# Patient Record
Sex: MALE | Race: BLACK OR AFRICAN AMERICAN | NOT HISPANIC OR LATINO | Employment: FULL TIME | URBAN - METROPOLITAN AREA
[De-identification: names, ages, dates, MRNs, and addresses within clinical notes are randomized per-mention and may not be internally consistent; named-entity substitution may affect disease eponyms.]

---

## 2017-01-30 ENCOUNTER — ALLSCRIPTS OFFICE VISIT (OUTPATIENT)
Dept: OTHER | Facility: OTHER | Age: 52
End: 2017-01-30

## 2017-03-06 ENCOUNTER — ALLSCRIPTS OFFICE VISIT (OUTPATIENT)
Dept: OTHER | Facility: OTHER | Age: 52
End: 2017-03-06

## 2017-03-06 DIAGNOSIS — M25.50 PAIN IN JOINT: ICD-10-CM

## 2017-03-06 DIAGNOSIS — M79.10 MYALGIA: ICD-10-CM

## 2017-03-06 DIAGNOSIS — E78.5 HYPERLIPIDEMIA: ICD-10-CM

## 2017-03-06 DIAGNOSIS — I10 ESSENTIAL (PRIMARY) HYPERTENSION: ICD-10-CM

## 2017-04-05 ENCOUNTER — LAB CONVERSION - ENCOUNTER (OUTPATIENT)
Dept: OTHER | Facility: OTHER | Age: 52
End: 2017-04-05

## 2017-04-05 ENCOUNTER — GENERIC CONVERSION - ENCOUNTER (OUTPATIENT)
Dept: OTHER | Facility: OTHER | Age: 52
End: 2017-04-05

## 2017-04-05 LAB
25(OH)D3 SERPL-MCNC: 16 NG/ML (ref 30–100)
A/G RATIO (HISTORICAL): 1.4 (CALC) (ref 1–2.5)
ALBUMIN SERPL BCP-MCNC: 4.2 G/DL (ref 3.6–5.1)
ALP SERPL-CCNC: 68 U/L (ref 40–115)
ALT SERPL W P-5'-P-CCNC: 23 U/L (ref 9–46)
AST SERPL W P-5'-P-CCNC: 24 U/L (ref 10–35)
BILIRUB SERPL-MCNC: 0.7 MG/DL (ref 0.2–1.2)
BUN SERPL-MCNC: 14 MG/DL (ref 7–25)
BUN/CREA RATIO (HISTORICAL): 10 (CALC) (ref 6–22)
CALCIUM SERPL-MCNC: 9.9 MG/DL (ref 8.6–10.3)
CHLORIDE SERPL-SCNC: 102 MMOL/L (ref 98–110)
CHOLEST SERPL-MCNC: 215 MG/DL (ref 125–200)
CHOLEST/HDLC SERPL: 3.5 (CALC)
CO2 SERPL-SCNC: 27 MMOL/L (ref 20–31)
CREAT SERPL-MCNC: 1.37 MG/DL (ref 0.7–1.33)
EGFR AFRICAN AMERICAN (HISTORICAL): 68 ML/MIN/1.73M2
EGFR-AMERICAN CALC (HISTORICAL): 59 ML/MIN/1.73M2
GAMMA GLOBULIN (HISTORICAL): 3 G/DL (CALC) (ref 1.9–3.7)
GLUCOSE (HISTORICAL): 99 MG/DL (ref 65–99)
HDLC SERPL-MCNC: 61 MG/DL
LDL CHOLESTEROL (HISTORICAL): 133 MG/DL (CALC)
NON-HDL-CHOL (CHOL-HDL) (HISTORICAL): 154 MG/DL (CALC)
POTASSIUM SERPL-SCNC: 4.8 MMOL/L (ref 3.5–5.3)
SODIUM SERPL-SCNC: 141 MMOL/L (ref 135–146)
T4 FREE SERPL-MCNC: 1.2 NG/DL (ref 0.8–1.8)
TOTAL PROTEIN (HISTORICAL): 7.2 G/DL (ref 6.1–8.1)
TRIGL SERPL-MCNC: 106 MG/DL
TSH SERPL DL<=0.05 MIU/L-ACNC: 1.55 MIU/L (ref 0.4–4.5)

## 2017-04-07 ENCOUNTER — ALLSCRIPTS OFFICE VISIT (OUTPATIENT)
Dept: OTHER | Facility: OTHER | Age: 52
End: 2017-04-07

## 2017-04-13 ENCOUNTER — ALLSCRIPTS OFFICE VISIT (OUTPATIENT)
Dept: OTHER | Facility: OTHER | Age: 52
End: 2017-04-13

## 2017-04-24 ENCOUNTER — HOSPITAL ENCOUNTER (EMERGENCY)
Facility: HOSPITAL | Age: 52
Discharge: HOME | End: 2017-04-24
Attending: EMERGENCY MEDICINE
Payer: COMMERCIAL

## 2017-04-24 VITALS
RESPIRATION RATE: 18 BRPM | SYSTOLIC BLOOD PRESSURE: 164 MMHG | HEIGHT: 68 IN | OXYGEN SATURATION: 95 % | TEMPERATURE: 98.4 F | BODY MASS INDEX: 40.36 KG/M2 | HEART RATE: 78 BPM | WEIGHT: 266.32 LBS | DIASTOLIC BLOOD PRESSURE: 102 MMHG

## 2017-04-24 DIAGNOSIS — K12.2 UVULITIS: Primary | ICD-10-CM

## 2017-04-24 PROCEDURE — 96372 THER/PROPH/DIAG INJ SC/IM: CPT

## 2017-04-24 PROCEDURE — 99282 EMERGENCY DEPT VISIT SF MDM: CPT

## 2017-04-24 RX ORDER — KETOROLAC TROMETHAMINE 30 MG/ML
INJECTION, SOLUTION INTRAMUSCULAR; INTRAVENOUS
Status: DISCONTINUED
Start: 2017-04-24 | End: 2017-04-24 | Stop reason: HOSPADM

## 2017-04-24 RX ORDER — PREDNISONE 10 MG/1
TABLET ORAL
Qty: 14 TABLET | Refills: 0 | Status: SHIPPED | OUTPATIENT
Start: 2017-04-24 | End: 2017-05-31 | Stop reason: ALTCHOICE

## 2017-04-24 RX ORDER — LISINOPRIL 5 MG/1
5 TABLET ORAL DAILY
COMMUNITY
End: 2017-05-31 | Stop reason: ALTCHOICE

## 2017-04-24 RX ORDER — PREDNISONE 20 MG/1
60 TABLET ORAL ONCE
Status: COMPLETED | OUTPATIENT
Start: 2017-04-24 | End: 2017-04-24

## 2017-04-24 RX ORDER — KETOROLAC TROMETHAMINE 30 MG/ML
60 INJECTION, SOLUTION INTRAMUSCULAR; INTRAVENOUS ONCE
Status: COMPLETED | OUTPATIENT
Start: 2017-04-24 | End: 2017-04-24

## 2017-04-24 RX ORDER — AMLODIPINE BESYLATE 10 MG/1
10 TABLET ORAL DAILY
COMMUNITY
End: 2018-04-13 | Stop reason: SDUPTHER

## 2017-04-24 RX ADMIN — KETOROLAC TROMETHAMINE 60 MG: 30 INJECTION, SOLUTION INTRAMUSCULAR at 06:47

## 2017-04-24 RX ADMIN — PREDNISONE 60 MG: 20 TABLET ORAL at 06:47

## 2017-04-27 ENCOUNTER — ALLSCRIPTS OFFICE VISIT (OUTPATIENT)
Dept: OTHER | Facility: OTHER | Age: 52
End: 2017-04-27

## 2017-05-31 ENCOUNTER — APPOINTMENT (EMERGENCY)
Dept: RADIOLOGY | Facility: HOSPITAL | Age: 52
End: 2017-05-31
Payer: COMMERCIAL

## 2017-05-31 ENCOUNTER — HOSPITAL ENCOUNTER (EMERGENCY)
Facility: HOSPITAL | Age: 52
Discharge: HOME | End: 2017-05-31
Attending: EMERGENCY MEDICINE
Payer: COMMERCIAL

## 2017-05-31 VITALS
WEIGHT: 274.47 LBS | DIASTOLIC BLOOD PRESSURE: 89 MMHG | TEMPERATURE: 98.5 F | RESPIRATION RATE: 18 BRPM | OXYGEN SATURATION: 93 % | BODY MASS INDEX: 41.73 KG/M2 | HEART RATE: 99 BPM | SYSTOLIC BLOOD PRESSURE: 143 MMHG

## 2017-05-31 DIAGNOSIS — R05.9 COUGH: Primary | ICD-10-CM

## 2017-05-31 PROCEDURE — 71020 HB CHEST X-RAY 2VW FRONTAL&LATL: CPT

## 2017-05-31 PROCEDURE — 94640 AIRWAY INHALATION TREATMENT: CPT

## 2017-05-31 PROCEDURE — 99283 EMERGENCY DEPT VISIT LOW MDM: CPT

## 2017-05-31 RX ORDER — SODIUM CHLORIDE FOR INHALATION 0.9 %
VIAL, NEBULIZER (ML) INHALATION
Status: COMPLETED
Start: 2017-05-31 | End: 2017-05-31

## 2017-05-31 RX ORDER — ALBUTEROL SULFATE 90 UG/1
2 AEROSOL, METERED RESPIRATORY (INHALATION) ONCE
Status: COMPLETED | OUTPATIENT
Start: 2017-05-31 | End: 2017-05-31

## 2017-05-31 RX ORDER — LOSARTAN POTASSIUM 25 MG/1
5 TABLET ORAL DAILY
COMMUNITY
End: 2018-04-29 | Stop reason: DRUGHIGH

## 2017-05-31 RX ORDER — BENZONATATE 200 MG/1
200 CAPSULE ORAL 3 TIMES DAILY PRN
Qty: 21 CAPSULE | Refills: 0 | Status: SHIPPED | OUTPATIENT
Start: 2017-05-31 | End: 2017-06-07

## 2017-05-31 RX ORDER — BENZONATATE 100 MG/1
100 CAPSULE ORAL ONCE
Status: COMPLETED | OUTPATIENT
Start: 2017-05-31 | End: 2017-05-31

## 2017-05-31 RX ORDER — ALBUTEROL SULFATE 2.5 MG/3ML
5 SOLUTION RESPIRATORY (INHALATION) ONCE
Status: COMPLETED | OUTPATIENT
Start: 2017-05-31 | End: 2017-05-31

## 2017-05-31 RX ADMIN — ALBUTEROL SULFATE 5 MG: 2.5 SOLUTION RESPIRATORY (INHALATION) at 20:35

## 2017-05-31 RX ADMIN — ISODIUM CHLORIDE 3 ML: 0.03 SOLUTION RESPIRATORY (INHALATION) at 20:35

## 2017-05-31 RX ADMIN — BENZONATATE 100 MG: 100 CAPSULE ORAL at 20:34

## 2017-05-31 RX ADMIN — ALBUTEROL SULFATE 2 PUFF: 90 AEROSOL, METERED RESPIRATORY (INHALATION) at 21:38

## 2017-08-07 DIAGNOSIS — E55.9 VITAMIN D DEFICIENCY: ICD-10-CM

## 2017-08-07 DIAGNOSIS — I10 ESSENTIAL (PRIMARY) HYPERTENSION: ICD-10-CM

## 2017-08-13 ENCOUNTER — HOSPITAL ENCOUNTER (EMERGENCY)
Facility: HOSPITAL | Age: 52
Discharge: HOME | End: 2017-08-13
Attending: EMERGENCY MEDICINE
Payer: COMMERCIAL

## 2017-08-13 ENCOUNTER — APPOINTMENT (EMERGENCY)
Dept: RADIOLOGY | Facility: HOSPITAL | Age: 52
End: 2017-08-13
Payer: COMMERCIAL

## 2017-08-13 VITALS
SYSTOLIC BLOOD PRESSURE: 176 MMHG | HEIGHT: 68 IN | RESPIRATION RATE: 18 BRPM | WEIGHT: 283.51 LBS | OXYGEN SATURATION: 97 % | HEART RATE: 81 BPM | DIASTOLIC BLOOD PRESSURE: 107 MMHG | TEMPERATURE: 98.6 F | BODY MASS INDEX: 42.97 KG/M2

## 2017-08-13 DIAGNOSIS — M79.641 RIGHT HAND PAIN: Primary | ICD-10-CM

## 2017-08-13 PROCEDURE — 99283 EMERGENCY DEPT VISIT LOW MDM: CPT

## 2017-08-13 PROCEDURE — 73130 X-RAY EXAM OF HAND: CPT

## 2017-08-13 RX ORDER — NAPROXEN 500 MG/1
500 TABLET ORAL 2 TIMES DAILY WITH MEALS
Qty: 20 TABLET | Refills: 0 | Status: SHIPPED | OUTPATIENT
Start: 2017-08-13 | End: 2017-08-27

## 2017-08-13 RX ORDER — NAPROXEN 250 MG/1
500 TABLET ORAL ONCE
Status: COMPLETED | OUTPATIENT
Start: 2017-08-13 | End: 2017-08-13

## 2017-08-13 RX ADMIN — NAPROXEN 500 MG: 250 TABLET ORAL at 21:49

## 2017-08-27 ENCOUNTER — APPOINTMENT (EMERGENCY)
Dept: RADIOLOGY | Facility: HOSPITAL | Age: 52
End: 2017-08-27
Payer: COMMERCIAL

## 2017-08-27 ENCOUNTER — HOSPITAL ENCOUNTER (EMERGENCY)
Facility: HOSPITAL | Age: 52
Discharge: HOME | End: 2017-08-27
Attending: EMERGENCY MEDICINE | Admitting: EMERGENCY MEDICINE
Payer: COMMERCIAL

## 2017-08-27 VITALS
SYSTOLIC BLOOD PRESSURE: 160 MMHG | DIASTOLIC BLOOD PRESSURE: 103 MMHG | OXYGEN SATURATION: 96 % | RESPIRATION RATE: 18 BRPM | HEART RATE: 77 BPM | TEMPERATURE: 98.2 F

## 2017-08-27 DIAGNOSIS — M75.20 BICEPS TENDINITIS: Primary | ICD-10-CM

## 2017-08-27 PROCEDURE — 99283 EMERGENCY DEPT VISIT LOW MDM: CPT

## 2017-08-27 PROCEDURE — 73030 X-RAY EXAM OF SHOULDER: CPT

## 2017-08-27 RX ORDER — IBUPROFEN 600 MG/1
600 TABLET ORAL ONCE
Status: COMPLETED | OUTPATIENT
Start: 2017-08-27 | End: 2017-08-27

## 2017-08-27 RX ORDER — IBUPROFEN 600 MG/1
600 TABLET ORAL EVERY 6 HOURS PRN
Qty: 20 TABLET | Refills: 0 | Status: SHIPPED | OUTPATIENT
Start: 2017-08-27 | End: 2018-06-13 | Stop reason: ALTCHOICE

## 2017-08-27 RX ADMIN — IBUPROFEN 600 MG: 600 TABLET ORAL at 21:34

## 2017-12-15 ENCOUNTER — APPOINTMENT (EMERGENCY)
Dept: RADIOLOGY | Facility: HOSPITAL | Age: 52
End: 2017-12-15
Payer: COMMERCIAL

## 2017-12-15 ENCOUNTER — HOSPITAL ENCOUNTER (EMERGENCY)
Facility: HOSPITAL | Age: 52
Discharge: HOME | End: 2017-12-15
Attending: EMERGENCY MEDICINE | Admitting: EMERGENCY MEDICINE
Payer: COMMERCIAL

## 2017-12-15 VITALS
DIASTOLIC BLOOD PRESSURE: 113 MMHG | TEMPERATURE: 97.6 F | RESPIRATION RATE: 20 BRPM | OXYGEN SATURATION: 96 % | HEART RATE: 78 BPM | WEIGHT: 268.3 LBS | BODY MASS INDEX: 40.79 KG/M2 | SYSTOLIC BLOOD PRESSURE: 207 MMHG

## 2017-12-15 DIAGNOSIS — M75.22 BICIPITAL TENDONITIS OF LEFT SHOULDER: Primary | ICD-10-CM

## 2017-12-15 PROCEDURE — 99283 EMERGENCY DEPT VISIT LOW MDM: CPT

## 2017-12-15 PROCEDURE — 73030 X-RAY EXAM OF SHOULDER: CPT

## 2017-12-15 RX ORDER — METHOCARBAMOL 500 MG/1
1000 TABLET, FILM COATED ORAL 2 TIMES DAILY
Qty: 30 TABLET | Refills: 0 | Status: SHIPPED | OUTPATIENT
Start: 2017-12-15 | End: 2018-06-13 | Stop reason: ALTCHOICE

## 2017-12-15 RX ORDER — PREDNISONE 20 MG/1
60 TABLET ORAL ONCE
Status: COMPLETED | OUTPATIENT
Start: 2017-12-15 | End: 2017-12-15

## 2017-12-15 RX ORDER — METHOCARBAMOL 500 MG/1
1000 TABLET, FILM COATED ORAL ONCE
Status: COMPLETED | OUTPATIENT
Start: 2017-12-15 | End: 2017-12-15

## 2017-12-15 RX ORDER — PREDNISONE 20 MG/1
60 TABLET ORAL DAILY
Qty: 15 TABLET | Refills: 0 | Status: SHIPPED | OUTPATIENT
Start: 2017-12-15 | End: 2017-12-20

## 2017-12-15 RX ADMIN — PREDNISONE 60 MG: 20 TABLET ORAL at 21:42

## 2017-12-15 RX ADMIN — METHOCARBAMOL 1000 MG: 500 TABLET ORAL at 21:42

## 2017-12-16 NOTE — DISCHARGE INSTRUCTIONS
Tendinitis   WHAT YOU NEED TO KNOW:   Tendinitis is painful inflammation or breakdown of your tendons  It may also be called tendinopathy  Tendinitis often occurs in the knee, shoulder, ankle, hip, or elbow  DISCHARGE INSTRUCTIONS:   Medicines:   · Pain medicines  such as acetaminophen or NSAIDs may decrease swelling and pain or fever  These medicines are available without a doctor's order  Ask which medicine to take  Ask how much to take and when to take it  Follow directions  Acetaminophen and NSAIDs can cause liver or kidney damage if not taken correctly  If you take blood thinner medicine, always ask your healthcare provider if NSAIDs are safe for you  Always read the medicine label and follow the directions on it before using these medicine  · Take your medicine as directed  Contact your healthcare provider if you think your medicine is not helping or if you have side effects  Tell him if you are allergic to any medicine  Keep a list of the medicines, vitamins, and herbs you take  Include the amounts, and when and why you take them  Bring the list or the pill bottles to follow-up visits  Carry your medicine list with you in case of an emergency  Management:   · Rest  your tendon as directed to help it heal  Ask your healthcare provider if you need to stop putting weight on your affected area  · Ice  helps decrease swelling and pain  Ice may also help prevent tissue damage  Use an ice pack, or put crushed ice in a plastic bag  Cover it with a towel and place it on the affected area for 10 to 15 minutes every hour or as directed  · Support devices  such as a cane, splint, shoe insert, or brace may help reduce your pain  · Physical therapy  may be ordered by your healthcare provider  This may be used to teach you exercises to help improve movement and strength, and to decrease pain  You may also learn how to improve your posture, and how to lift or exercise correctly    Prevention:   · Stretch and warm up  before you exercise  · Exercise regularly  to strengthen the muscles around your joint  Ease into an exercise routine for the first 3 weeks to prevent another injury  Ask your healthcare provider about the best exercise plan for you  Rest fully between activities  · Use the right equipment  for sports and exercise  Wear braces or tape around weak joints as directed  Follow up with your healthcare provider as directed:  Write down your questions so you remember to ask them during your visits  Contact your healthcare provider if:   · You have increased pain even after you take medicine  · You have questions or concerns about your condition or care  Return to the emergency department if:   · You have increased redness over the joint, or swelling in the joint  · You suddenly cannot move your joint  © 2017 [address] [employer]  Information is for End User's use only and may not be sold, redistributed or otherwise used for commercial purposes  All illustrations and images included in CareNotes® are the copyrighted property of A D A M , Inc  or Nixon Castro  The above information is an  only  It is not intended as medical advice for individual conditions or treatments  Talk to your doctor, nurse or pharmacist before following any medical regimen to see if it is safe and effective for you

## 2017-12-16 NOTE — ED PROVIDER NOTES
History  Chief Complaint   Patient presents with    Shoulder Pain     Pt presents to ER with c/o's (L) shoulder pain that last night, pt states he can hardly move his arm  Pt denies trauma  Healthy appearing adult presents in no distress  71-year-old male patient presents to the emergency department for evaluation approximately 24 hours of left shoulder pain  The patient does have a history of bicipital tendinitis  The patient has similar symptoms again  The patient has point tenderness over the bicipital tendon  The patient on x-ray done to assure that there is no acute fracture will be treated for tendinitis  History provided by:  Patient   used: No    Shoulder Pain   Location:  Shoulder  Shoulder location:  L shoulder  Injury: no    Pain details:     Quality:  Sharp    Radiates to:  Does not radiate    Severity:  Mild    Onset quality:  Gradual    Timing:  Constant    Progression:  Unchanged  Handedness:  Right-handed  Dislocation: no    Foreign body present:  No foreign bodies  Tetanus status:  Up to date  Prior injury to area:  Yes  Relieved by:  Nothing  Worsened by:  Nothing  Ineffective treatments:  None tried  Associated symptoms: no fever, no muscle weakness, no neck pain, no numbness and no stiffness    Risk factors: no concern for non-accidental trauma        Prior to Admission Medications   Prescriptions Last Dose Informant Patient Reported? Taking? amLODIPine (NORVASC) 10 mg tablet   Yes No   Sig: Take 10 mg by mouth daily   ibuprofen (MOTRIN) 600 mg tablet   No No   Sig: Take 1 tablet by mouth every 6 (six) hours as needed for mild pain for up to 20 doses   losartan (COZAAR) 25 mg tablet   Yes No   Sig: Take 5 mg by mouth daily      Facility-Administered Medications: None       Past Medical History:   Diagnosis Date    Hypertension        History reviewed  No pertinent surgical history  History reviewed  No pertinent family history    I have reviewed and agree with the history as documented  Social History   Substance Use Topics    Smoking status: Never Smoker    Smokeless tobacco: Not on file    Alcohol use Yes        Review of Systems   Constitutional: Negative for fever  Musculoskeletal: Negative for neck pain and stiffness  All other systems reviewed and are negative  Physical Exam  ED Triage Vitals [12/15/17 1959]   Temperature Pulse Respirations Blood Pressure SpO2   97 6 °F (36 4 °C) 78 20 (!) 207/113 96 %      Temp Source Heart Rate Source Patient Position - Orthostatic VS BP Location FiO2 (%)   Oral Monitor Lying Right arm --      Pain Score       --           Orthostatic Vital Signs  Vitals:    12/15/17 1959   BP: (!) 207/113   Pulse: 78   Patient Position - Orthostatic VS: Lying       Physical Exam   Constitutional: He is oriented to person, place, and time  He appears well-developed and well-nourished  No distress  HENT:   Head: Normocephalic and atraumatic  Right Ear: External ear normal    Left Ear: External ear normal    Eyes: Conjunctivae and EOM are normal  Right eye exhibits no discharge  Left eye exhibits no discharge  No scleral icterus  Neck: Normal range of motion  Neck supple  No JVD present  No tracheal deviation present  No thyromegaly present  Cardiovascular: Normal rate and regular rhythm  Pulmonary/Chest: Effort normal and breath sounds normal  No stridor  No respiratory distress  He has no wheezes  He has no rales  Abdominal: Soft  Bowel sounds are normal  He exhibits no distension  There is no tenderness  Musculoskeletal: Normal range of motion  He exhibits no edema, tenderness or deformity  Arms:  Neurological: He is alert and oriented to person, place, and time  No cranial nerve deficit  Coordination normal    Skin: Skin is warm and dry  He is not diaphoretic  Psychiatric: He has a normal mood and affect  His behavior is normal    Nursing note and vitals reviewed        ED Medications  Medications predniSONE tablet 60 mg (60 mg Oral Given 12/15/17 2142)   methocarbamol (ROBAXIN) tablet 1,000 mg (1,000 mg Oral Given 12/15/17 2142)       Diagnostic Studies  Results Reviewed     None                 XR shoulder 2+ views LEFT   Final Result by Hasmukh Crawford MD (12/15 2035)      No evidence of acute left shoulder fracture, osseous lesion or soft tissue mass  Workstation performed: [id number]                    Procedures  Procedures       Phone Contacts  ED Phone Contact    ED Course  ED Course                                MDM  Number of Diagnoses or Management Options  Bicipital tendonitis of left shoulder: new and requires workup     Amount and/or Complexity of Data Reviewed  Tests in the radiology section of CPT®: ordered and reviewed  Decide to obtain previous medical records or to obtain history from someone other than the patient: yes  Review and summarize past medical records: yes    Patient Progress  Patient progress: stable    CritCare Time    Disposition  Final diagnoses:   Bicipital tendonitis of left shoulder     Time reflects when diagnosis was documented in both MDM as applicable and the Disposition within this note     Time User Action Codes Description Comment    12/15/2017  9:35 PM Mauricio Rankin Add [M75 22] Bicipital tendonitis of left shoulder       ED Disposition     ED Disposition Condition Comment    Discharge  Vandana Villarreal discharge to home/self care      Condition at discharge: Good        Follow-up Information     Follow up With Specialties Details Why Contact Info    Namita Saha MD Internal Medicine   [address] [address]  [phone]          Discharge Medication List as of 12/15/2017  9:36 PM      START taking these medications    Details   methocarbamol (ROBAXIN) 500 mg tablet Take 2 tablets by mouth 2 (two) times a day, Starting Fri 12/15/2017, Print      predniSONE 20 mg tablet Take 3 tablets by mouth daily for 5 days, Starting Fri 12/15/2017, Until Wed 12/20/2017, Print         CONTINUE these medications which have NOT CHANGED    Details   amLODIPine (NORVASC) 10 mg tablet Take 10 mg by mouth daily, Until Discontinued, Historical Med      ibuprofen (MOTRIN) 600 mg tablet Take 1 tablet by mouth every 6 (six) hours as needed for mild pain for up to 20 doses, Starting Sun 8/27/2017, Print      losartan (COZAAR) 25 mg tablet Take 5 mg by mouth daily, Until Discontinued, Historical Med           No discharge procedures on file      ED Provider  Electronically Signed by           Rios Soto DO  12/17/17 1521

## 2017-12-20 ENCOUNTER — GENERIC CONVERSION - ENCOUNTER (OUTPATIENT)
Dept: OTHER | Facility: OTHER | Age: 52
End: 2017-12-20

## 2017-12-20 ENCOUNTER — ALLSCRIPTS OFFICE VISIT (OUTPATIENT)
Dept: OTHER | Facility: OTHER | Age: 52
End: 2017-12-20

## 2017-12-20 DIAGNOSIS — I10 ESSENTIAL (PRIMARY) HYPERTENSION: ICD-10-CM

## 2017-12-20 DIAGNOSIS — Z12.5 ENCOUNTER FOR SCREENING FOR MALIGNANT NEOPLASM OF PROSTATE: ICD-10-CM

## 2017-12-20 DIAGNOSIS — R73.9 HYPERGLYCEMIA: ICD-10-CM

## 2017-12-20 DIAGNOSIS — E55.9 VITAMIN D DEFICIENCY: ICD-10-CM

## 2017-12-30 ENCOUNTER — LAB CONVERSION - ENCOUNTER (OUTPATIENT)
Dept: OTHER | Facility: OTHER | Age: 52
End: 2017-12-30

## 2017-12-30 LAB
25(OH)D3 SERPL-MCNC: 27 NG/ML (ref 30–100)
A/G RATIO (HISTORICAL): 1.4 (CALC) (ref 1–2.5)
ALBUMIN SERPL BCP-MCNC: 4 G/DL (ref 3.6–5.1)
ALP SERPL-CCNC: 68 U/L (ref 40–115)
ALT SERPL W P-5'-P-CCNC: 14 U/L (ref 9–46)
AST SERPL W P-5'-P-CCNC: 20 U/L (ref 10–35)
BASOPHILS # BLD AUTO: 0.8 %
BASOPHILS # BLD AUTO: 47 CELLS/UL (ref 0–200)
BILIRUB SERPL-MCNC: 0.8 MG/DL (ref 0.2–1.2)
BUN SERPL-MCNC: 10 MG/DL (ref 7–25)
BUN/CREA RATIO (HISTORICAL): ABNORMAL (CALC) (ref 6–22)
CALCIUM SERPL-MCNC: 9.1 MG/DL (ref 8.6–10.3)
CHLORIDE SERPL-SCNC: 100 MMOL/L (ref 98–110)
CHOLEST SERPL-MCNC: 242 MG/DL
CHOLEST/HDLC SERPL: 3.4 (CALC)
CO2 SERPL-SCNC: 31 MMOL/L (ref 20–31)
CREAT SERPL-MCNC: 1 MG/DL (ref 0.7–1.33)
DEPRECATED RDW RBC AUTO: 13.1 % (ref 11–15)
EGFR AFRICAN AMERICAN (HISTORICAL): 100 ML/MIN/1.73M2
EGFR-AMERICAN CALC (HISTORICAL): 86 ML/MIN/1.73M2
EOSINOPHIL # BLD AUTO: 201 CELLS/UL (ref 15–500)
EOSINOPHIL # BLD AUTO: 3.4 %
GAMMA GLOBULIN (HISTORICAL): 2.9 G/DL (CALC) (ref 1.9–3.7)
GLUCOSE (HISTORICAL): 122 MG/DL (ref 65–99)
HCT VFR BLD AUTO: 43.9 % (ref 38.5–50)
HDLC SERPL-MCNC: 72 MG/DL
HGB BLD-MCNC: 15 G/DL (ref 13.2–17.1)
LDL CHOLESTEROL (HISTORICAL): 130 MG/DL (CALC)
LYMPHOCYTES # BLD AUTO: 12.8 %
LYMPHOCYTES # BLD AUTO: 755 CELLS/UL (ref 850–3900)
MCH RBC QN AUTO: 30.7 PG (ref 27–33)
MCHC RBC AUTO-ENTMCNC: 34.2 G/DL (ref 32–36)
MCV RBC AUTO: 90 FL (ref 80–100)
MONOCYTES # BLD AUTO: 620 CELLS/UL (ref 200–950)
MONOCYTES (HISTORICAL): 10.5 %
NEUTROPHILS # BLD AUTO: 4278 CELLS/UL (ref 1500–7800)
NEUTROPHILS # BLD AUTO: 72.5 %
NON-HDL-CHOL (CHOL-HDL) (HISTORICAL): 170 MG/DL (CALC)
PLATELET # BLD AUTO: 324 THOUSAND/UL (ref 140–400)
PMV BLD AUTO: 9.8 FL (ref 7.5–12.5)
POTASSIUM SERPL-SCNC: 4.3 MMOL/L (ref 3.5–5.3)
PROSTATE SPECIFIC ANTIGEN TOTAL (HISTORICAL): 1 NG/ML
RBC # BLD AUTO: 4.88 MILLION/UL (ref 4.2–5.8)
SODIUM SERPL-SCNC: 139 MMOL/L (ref 135–146)
TOTAL PROTEIN (HISTORICAL): 6.9 G/DL (ref 6.1–8.1)
TRIGL SERPL-MCNC: 263 MG/DL
WBC # BLD AUTO: 5.9 THOUSAND/UL (ref 3.8–10.8)

## 2018-01-02 ENCOUNTER — GENERIC CONVERSION - ENCOUNTER (OUTPATIENT)
Dept: OTHER | Facility: OTHER | Age: 53
End: 2018-01-02

## 2018-01-11 NOTE — RESULT NOTES
Verified Results  NM MYOCARDIAL PERFUSION SPECT (RX STRESS AND/OR REST) [id number] 07:39AM Renaye Marker     Test Name Result Flag Reference   NM MYOCARDIAL PERFUSION SPECT (RX STRESS AND/OR REST) (Report)     [address]   [phone]     Rest/Stress Gated SPECT Myocardial Perfusion Imaging After Regadenoson     Patient: Corinne Berrios   MR number: [id number]   Account number: [de-identified]   : 1965   Age: 46 years   Gender: Male   Status: Outpatient   Location: [facility]   Height: 68 in   Weight: 251 lb   BP: 126/ 84 mmHg     Allergies: NO KNOWN ALLERGIES     Diagnosis: I51 7 - Cardiomegaly, R06 09 - Other forms of dyspnea     Primary Physician: Artemio Vieira MD   Referring Physician: Shasta Fees Tora Dakin, PA-C   Technician: Sabiha Lozano BS, BA, AART(N)   Group: [facility]   Other: Alisson Ramesh MS, [place]   Interpreting Physician: Erin De La Vega MD     INDICATIONS: Detection of coronary artery disease  HISTORY: The patient is a 46year old  male  Chest pain status:   no chest pain  Other symptoms: dyspnea  Coronary artery disease risk factors:   hypertension  Cardiovascular history: none significant  Co-morbidity: obesity  Medications: a calcium channel blocker  PHYSICAL EXAM: Baseline physical exam screening: normal      REST ECG: Normal baseline ECG except for non-specific ST changes  PROCEDURE: The study was performed at [address] [place] [address]  A   regadenoson infusion pharmacologic stress test was performed  Gated SPECT   myocardial perfusion imaging was performed after stress and at rest  Systolic   blood pressure was 126 mmHg, at the start of the study  Diastolic blood   pressure was 84 mmHg, at the start of the study  The heart rate was 67 bpm, at   the start of the study   Patient was not experiencing chest pain at the time of   the injection of the radiopharmaceutical    Regadenoson protocol:   HR bpm SBP mmHg DBP mmHg Symptoms ST change Rhythm/conduct   Baseline 67 126 84 none nonspecific ST abnormality NSR, no ectopy   Immediate 87 -- -- mild dyspnea same as above NSR, no ectopy   1 min 86 106 78 subsiding same as above NSR, no ectopy   2 min 80 104 76 none same as above NSR, no ectopy   3 min 77 -- -- none same as above NSR, rare PAC's   4 min 75 124 84 none same as above NSR, no ectopy   No medications or fluids given  STRESS SUMMARY: Duration of pharmacologic stress was 1 min  Maximal heart rate   during stress was 87 bpm  The heart rate response to stress was normal  There   was normal resting blood pressure with an appropriate response to stress  The   rate-pressure product for the peak heart rate and blood pressure was 9222  There was no chest pain during stress  The stress test was terminated due to   protocol completion  The stress ECG was negative for ischemia  There were no   stress arrhythmias or conduction abnormalities  ISOTOPE ADMINISTRATION:   Resting isotope administration Stress isotope administration   Agent Sestamibi Sestamibi   Dose 10 85 mCi 32 8 mCi   Date 04/29/2016 04/29/2016     The radiopharmaceutical was injected at the peak effect of pharmacologic   stress  MYOCARDIAL PERFUSION IMAGING:   The image quality was good  Rotating projection images reveal mild   subdiaphragmatic activity  Left ventricular size was normal      PERFUSION DEFECTS:   - There were no perfusion defects  A small fixed perfusion defect in apical inferior segment and the apex of LV   was seen to be shannon well on the gated SPECT images indicating that it   was likely an artifact  GATED SPECT:   The calculated left ventricular ejection fraction was 49 %  Left ventricular   ejection fraction was mildly decreased by visual estimate  There was no   diagnostic evidence for left ventricular regional abnormality       SUMMARY:   - Stress results: There was no chest pain during stress  - ECG conclusions: The stress ECG was negative for ischemia  There were no   stress arrhythmias or conduction abnormalities  - Perfusion imaging: There were no perfusion defects  A small fixed perfusion defect in apical inferior segment and the apex of LV   was seen to be shannon well on the gated SPECT images indicating that it   was likely an artifact  - Gated SPECT: The calculated left ventricular   ejection fraction was 49 %  Left ventricular ejection fraction was mildly   decreased by visual estimate  There was no diagnostic evidence for left   ventricular regional abnormality  IMPRESSIONS: Myocardial perfusion imaging was normal at rest and with stress  Left ventricular systolic function was reduced, without distinct regional wall   motion abnormalities       Prepared and signed by     Tanja Fry MD   Signed 04/29/2016 17:04:15

## 2018-01-11 NOTE — MISCELLANEOUS
Provider Comments  Provider Comments:   pt was a no show for apt on 6/17/16 at 8:00 with dr Gray Li        Signatures   Electronically signed by : Macrina Vieyra MD; Jun 17 2016 12:26PM EST

## 2018-01-11 NOTE — MISCELLANEOUS
Message  Return to work or school:   Wally Grullon is under my professional care  He was seen in my office on 4/13/17   He is able to return to work on  4/17/17    He can perform work without limitations  Aneesh Weston is released to return to work, full duties, as of 4/17/17  Wyatt Cushing, PA-C  Signatures   Electronically signed by : Mackenzie Doshi, [facility];  Apr 13 2017  2:09PM EST                       (Author)

## 2018-01-13 VITALS
HEART RATE: 88 BPM | TEMPERATURE: 98.4 F | SYSTOLIC BLOOD PRESSURE: 160 MMHG | OXYGEN SATURATION: 96 % | BODY MASS INDEX: 41.45 KG/M2 | WEIGHT: 273.5 LBS | HEIGHT: 68 IN | DIASTOLIC BLOOD PRESSURE: 100 MMHG

## 2018-01-13 VITALS
SYSTOLIC BLOOD PRESSURE: 140 MMHG | OXYGEN SATURATION: 98 % | HEIGHT: 68 IN | WEIGHT: 272.5 LBS | HEART RATE: 90 BPM | BODY MASS INDEX: 41.3 KG/M2 | DIASTOLIC BLOOD PRESSURE: 80 MMHG

## 2018-01-13 VITALS
BODY MASS INDEX: 41.45 KG/M2 | HEIGHT: 68 IN | SYSTOLIC BLOOD PRESSURE: 140 MMHG | DIASTOLIC BLOOD PRESSURE: 90 MMHG | HEART RATE: 80 BPM | WEIGHT: 273.5 LBS | OXYGEN SATURATION: 98 %

## 2018-01-13 NOTE — RESULT NOTES
Verified Results  (1) LIPID PANEL, FASTING [id number] 08:12AM [facility]     Test Name Result Flag Reference   CHOLESTEROL, TOTAL 185 mg/dL  125-200   HDL CHOLESTEROL 45 mg/dL  > OR = 40   TRIGLICERIDES 517 mg/dL  <150   LDL-CHOLESTEROL 118 mg/dL (calc)  <130   Desirable range <100 mg/dL for patients with CHD or  diabetes and <70 mg/dL for diabetic patients with  known heart disease  CHOL/HDLC RATIO 4 1 (calc)  < OR = 5 0   NON HDL CHOLESTEROL 140 mg/dL (calc)     Target for non-HDL cholesterol is 30 mg/dL higher than   LDL cholesterol target  (1) COMPREHENSIVE METABOLIC PANEL [id number] 59:03QD [facility]     Test Name Result Flag Reference   GLUCOSE 106 mg/dL H 65-99   Fasting reference interval   UREA NITROGEN (BUN) 11 mg/dL  7-25   CREATININE 1 12 mg/dL  0 70-1 33   For patients >52years of age, the reference limit  for Creatinine is approximately 13% higher for people  identified as -American  eGFR NON-AFR   AMERICAN 76 mL/min/1 73m2  > OR = 60   eGFR AFRICAN AMERICAN 88 mL/min/1 73m2  > OR = 60   BUN/CREATININE RATIO   7-72   NOT APPLICABLE (calc)   SODIUM 139 mmol/L  135-146   POTASSIUM 4 0 mmol/L  3 5-5 3   CHLORIDE 99 mmol/L     CARBON DIOXIDE 30 mmol/L  19-30   CALCIUM 9 6 mg/dL  8 6-10 3   PROTEIN, TOTAL 7 1 g/dL  6 1-8 1   ALBUMIN 3 9 g/dL  3 6-5 1   GLOBULIN 3 2 g/dL (calc)  1 9-3 7   ALBUMIN/GLOBULIN RATIO 1 2 (calc)  1 0-2 5   BILIRUBIN, TOTAL 0 9 mg/dL  0 2-1 2   ALKALINE PHOSPHATASE 60 U/L     AST 20 U/L  10-35   ALT 24 U/L  9-46     (1) CBC/PLT/DIFF [id number] 08:12AM [facility]     Test Name Result Flag Reference   WHITE BLOOD CELL COUNT 4 3 Thousand/uL  3 8-10 8   RED BLOOD CELL COUNT 5 17 Million/uL  4 20-5 80   HEMOGLOBIN 15 0 g/dL  13 2-17 1   HEMATOCRIT 46 0 %  38 5-50 0   MCV 89 1 fL  80 0-100 0   MCH 29 0 pg  27 0-33 0   MCHC 32 6 g/dL  32 0-36 0   RDW 13 2 %  11 0-15 0   PLATELET COUNT 939 Thousand/uL  140-400   MPV 8 2 fL  7 5-11 5   ABSOLUTE NEUTROPHILS 2623 cells/uL  0593-4232   ABSOLUTE LYMPHOCYTES 1058 cells/uL  850-3900   ABSOLUTE MONOCYTES 383 cells/uL  200-950   ABSOLUTE EOSINOPHILS 215 cells/uL     ABSOLUTE BASOPHILS 22 cells/uL  0-200   NEUTROPHILS 61 0 %     LYMPHOCYTES 24 6 %     MONOCYTES 8 9 %     EOSINOPHILS 5 0 %     BASOPHILS 0 5 %       (Q) TSH, 3RD GENERATION [id number] 08:12AM [place]   REPORT COMMENT:  FASTING:YES     Test Name Result Flag Reference   TSH 0 87 mIU/L  0 40-4 50

## 2018-01-14 VITALS
BODY MASS INDEX: 41.37 KG/M2 | SYSTOLIC BLOOD PRESSURE: 130 MMHG | HEIGHT: 68 IN | OXYGEN SATURATION: 98 % | DIASTOLIC BLOOD PRESSURE: 82 MMHG | WEIGHT: 273 LBS | HEART RATE: 84 BPM

## 2018-01-15 VITALS
SYSTOLIC BLOOD PRESSURE: 126 MMHG | HEIGHT: 68 IN | WEIGHT: 270 LBS | TEMPERATURE: 99.4 F | BODY MASS INDEX: 40.92 KG/M2 | DIASTOLIC BLOOD PRESSURE: 90 MMHG | OXYGEN SATURATION: 98 % | HEART RATE: 88 BPM

## 2018-01-16 NOTE — RESULT NOTES
Verified Results  (1) COMPREHENSIVE METABOLIC PANEL [id number] 72:75JC [facility]     Test Name Result Flag Reference   GLUCOSE 99 mg/dL  65-99   Fasting reference interval   UREA NITROGEN (BUN) 14 mg/dL  7-25   CREATININE 1 37 mg/dL H 0 70-1 33   For patients >52years of age, the reference limit  for Creatinine is approximately 13% higher for people  identified as -American  eGFR NON-AFR  AMERICAN 59 mL/min/1 73m2 L > OR = 60   eGFR AFRICAN AMERICAN 68 mL/min/1 73m2  > OR = 60   BUN/CREATININE RATIO 10 (calc)  6-22   SODIUM 141 mmol/L  135-146   POTASSIUM 4 8 mmol/L  3 5-5 3   CHLORIDE 102 mmol/L     CARBON DIOXIDE 27 mmol/L  20-31   CALCIUM 9 9 mg/dL  8 6-10 3   PROTEIN, TOTAL 7 2 g/dL  6 1-8 1   ALBUMIN 4 2 g/dL  3 6-5 1   GLOBULIN 3 0 g/dL (calc)  1 9-3 7   ALBUMIN/GLOBULIN RATIO 1 4 (calc)  1 0-2 5   BILIRUBIN, TOTAL 0 7 mg/dL  0 2-1 2   ALKALINE PHOSPHATASE 68 U/L     AST 24 U/L  10-35   ALT 23 U/L  9-46     (1) LIPID PANEL, FASTING 04Apr2017 09:52AM [facility]     Test Name Result Flag Reference   CHOLESTEROL, TOTAL 215 mg/dL H 125-200   HDL CHOLESTEROL 61 mg/dL  > OR = 40   TRIGLICERIDES 526 mg/dL  <150   LDL-CHOLESTEROL 133 mg/dL (calc) H <130   Desirable range <100 mg/dL for patients with CHD or  diabetes and <70 mg/dL for diabetic patients with  known heart disease  CHOL/HDLC RATIO 3 5 (calc)  < OR = 5 0   NON HDL CHOLESTEROL 154 mg/dL (calc)     Target for non-HDL cholesterol is 30 mg/dL higher than   LDL cholesterol target       (1) T4, FREE 04Apr2017 09:52AM [facility]     Test Name Result Flag Reference   T4, FREE 1 2 ng/dL  0 8-1 8     (Q) TSH, 3RD GENERATION 04Apr2017 09:52AM [facility]     Test Name Result Flag Reference   TSH 1 55 mIU/L  0 40-4 50     *(Q) VITAMIN D, 25-HYDROXY, LC/MS/MS 04Apr2017 09:52AM [facility]   REPORT COMMENT:  FASTING:YES     Test Name Result Flag Reference   VITAMIN D, 25-OH, TOTAL 16 ng/mL L    Vitamin D Status         25-OH Vitamin D:     Deficiency:                    <20 ng/mL  Insufficiency:             20 - 29 ng/mL  Optimal:                 > or = 30 ng/mL     For 25-OH Vitamin D testing on patients on   D2-supplementation and patients for whom quantitation   of D2 and D3 fractions is required, the QuestAssureD(TM)  25-OH VIT D, (D2,D3), LC/MS/MS is recommended: order   code 41371 (patients >2yrs)  For more information on this test, go to:  http://[employer]/faq/TEG018  (This link is being provided for   informational/educational purposes only )

## 2018-01-17 NOTE — PROCEDURES
Chief Complaint  Patient seen in office today for Ear Flush B/L  Current Meds   1  AmLODIPine Besylate 10 MG Oral Tablet; Take 1 tablet daily; Therapy: [id number] to (Evaluate:21Jhg4710)  Requested for: 08BGP4081; Last   Rx:[id number] Ordered   2  Escitalopram Oxalate 10 MG Oral Tablet; take one tablet by mouth one time daily; Therapy: 78KSR1456 to (Evaluate:26Txz4568)  Requested for: 14XCS9991; Last   Rx:06Mar2017 Ordered   3  Lisinopril 5 MG Oral Tablet; TAKE 1 TABLET DAILY; Therapy: 40Nar6668 to (Mayme Peer)  Requested for: 07Apr2017; Last   Rx:07Apr2017 Ordered   4  Vitamin D (Ergocalciferol) 38779 UNIT Oral Capsule; take 1 capsule weekly; Therapy: 29PKE5510 to (Evaluate:68Nls9349)  Requested for: 07Apr2017; Last   Rx:[id number] Ordered    Allergies    1  No Known Drug Allergies    Vitals  Signs    Heart Rate: 84  Systolic: 482  Diastolic: 82  Height: 5 ft 8 in  Weight: 273 lb   BMI Calculated: 41 51  BSA Calculated: 2 34  O2 Saturation: 98    Procedure    Procedure: cerumen removal    Indication: tympanic membrane(s) could not be visualized in both ears  Prep: Cerumenex was placed in the canal prior to the procedure  Procedure Note: The procedure was performed by the Provider  A otoscope was placed in the ear canal(s) to visualize the ear canal debris  The ear was cleaned by using warm water irrigation and a wire loop  The procedure was successful  Post-Procedure:   Patient Status: the patient tolerated the procedure well  Complications: there were no complications  Follow-up as needed  Assessment    1  Major depressive disorder, single episode, moderate (296 22) (F32 1)   2  Benign hypertension (401 1) (I10)   3   Bilateral impacted cerumen (380 4) (H61 23)    Plan  Bilateral impacted cerumen    · Removal Impacted Cerumen Using Irrigation / Lavage one or both ears - POC;  Status:Complete;   Done: 47FFO2670  Erectile dysfunction    · Cialis 20 MG Oral Tablet; TAKE 1 TABLET 1 HOUR BEFORE ACTIVITY AS  NEEDED    Discussion/Summary    Discussed case with Pts Psychologist  We both agree Pt has responded much better to the Escitalopram and counseling  Pt is ready to return to work full duties Monday April 17th, 2017  Follow up as scheduled, sooner as needed  Future Appointments    Date/Time Provider Specialty Site   08/11/2017 08:00 AM Derick Wright, [facility] Internal Medicine [address] INTERNAL MED     Signatures   Electronically signed by : Lisa Contreras [facility];  Apr 13 2017  2:05PM EST                       (Author)    Electronically signed by : Svitlana Cummins MD; Apr 13 2017  2:28PM EST

## 2018-01-23 NOTE — RESULT NOTES
Verified Results  (1) CBC/PLT/DIFF [id number] 09:39AM Gee Frye     Test Name Result Flag Reference   WHITE BLOOD CELL COUNT 5 9 Thousand/uL  3 8-10 8   RED BLOOD CELL COUNT 4 88 Million/uL  4 20-5 80   HEMOGLOBIN 15 0 g/dL  13 2-17 1   HEMATOCRIT 43 9 %  38 5-50 0   MCV 90 0 fL  80 0-100 0   MCH 30 7 pg  27 0-33 0   MCHC 34 2 g/dL  32 0-36 0   RDW 13 1 %  11 0-15 0   PLATELET COUNT 358 Thousand/uL  140-400   ABSOLUTE NEUTROPHILS 4278 cells/uL  2629-2273   ABSOLUTE LYMPHOCYTES 755 cells/uL L 850-3900   ABSOLUTE MONOCYTES 620 cells/uL  200-950   ABSOLUTE EOSINOPHILS 201 cells/uL     ABSOLUTE BASOPHILS 47 cells/uL  0-200   NEUTROPHILS 72 5 %     LYMPHOCYTES 12 8 %     MONOCYTES 10 5 %     EOSINOPHILS 3 4 %     BASOPHILS 0 8 %     MPV 9 8 fL  7 5-12 5     (1) COMPREHENSIVE METABOLIC PANEL [id number] 02:61LQ Gee Frye     Test Name Result Flag Reference   GLUCOSE 122 mg/dL H 65-99   Fasting reference interval     For someone without known diabetes, a glucose value  between 100 and 125 mg/dL is consistent with  prediabetes and should be confirmed with a  follow-up test    UREA NITROGEN (BUN) 10 mg/dL  7-25   CREATININE 1 00 mg/dL  0 70-1 33   For patients >52years of age, the reference limit  for Creatinine is approximately 13% higher for people  identified as -American  eGFR NON-AFR   AMERICAN 86 mL/min/1 73m2  > OR = 60   eGFR AFRICAN AMERICAN 100 mL/min/1 73m2  > OR = 60   BUN/CREATININE RATIO   7-20   NOT APPLICABLE (calc)   SODIUM 139 mmol/L  135-146   POTASSIUM 4 3 mmol/L  3 5-5 3   CHLORIDE 100 mmol/L     CARBON DIOXIDE 31 mmol/L  20-31   CALCIUM 9 1 mg/dL  8 6-10 3   PROTEIN, TOTAL 6 9 g/dL  6 1-8 1   ALBUMIN 4 0 g/dL  3 6-5 1   GLOBULIN 2 9 g/dL (calc)  1 9-3 7   ALBUMIN/GLOBULIN RATIO 1 4 (calc)  1 0-2 5   BILIRUBIN, TOTAL 0 8 mg/dL  0 2-1 2   ALKALINE PHOSPHATASE 68 U/L     AST 20 U/L  10-35   ALT 14 U/L  9-46     (1) LIPID PANEL, FASTING [id number] 09:39AM Gee Maciel Name Result Flag Reference   CHOLESTEROL, TOTAL 242 mg/dL H <200   HDL CHOLESTEROL 72 mg/dL  >62   TRIGLICERIDES 387 mg/dL H <150   LDL-CHOLESTEROL 130 mg/dL (calc) H    Reference range: <100     Desirable range <100 mg/dL for patients with CHD or  diabetes and <70 mg/dL for diabetic patients with  known heart disease  LDL-C is now calculated using the Farrukh-Holden   calculation, which is a validated novel method providing   better accuracy than the Friedewald equation in the   estimation of LDL-C  Kim Mitchell  Sydnie Wang  8772;485(22): 5421-8446   (http://[employer]faq/VVI304)   CHOL/HDLC RATIO 3 4 (calc)  <5 0   NON HDL CHOLESTEROL 170 mg/dL (calc) H <130   For patients with diabetes plus 1 major ASCVD risk   factor, treating to a non-HDL-C goal of <100 mg/dL   (LDL-C of <70 mg/dL) is considered a therapeutic   option  (1) PSA (SCREEN) (Dx V76 44 Screen for Prostate Cancer) [id number] 09:39AM Buck Mcmahon     Test Name Result Flag Reference   PSA, TOTAL 1 0 ng/mL  < OR = 4 0   The total PSA value from this assay system is   standardized against the WHO standard  The test   result will be approximately 20% lower when compared   to the equimolar-standardized total PSA (Toñito   Constantin)  Comparison of serial PSA results should be   interpreted with this fact in mind  This test was performed using the Siemens   chemiluminescent method  Values obtained from   different assay methods cannot be used  interchangeably  PSA levels, regardless of  value, should not be interpreted as absolute  evidence of the presence or absence of disease       *(Q) VITAMIN D, 25-HYDROXY, LC/MS/MS [id number] 09:39AM Buck Mcmahon   REPORT COMMENT:  FASTING:YES     Test Name Result Flag Reference   VITAMIN D, 25-OH, TOTAL 27 ng/mL L    Vitamin D Status         25-OH Vitamin D:     Deficiency:                    <20 ng/mL  Insufficiency:             20 - 29 ng/mL  Optimal:                 > or = 30 ng/mL     For 25-OH Vitamin D testing on patients on   D2-supplementation and patients for whom quantitation   of D2 and D3 fractions is required, the QuestAssureD(TM)  25-OH VIT D, (D2,D3), LC/MS/MS is recommended: order   code 41796 (patients >2yrs)  For more information on this test, go to:  http://[employer]/faq/YOS846  (This link is being provided for   informational/educational purposes only )

## 2018-01-24 VITALS
HEART RATE: 90 BPM | DIASTOLIC BLOOD PRESSURE: 88 MMHG | WEIGHT: 261.25 LBS | BODY MASS INDEX: 39.59 KG/M2 | SYSTOLIC BLOOD PRESSURE: 138 MMHG | OXYGEN SATURATION: 98 % | HEIGHT: 68 IN | TEMPERATURE: 98.3 F

## 2018-04-13 DIAGNOSIS — I10 ESSENTIAL HYPERTENSION: Primary | ICD-10-CM

## 2018-04-13 RX ORDER — AMLODIPINE BESYLATE 10 MG/1
TABLET ORAL
Qty: 90 TABLET | Refills: 1 | Status: SHIPPED | OUTPATIENT
Start: 2018-04-13 | End: 2018-08-17 | Stop reason: SDUPTHER

## 2018-04-13 NOTE — TELEPHONE ENCOUNTER
Called patient home and cell phone number left message to return call to schedule follow up appointment in June

## 2018-04-16 NOTE — TELEPHONE ENCOUNTER
Spoke to pt he states that he needs a late night apt he will call back in May to set up a late night apt for Diann

## 2018-04-28 DIAGNOSIS — I10 ESSENTIAL HYPERTENSION: Primary | ICD-10-CM

## 2018-04-29 RX ORDER — LOSARTAN POTASSIUM 50 MG/1
TABLET ORAL
Qty: 90 TABLET | Refills: 2 | Status: SHIPPED | OUTPATIENT
Start: 2018-04-29 | End: 2018-07-16 | Stop reason: SDUPTHER

## 2018-04-30 ENCOUNTER — TELEPHONE (OUTPATIENT)
Dept: INTERNAL MEDICINE CLINIC | Facility: CLINIC | Age: 53
End: 2018-04-30

## 2018-06-13 ENCOUNTER — OFFICE VISIT (OUTPATIENT)
Dept: INTERNAL MEDICINE CLINIC | Facility: CLINIC | Age: 53
End: 2018-06-13

## 2018-06-13 VITALS
DIASTOLIC BLOOD PRESSURE: 98 MMHG | WEIGHT: 247 LBS | OXYGEN SATURATION: 97 % | BODY MASS INDEX: 37.44 KG/M2 | HEIGHT: 68 IN | SYSTOLIC BLOOD PRESSURE: 152 MMHG | HEART RATE: 88 BPM | RESPIRATION RATE: 18 BRPM

## 2018-06-13 DIAGNOSIS — Z12.5 SCREENING FOR PROSTATE CANCER: ICD-10-CM

## 2018-06-13 DIAGNOSIS — R73.9 ELEVATED BLOOD SUGAR: ICD-10-CM

## 2018-06-13 DIAGNOSIS — E78.5 ELEVATED LIPIDS: ICD-10-CM

## 2018-06-13 DIAGNOSIS — F32.1 MAJOR DEPRESSIVE DISORDER, SINGLE EPISODE, MODERATE (HCC): ICD-10-CM

## 2018-06-13 DIAGNOSIS — I10 BENIGN HYPERTENSION: Primary | ICD-10-CM

## 2018-06-13 DIAGNOSIS — E55.9 VITAMIN D DEFICIENCY: ICD-10-CM

## 2018-06-13 PROCEDURE — 99214 OFFICE O/P EST MOD 30 MIN: CPT | Performed by: PHYSICIAN ASSISTANT

## 2018-06-13 PROCEDURE — 1036F TOBACCO NON-USER: CPT | Performed by: PHYSICIAN ASSISTANT

## 2018-06-13 NOTE — PROGRESS NOTES
Assessment/Plan:      Patient Instructions   Encouraged patient to take his blood pressure medicine on a regular basis  Will check labs and schedule follow-up visit in 4 weeks to review  Encourage patient back to counseling  Keep active, keep the weight going in the right direction as you are doing  Return in about 4 weeks (around 7/11/2018) for Next scheduled follow up  Diagnoses and all orders for this visit:    Benign hypertension    Vitamin D deficiency  -     Vitamin D 25 hydroxy; Future    Elevated lipids  -     Comprehensive metabolic panel; Future  -     Lipid panel; Future    Elevated blood sugar  -     Hemoglobin A1C; Future    Screening for prostate cancer  -     PSA, Total Screen; Future    Major depressive disorder, single episode, moderate (HCC)  -     CBC and differential; Future          Subjective:      Patient ID: Shyla Reilly is a 48 y o  male  Follow-up    Since last visit, patient has been going through a divorce, and trying to tie up loose ends relating to his parents estate  Although the divorce was somewhat surprising, when he looks back on it he really is not surprised at all  He feels he is doing well, does not think he needs any medication as he is not as depressed as he was when his parents passed away  He is exercising on a regular basis  He has been eating better, he has lost almost 20 lb  Hypertension:  Not at goal   He admits he has not been taking his antihypertensives on a regular basis  Sidra cp, palp, sob, edema, HA, dizziness, diaphoresis, syncope, visual disturbance  Patient encouraged to take his medications as prescribed, we reviewed end-organ disease caused by hypertension  He reports he is exercising on a regular basis, running at least 3 times a week for 3 miles at a time  Vitamin-D deficiency:  Has not been taking his vitamin-D      Patient will need to repeat labs as last time his lipids were slightly elevated as was a fasting blood sugar     Denies any focal concerns today  ALLERGIES:  No Known Allergies    CURRENT MEDICATIONS:    Current Outpatient Prescriptions:     amLODIPine (NORVASC) 10 mg tablet, TAKE 1 TABLET BY MOUTH EVERY DAY, Disp: 90 tablet, Rfl: 1    losartan (COZAAR) 50 mg tablet, TAKE 1 TABLET EVERY DAY, Disp: 90 tablet, Rfl: 2    ACTIVE PROBLEM LIST:  Patient Active Problem List   Diagnosis    Benign hypertension    Elevated blood sugar    Elevated lipids    LVH (left ventricular hypertrophy)    Major depressive disorder, single episode, moderate (HCC)    Obesity    Vitamin D deficiency       PAST MEDICAL HISTORY:  Past Medical History:   Diagnosis Date    Dyspnea on exertion     last assessed: 3/11/2016    Hypertension     Major depressive disorder, single episode, moderate (HCC) 12/19/2016       PAST SURGICAL HISTORY:  Past Surgical History:   Procedure Laterality Date    HERNIA REPAIR      TONSILLECTOMY         FAMILY HISTORY:  Family History   Problem Relation Age of Onset    Leukemia Mother      acute myeloid    Hypertension Mother     Multiple myeloma Mother     Diabetes Father      mellitus    Hypertension Father     Pneumonia Father      NSIP (nonspecific interstital pneumonia)       SOCIAL HISTORY:  Social History     Social History    Marital status: /Civil Union     Spouse name: N/A    Number of children: 0    Years of education: N/A     Occupational History     for [employer]      full-time employment     Social History Main Topics    Smoking status: Never Smoker    Smokeless tobacco: Never Used    Alcohol use Yes      Comment: no alchol use (as per allscripts)    Drug use: No    Sexual activity: Not on file     Other Topics Concern    Not on file     Social History Narrative    Brushes teeth twice a day    Dental care, regularly    Exercise: walking               Review of Systems   Constitutional: Negative for activity change, chills, fatigue and fever     HENT: Negative for congestion  Eyes: Negative for discharge  Respiratory: Negative for cough, chest tightness and shortness of breath  Cardiovascular: Negative for chest pain, palpitations and leg swelling  Gastrointestinal: Negative for abdominal pain  Genitourinary: Negative for difficulty urinating  Musculoskeletal: Negative for arthralgias and myalgias  Skin: Negative for rash  Allergic/Immunologic: Negative for immunocompromised state  Neurological: Negative for dizziness, syncope, weakness, light-headedness and headaches  Hematological: Negative for adenopathy  Does not bruise/bleed easily  Psychiatric/Behavioral: Negative for dysphoric mood  The patient is not nervous/anxious  Objective:  Vitals:    06/13/18 1433   BP: 152/98   BP Location: Left arm   Patient Position: Sitting   Cuff Size: Large   Pulse: 88   Resp: 18   SpO2: 97%   Weight: 112 kg (247 lb)   Height: 5' 8" (1 727 m)        Physical Exam   Constitutional: He is oriented to person, place, and time  He appears well-developed and well-nourished  No distress  Obese   Neck: Neck supple  No JVD present  Carotid bruit is not present  Cardiovascular: Normal rate, regular rhythm and normal heart sounds  Pulmonary/Chest: Effort normal and breath sounds normal    Musculoskeletal: He exhibits no edema  Lymphadenopathy:     He has no cervical adenopathy  Neurological: He is alert and oriented to person, place, and time  Skin: Skin is warm and dry  No rash noted  Psychiatric: He has a normal mood and affect  His behavior is normal    Nursing note and vitals reviewed  RESULTS:    No results found for this or any previous visit (from the past 1008 hour(s))  This note was created with voice recognition software  Phonic, grammatical and spelling errors may be present within the note as a result

## 2018-06-13 NOTE — LETTER
June 13, 2018     Patient: Shyla Agent   YOB: 1965   Date of Visit: 6/13/2018       To Whom it May Concern:    Jeovany Maldonado is under my professional care  He was seen in my office on 6/13/2018  He may return to work on 6/14/18  If you have any questions or concerns, please don't hesitate to call           Sincerely,          Penny Peters PA-C        CC: No Recipients

## 2018-06-13 NOTE — PATIENT INSTRUCTIONS
Encouraged patient to take his blood pressure medicine on a regular basis  Will check labs and schedule follow-up visit in 4 weeks to review  Encourage patient back to counseling  Keep active, keep the weight going in the right direction as you are doing

## 2018-07-14 ENCOUNTER — APPOINTMENT (OUTPATIENT)
Dept: LAB | Facility: CLINIC | Age: 53
End: 2018-07-14
Payer: COMMERCIAL

## 2018-07-14 ENCOUNTER — TRANSCRIBE ORDERS (OUTPATIENT)
Dept: ADMINISTRATIVE | Facility: HOSPITAL | Age: 53
End: 2018-07-14

## 2018-07-14 DIAGNOSIS — E55.9 VITAMIN D DEFICIENCY: ICD-10-CM

## 2018-07-14 DIAGNOSIS — F32.1 MAJOR DEPRESSIVE DISORDER, SINGLE EPISODE, MODERATE (HCC): ICD-10-CM

## 2018-07-14 DIAGNOSIS — R73.9 ELEVATED BLOOD SUGAR: ICD-10-CM

## 2018-07-14 DIAGNOSIS — E78.5 ELEVATED LIPIDS: ICD-10-CM

## 2018-07-14 DIAGNOSIS — Z12.5 SCREENING FOR PROSTATE CANCER: ICD-10-CM

## 2018-07-14 LAB
25(OH)D3 SERPL-MCNC: 22.2 NG/ML (ref 30–100)
ALBUMIN SERPL BCP-MCNC: 3.5 G/DL (ref 3.5–5)
ALP SERPL-CCNC: 67 U/L (ref 46–116)
ALT SERPL W P-5'-P-CCNC: 32 U/L (ref 12–78)
ANION GAP SERPL CALCULATED.3IONS-SCNC: 6 MMOL/L (ref 4–13)
AST SERPL W P-5'-P-CCNC: 29 U/L (ref 5–45)
BASOPHILS # BLD AUTO: 0.06 THOUSANDS/ΜL (ref 0–0.1)
BASOPHILS NFR BLD AUTO: 1 % (ref 0–1)
BILIRUB SERPL-MCNC: 0.32 MG/DL (ref 0.2–1)
BUN SERPL-MCNC: 14 MG/DL (ref 5–25)
CALCIUM SERPL-MCNC: 9 MG/DL (ref 8.3–10.1)
CHLORIDE SERPL-SCNC: 105 MMOL/L (ref 100–108)
CHOLEST SERPL-MCNC: 190 MG/DL (ref 50–200)
CO2 SERPL-SCNC: 29 MMOL/L (ref 21–32)
CREAT SERPL-MCNC: 1.1 MG/DL (ref 0.6–1.3)
EOSINOPHIL # BLD AUTO: 0.35 THOUSAND/ΜL (ref 0–0.61)
EOSINOPHIL NFR BLD AUTO: 7 % (ref 0–6)
ERYTHROCYTE [DISTWIDTH] IN BLOOD BY AUTOMATED COUNT: 13.4 % (ref 11.6–15.1)
EST. AVERAGE GLUCOSE BLD GHB EST-MCNC: 117 MG/DL
GFR SERPL CREATININE-BSD FRML MDRD: 88 ML/MIN/1.73SQ M
GLUCOSE P FAST SERPL-MCNC: 79 MG/DL (ref 65–99)
HBA1C MFR BLD: 5.7 % (ref 4.2–6.3)
HCT VFR BLD AUTO: 44 % (ref 36.5–49.3)
HDLC SERPL-MCNC: 68 MG/DL (ref 40–60)
HGB BLD-MCNC: 14.3 G/DL (ref 12–17)
IMM GRANULOCYTES # BLD AUTO: 0.05 THOUSAND/UL (ref 0–0.2)
IMM GRANULOCYTES NFR BLD AUTO: 1 % (ref 0–2)
LDLC SERPL CALC-MCNC: 102 MG/DL (ref 0–100)
LYMPHOCYTES # BLD AUTO: 1.19 THOUSANDS/ΜL (ref 0.6–4.47)
LYMPHOCYTES NFR BLD AUTO: 24 % (ref 14–44)
MCH RBC QN AUTO: 30.4 PG (ref 26.8–34.3)
MCHC RBC AUTO-ENTMCNC: 32.5 G/DL (ref 31.4–37.4)
MCV RBC AUTO: 93 FL (ref 82–98)
MONOCYTES # BLD AUTO: 0.53 THOUSAND/ΜL (ref 0.17–1.22)
MONOCYTES NFR BLD AUTO: 11 % (ref 4–12)
NEUTROPHILS # BLD AUTO: 2.71 THOUSANDS/ΜL (ref 1.85–7.62)
NEUTS SEG NFR BLD AUTO: 56 % (ref 43–75)
NONHDLC SERPL-MCNC: 122 MG/DL
NRBC BLD AUTO-RTO: 0 /100 WBCS
PLATELET # BLD AUTO: 319 THOUSANDS/UL (ref 149–390)
PMV BLD AUTO: 10 FL (ref 8.9–12.7)
POTASSIUM SERPL-SCNC: 3.8 MMOL/L (ref 3.5–5.3)
PROT SERPL-MCNC: 7.3 G/DL (ref 6.4–8.2)
PSA SERPL-MCNC: 1.1 NG/ML (ref 0–4)
RBC # BLD AUTO: 4.71 MILLION/UL (ref 3.88–5.62)
SODIUM SERPL-SCNC: 140 MMOL/L (ref 136–145)
TRIGL SERPL-MCNC: 101 MG/DL
WBC # BLD AUTO: 4.89 THOUSAND/UL (ref 4.31–10.16)

## 2018-07-14 PROCEDURE — 80053 COMPREHEN METABOLIC PANEL: CPT

## 2018-07-14 PROCEDURE — 82306 VITAMIN D 25 HYDROXY: CPT

## 2018-07-14 PROCEDURE — G0103 PSA SCREENING: HCPCS

## 2018-07-14 PROCEDURE — 83036 HEMOGLOBIN GLYCOSYLATED A1C: CPT

## 2018-07-14 PROCEDURE — 36415 COLL VENOUS BLD VENIPUNCTURE: CPT

## 2018-07-14 PROCEDURE — 85025 COMPLETE CBC W/AUTO DIFF WBC: CPT

## 2018-07-14 PROCEDURE — 80061 LIPID PANEL: CPT

## 2018-07-16 ENCOUNTER — OFFICE VISIT (OUTPATIENT)
Dept: INTERNAL MEDICINE CLINIC | Facility: CLINIC | Age: 53
End: 2018-07-16
Payer: COMMERCIAL

## 2018-07-16 VITALS
RESPIRATION RATE: 20 BRPM | TEMPERATURE: 98.4 F | OXYGEN SATURATION: 97 % | HEART RATE: 78 BPM | BODY MASS INDEX: 36.83 KG/M2 | SYSTOLIC BLOOD PRESSURE: 138 MMHG | HEIGHT: 68 IN | WEIGHT: 243 LBS | DIASTOLIC BLOOD PRESSURE: 78 MMHG

## 2018-07-16 DIAGNOSIS — I10 BENIGN HYPERTENSION: Primary | ICD-10-CM

## 2018-07-16 DIAGNOSIS — I10 ESSENTIAL HYPERTENSION: ICD-10-CM

## 2018-07-16 DIAGNOSIS — G47.9 SLEEPING DIFFICULTY: ICD-10-CM

## 2018-07-16 DIAGNOSIS — E55.9 VITAMIN D DEFICIENCY: ICD-10-CM

## 2018-07-16 DIAGNOSIS — G89.29 CHRONIC PAIN OF RIGHT KNEE: ICD-10-CM

## 2018-07-16 DIAGNOSIS — M25.561 CHRONIC PAIN OF RIGHT KNEE: ICD-10-CM

## 2018-07-16 PROCEDURE — 99214 OFFICE O/P EST MOD 30 MIN: CPT | Performed by: PHYSICIAN ASSISTANT

## 2018-07-16 PROCEDURE — 3075F SYST BP GE 130 - 139MM HG: CPT | Performed by: PHYSICIAN ASSISTANT

## 2018-07-16 PROCEDURE — 3008F BODY MASS INDEX DOCD: CPT | Performed by: PHYSICIAN ASSISTANT

## 2018-07-16 PROCEDURE — 3078F DIAST BP <80 MM HG: CPT | Performed by: PHYSICIAN ASSISTANT

## 2018-07-16 RX ORDER — LOSARTAN POTASSIUM 50 MG/1
50 TABLET ORAL DAILY
Qty: 90 TABLET | Refills: 1 | Status: SHIPPED | OUTPATIENT
Start: 2018-07-16 | End: 2019-04-11 | Stop reason: SDUPTHER

## 2018-07-16 RX ORDER — ERGOCALCIFEROL 1.25 MG/1
50000 CAPSULE ORAL WEEKLY
Qty: 4 CAPSULE | Refills: 5 | Status: SHIPPED | OUTPATIENT
Start: 2018-07-16 | End: 2019-02-15 | Stop reason: SDUPTHER

## 2018-07-16 NOTE — PROGRESS NOTES
Assessment/Plan:   Patient Instructions   Continue current medications  Will schedule orthopedic evaluation for ongoing right knee pain  Advise OTC melatonin to try for sleep  Will re-initiate vitamin D therapy  Schedule follow-up 6 months with repeat labs, sooner as needed  Return in about 6 months (around 1/16/2019) for Next scheduled follow up  Diagnoses and all orders for this visit:    Benign hypertension  -     Comprehensive metabolic panel; Future    Vitamin D deficiency  -     ergocalciferol (VITAMIN D2) 50,000 units; Take 1 capsule (50,000 Units total) by mouth once a week  -     Vitamin D 25 hydroxy; Future    Chronic pain of right knee  -     Ambulatory referral to Orthopedic Surgery; Future    Sleeping difficulty    Essential hypertension  -     losartan (COZAAR) 50 mg tablet; Take 1 tablet (50 mg total) by mouth daily  -     Lipid panel; Future          Subjective:      Patient ID: Tona Covington is a 48 y o  male  Follow-up    Hypertension:  Controlled  Sidra cp, palp, sob, edema, HA, dizziness, diaphoresis, syncope, visual disturbance  Previously impaired fasting glucose  Normal A1c with these labs and normal fasting sugar  Patient has lost significant weight, and is exercising regularly  Vitamin-D deficiency:  Vitamin-D level remains low at 22  He had stopped to supplement  No complaint of muscle cramping  Pain right knee x2 months  Patient states he can exercise like run on the knee without any difficulty  But if he is riding on the bus from [place] to this area in the evening and has his right knee flexed for the period of time, the right knee becomes very achy  He has not noted any swelling nor instability  Sleeping difficulty  He notes with everything going on in his life he has had some difficulty sleeping    States he can fall asleep but only stays asleep a few hours and then wakes up with his mind racing on things he plans to do the next day etc         ALLERGIES:  No Known Allergies    CURRENT MEDICATIONS:    Current Outpatient Prescriptions:     amLODIPine (NORVASC) 10 mg tablet, TAKE 1 TABLET BY MOUTH EVERY DAY, Disp: 90 tablet, Rfl: 1    losartan (COZAAR) 50 mg tablet, Take 1 tablet (50 mg total) by mouth daily, Disp: 90 tablet, Rfl: 1    ergocalciferol (VITAMIN D2) 50,000 units, Take 1 capsule (50,000 Units total) by mouth once a week, Disp: 4 capsule, Rfl: 5    ACTIVE PROBLEM LIST:  Patient Active Problem List   Diagnosis    Benign hypertension    Elevated blood sugar    Elevated lipids    LVH (left ventricular hypertrophy)    Major depressive disorder, single episode, moderate (HCC)    Obesity    Vitamin D deficiency    Chronic pain of right knee    Sleeping difficulty       PAST MEDICAL HISTORY:  Past Medical History:   Diagnosis Date    Dyspnea on exertion     last assessed: 3/11/2016    Hypertension     Major depressive disorder, single episode, moderate (HCC) 12/19/2016       PAST SURGICAL HISTORY:  Past Surgical History:   Procedure Laterality Date    HERNIA REPAIR      TONSILLECTOMY         FAMILY HISTORY:  Family History   Problem Relation Age of Onset    Leukemia Mother         acute myeloid    Hypertension Mother     Multiple myeloma Mother     Diabetes Father         mellitus    Hypertension Father     Pneumonia Father         NSIP (nonspecific interstital pneumonia)       SOCIAL HISTORY:  Social History     Social History    Marital status: Legally      Spouse name: N/A    Number of children: 0    Years of education: N/A     Occupational History     for [employer]      full-time employment     Social History Main Topics    Smoking status: Never Smoker    Smokeless tobacco: Never Used    Alcohol use Yes      Comment: no alchol use (as per allscripts)    Drug use: No    Sexual activity: Not on file     Other Topics Concern    Not on file     Social History Narrative    Brushes teeth twice a day    Dental care, regularly    Exercise: walking               Review of Systems   Constitutional: Negative for activity change, chills, fatigue and fever  HENT: Negative for congestion  Eyes: Negative for discharge  Respiratory: Negative for cough, chest tightness and shortness of breath  Cardiovascular: Negative for chest pain, palpitations and leg swelling  Gastrointestinal: Negative for abdominal pain  Genitourinary: Negative for difficulty urinating  Musculoskeletal: Positive for arthralgias  Negative for myalgias  Skin: Negative for rash  Allergic/Immunologic: Negative for immunocompromised state  Neurological: Negative for dizziness, syncope, weakness, light-headedness and headaches  Hematological: Negative for adenopathy  Does not bruise/bleed easily  Psychiatric/Behavioral: Negative for dysphoric mood  The patient is not nervous/anxious  Objective:  Vitals:    07/16/18 1520   BP: 138/78   BP Location: Left arm   Patient Position: Sitting   Cuff Size: Adult   Pulse: 78   Resp: 20   Temp: 98 4 °F (36 9 °C)   TempSrc: Temporal   SpO2: 97%   Weight: 110 kg (243 lb)   Height: 5' 8" (1 727 m)        Physical Exam   Constitutional: He is oriented to person, place, and time  He appears well-developed and well-nourished  No distress  Neck: Neck supple  Carotid bruit is not present  Cardiovascular: Normal rate, regular rhythm and normal heart sounds  Pulmonary/Chest: Effort normal and breath sounds normal    Abdominal: Soft  Bowel sounds are normal    Musculoskeletal: He exhibits no edema  Minimal apparent swelling of the right knee  No instability  No tenderness on range of motion  Dana's is negative  Lymphadenopathy:     He has no cervical adenopathy  Neurological: He is alert and oriented to person, place, and time  Skin: Skin is warm and dry  No rash noted  Psychiatric: He has a normal mood and affect   His behavior is normal    Nursing note and vitals reviewed          RESULTS:    Recent Results (from the past 1008 hour(s))   PSA, Total Screen    Collection Time: 07/14/18  8:29 AM   Result Value Ref Range    PSA 1 1 0 0 - 4 0 ng/mL   Vitamin D 25 hydroxy    Collection Time: 07/14/18  8:29 AM   Result Value Ref Range    Vit D, 25-Hydroxy 22 2 (L) 30 0 - 100 0 ng/mL   CBC and differential    Collection Time: 07/14/18  8:29 AM   Result Value Ref Range    WBC 4 89 4 31 - 10 16 Thousand/uL    RBC 4 71 3 88 - 5 62 Million/uL    Hemoglobin 14 3 12 0 - 17 0 g/dL    Hematocrit 44 0 36 5 - 49 3 %    MCV 93 82 - 98 fL    MCH 30 4 26 8 - 34 3 pg    MCHC 32 5 31 4 - 37 4 g/dL    RDW 13 4 11 6 - 15 1 %    MPV 10 0 8 9 - 12 7 fL    Platelets 115 262 - 658 Thousands/uL    nRBC 0 /100 WBCs    Neutrophils Relative 56 43 - 75 %    Immat GRANS % 1 0 - 2 %    Lymphocytes Relative 24 14 - 44 %    Monocytes Relative 11 4 - 12 %    Eosinophils Relative 7 (H) 0 - 6 %    Basophils Relative 1 0 - 1 %    Neutrophils Absolute 2 71 1 85 - 7 62 Thousands/µL    Immature Grans Absolute 0 05 0 00 - 0 20 Thousand/uL    Lymphocytes Absolute 1 19 0 60 - 4 47 Thousands/µL    Monocytes Absolute 0 53 0 17 - 1 22 Thousand/µL    Eosinophils Absolute 0 35 0 00 - 0 61 Thousand/µL    Basophils Absolute 0 06 0 00 - 0 10 Thousands/µL   Comprehensive metabolic panel    Collection Time: 07/14/18  8:29 AM   Result Value Ref Range    Sodium 140 136 - 145 mmol/L    Potassium 3 8 3 5 - 5 3 mmol/L    Chloride 105 100 - 108 mmol/L    CO2 29 21 - 32 mmol/L    Anion Gap 6 4 - 13 mmol/L    BUN 14 5 - 25 mg/dL    Creatinine 1 10 0 60 - 1 30 mg/dL    Glucose, Fasting 79 65 - 99 mg/dL    Calcium 9 0 8 3 - 10 1 mg/dL    AST 29 5 - 45 U/L    ALT 32 12 - 78 U/L    Alkaline Phosphatase 67 46 - 116 U/L    Total Protein 7 3 6 4 - 8 2 g/dL    Albumin 3 5 3 5 - 5 0 g/dL    Total Bilirubin 0 32 0 20 - 1 00 mg/dL    eGFR 88 ml/min/1 73sq m   Lipid panel    Collection Time: 07/14/18  8:29 AM   Result Value Ref Range    Cholesterol 190 50 - 200 mg/dL    Triglycerides 101 <=150 mg/dL    HDL, Direct 68 (H) 40 - 60 mg/dL    LDL Calculated 102 (H) 0 - 100 mg/dL    Non-HDL-Chol (CHOL-HDL) 122 mg/dl   Hemoglobin A1C    Collection Time: 07/14/18  8:29 AM   Result Value Ref Range    Hemoglobin A1C 5 7 4 2 - 6 3 %     mg/dl       This note was created with voice recognition software  Phonic, grammatical and spelling errors may be present within the note as a result

## 2018-07-16 NOTE — PATIENT INSTRUCTIONS
Continue current medications  Will schedule orthopedic evaluation for ongoing right knee pain  Advise OTC melatonin to try for sleep  Will re-initiate vitamin D therapy  Schedule follow-up 6 months with repeat labs, sooner as needed

## 2018-08-17 DIAGNOSIS — I10 ESSENTIAL HYPERTENSION: ICD-10-CM

## 2018-08-17 RX ORDER — AMLODIPINE BESYLATE 10 MG/1
10 TABLET ORAL DAILY
Qty: 90 TABLET | Refills: 1 | Status: SHIPPED | OUTPATIENT
Start: 2018-08-17 | End: 2019-02-14 | Stop reason: SDUPTHER

## 2019-02-01 DIAGNOSIS — I10 ESSENTIAL HYPERTENSION: ICD-10-CM

## 2019-02-01 RX ORDER — LOSARTAN POTASSIUM 50 MG/1
TABLET ORAL
Qty: 90 TABLET | Refills: 1 | Status: SHIPPED | OUTPATIENT
Start: 2019-02-01 | End: 2019-04-02 | Stop reason: SDUPTHER

## 2019-02-14 ENCOUNTER — TELEPHONE (OUTPATIENT)
Dept: INTERNAL MEDICINE CLINIC | Facility: CLINIC | Age: 54
End: 2019-02-14

## 2019-02-14 DIAGNOSIS — I10 ESSENTIAL HYPERTENSION: ICD-10-CM

## 2019-02-14 RX ORDER — AMLODIPINE BESYLATE 10 MG/1
TABLET ORAL
Qty: 90 TABLET | Refills: 0 | Status: SHIPPED | OUTPATIENT
Start: 2019-02-14 | End: 2019-04-11 | Stop reason: SDUPTHER

## 2019-02-15 DIAGNOSIS — E55.9 VITAMIN D DEFICIENCY: ICD-10-CM

## 2019-02-15 RX ORDER — ERGOCALCIFEROL 1.25 MG/1
CAPSULE ORAL
Qty: 12 CAPSULE | Refills: 0 | Status: SHIPPED | OUTPATIENT
Start: 2019-02-15 | End: 2019-04-11 | Stop reason: SDUPTHER

## 2019-04-01 ENCOUNTER — HOSPITAL ENCOUNTER (EMERGENCY)
Facility: HOSPITAL | Age: 54
Discharge: HOME | End: 2019-04-01
Attending: EMERGENCY MEDICINE | Admitting: EMERGENCY MEDICINE
Payer: COMMERCIAL

## 2019-04-01 ENCOUNTER — TELEPHONE (OUTPATIENT)
Dept: INTERNAL MEDICINE CLINIC | Facility: CLINIC | Age: 54
End: 2019-04-01

## 2019-04-01 VITALS
DIASTOLIC BLOOD PRESSURE: 106 MMHG | RESPIRATION RATE: 17 BRPM | HEART RATE: 78 BPM | OXYGEN SATURATION: 95 % | TEMPERATURE: 97.9 F | WEIGHT: 257.43 LBS | SYSTOLIC BLOOD PRESSURE: 215 MMHG | BODY MASS INDEX: 39.14 KG/M2

## 2019-04-01 DIAGNOSIS — M10.9 GOUTY ARTHRITIS OF RIGHT GREAT TOE: Primary | ICD-10-CM

## 2019-04-01 PROCEDURE — 99283 EMERGENCY DEPT VISIT LOW MDM: CPT

## 2019-04-01 PROCEDURE — 99283 EMERGENCY DEPT VISIT LOW MDM: CPT | Performed by: PHYSICIAN ASSISTANT

## 2019-04-01 RX ORDER — INDOMETHACIN 50 MG/1
50 CAPSULE ORAL 2 TIMES DAILY WITH MEALS
Qty: 20 CAPSULE | Refills: 0 | Status: SHIPPED | OUTPATIENT
Start: 2019-04-01 | End: 2019-04-02 | Stop reason: ALTCHOICE

## 2019-04-01 RX ORDER — INDOMETHACIN 25 MG/1
50 CAPSULE ORAL ONCE
Status: COMPLETED | OUTPATIENT
Start: 2019-04-01 | End: 2019-04-01

## 2019-04-01 RX ADMIN — INDOMETHACIN 50 MG: 25 CAPSULE ORAL at 01:31

## 2019-04-02 ENCOUNTER — APPOINTMENT (OUTPATIENT)
Dept: LAB | Facility: HOSPITAL | Age: 54
End: 2019-04-02
Payer: COMMERCIAL

## 2019-04-02 ENCOUNTER — OFFICE VISIT (OUTPATIENT)
Dept: INTERNAL MEDICINE CLINIC | Facility: CLINIC | Age: 54
End: 2019-04-02
Payer: COMMERCIAL

## 2019-04-02 VITALS
HEIGHT: 68 IN | DIASTOLIC BLOOD PRESSURE: 86 MMHG | SYSTOLIC BLOOD PRESSURE: 150 MMHG | OXYGEN SATURATION: 99 % | HEART RATE: 101 BPM | BODY MASS INDEX: 37.59 KG/M2 | WEIGHT: 248 LBS | RESPIRATION RATE: 17 BRPM

## 2019-04-02 DIAGNOSIS — M79.672 LEFT FOOT PAIN: Primary | ICD-10-CM

## 2019-04-02 DIAGNOSIS — I10 BENIGN HYPERTENSION: ICD-10-CM

## 2019-04-02 DIAGNOSIS — M79.672 LEFT FOOT PAIN: ICD-10-CM

## 2019-04-02 DIAGNOSIS — I10 ESSENTIAL HYPERTENSION: ICD-10-CM

## 2019-04-02 DIAGNOSIS — E55.9 VITAMIN D DEFICIENCY: ICD-10-CM

## 2019-04-02 LAB
25(OH)D3 SERPL-MCNC: 38.7 NG/ML (ref 30–100)
ALBUMIN SERPL BCP-MCNC: 3.9 G/DL (ref 3.5–5)
ALP SERPL-CCNC: 78 U/L (ref 46–116)
ALT SERPL W P-5'-P-CCNC: 24 U/L (ref 12–78)
ANION GAP SERPL CALCULATED.3IONS-SCNC: 6 MMOL/L (ref 4–13)
AST SERPL W P-5'-P-CCNC: 16 U/L (ref 5–45)
BASOPHILS # BLD AUTO: 0.04 THOUSANDS/ΜL (ref 0–0.1)
BASOPHILS NFR BLD AUTO: 1 % (ref 0–1)
BILIRUB SERPL-MCNC: 0.5 MG/DL (ref 0.2–1)
BUN SERPL-MCNC: 16 MG/DL (ref 5–25)
CALCIUM SERPL-MCNC: 9.6 MG/DL (ref 8.3–10.1)
CHLORIDE SERPL-SCNC: 105 MMOL/L (ref 100–108)
CHOLEST SERPL-MCNC: 232 MG/DL (ref 50–200)
CO2 SERPL-SCNC: 34 MMOL/L (ref 21–32)
CREAT SERPL-MCNC: 1.28 MG/DL (ref 0.6–1.3)
EOSINOPHIL # BLD AUTO: 0.24 THOUSAND/ΜL (ref 0–0.61)
EOSINOPHIL NFR BLD AUTO: 5 % (ref 0–6)
ERYTHROCYTE [DISTWIDTH] IN BLOOD BY AUTOMATED COUNT: 13.2 % (ref 11.6–15.1)
GFR SERPL CREATININE-BSD FRML MDRD: 73 ML/MIN/1.73SQ M
GLUCOSE P FAST SERPL-MCNC: 114 MG/DL (ref 65–99)
HCT VFR BLD AUTO: 45.5 % (ref 36.5–49.3)
HDLC SERPL-MCNC: 89 MG/DL (ref 40–60)
HGB BLD-MCNC: 15.2 G/DL (ref 12–17)
IMM GRANULOCYTES # BLD AUTO: 0.03 THOUSAND/UL (ref 0–0.2)
IMM GRANULOCYTES NFR BLD AUTO: 1 % (ref 0–2)
LDLC SERPL CALC-MCNC: 119 MG/DL (ref 0–100)
LYMPHOCYTES # BLD AUTO: 0.83 THOUSANDS/ΜL (ref 0.6–4.47)
LYMPHOCYTES NFR BLD AUTO: 18 % (ref 14–44)
MCH RBC QN AUTO: 30.4 PG (ref 26.8–34.3)
MCHC RBC AUTO-ENTMCNC: 33.4 G/DL (ref 31.4–37.4)
MCV RBC AUTO: 91 FL (ref 82–98)
MONOCYTES # BLD AUTO: 0.58 THOUSAND/ΜL (ref 0.17–1.22)
MONOCYTES NFR BLD AUTO: 13 % (ref 4–12)
NEUTROPHILS # BLD AUTO: 2.87 THOUSANDS/ΜL (ref 1.85–7.62)
NEUTS SEG NFR BLD AUTO: 62 % (ref 43–75)
NONHDLC SERPL-MCNC: 143 MG/DL
NRBC BLD AUTO-RTO: 0 /100 WBCS
PLATELET # BLD AUTO: 327 THOUSANDS/UL (ref 149–390)
PMV BLD AUTO: 9.9 FL (ref 8.9–12.7)
POTASSIUM SERPL-SCNC: 4.4 MMOL/L (ref 3.5–5.3)
PROT SERPL-MCNC: 8 G/DL (ref 6.4–8.2)
RBC # BLD AUTO: 5 MILLION/UL (ref 3.88–5.62)
SODIUM SERPL-SCNC: 145 MMOL/L (ref 136–145)
TRIGL SERPL-MCNC: 119 MG/DL
URATE SERPL-MCNC: 7.1 MG/DL (ref 4.2–8)
WBC # BLD AUTO: 4.59 THOUSAND/UL (ref 4.31–10.16)

## 2019-04-02 PROCEDURE — 84550 ASSAY OF BLOOD/URIC ACID: CPT

## 2019-04-02 PROCEDURE — 99213 OFFICE O/P EST LOW 20 MIN: CPT | Performed by: PHYSICIAN ASSISTANT

## 2019-04-02 PROCEDURE — 85025 COMPLETE CBC W/AUTO DIFF WBC: CPT

## 2019-04-02 PROCEDURE — 80061 LIPID PANEL: CPT

## 2019-04-02 PROCEDURE — 36415 COLL VENOUS BLD VENIPUNCTURE: CPT

## 2019-04-02 PROCEDURE — 80053 COMPREHEN METABOLIC PANEL: CPT

## 2019-04-02 PROCEDURE — 82306 VITAMIN D 25 HYDROXY: CPT

## 2019-04-02 RX ORDER — METHYLPREDNISOLONE 4 MG/1
TABLET ORAL
Qty: 21 TABLET | Refills: 0 | Status: SHIPPED | OUTPATIENT
Start: 2019-04-02 | End: 2019-04-11 | Stop reason: ALTCHOICE

## 2019-04-11 ENCOUNTER — OFFICE VISIT (OUTPATIENT)
Dept: INTERNAL MEDICINE CLINIC | Facility: CLINIC | Age: 54
End: 2019-04-11
Payer: COMMERCIAL

## 2019-04-11 VITALS
BODY MASS INDEX: 38.19 KG/M2 | SYSTOLIC BLOOD PRESSURE: 142 MMHG | RESPIRATION RATE: 17 BRPM | HEART RATE: 71 BPM | HEIGHT: 68 IN | WEIGHT: 252 LBS | OXYGEN SATURATION: 98 % | DIASTOLIC BLOOD PRESSURE: 86 MMHG

## 2019-04-11 DIAGNOSIS — Z11.59 NEED FOR HEPATITIS C SCREENING TEST: ICD-10-CM

## 2019-04-11 DIAGNOSIS — R73.01 IMPAIRED FASTING GLUCOSE: ICD-10-CM

## 2019-04-11 DIAGNOSIS — I10 BENIGN HYPERTENSION: ICD-10-CM

## 2019-04-11 DIAGNOSIS — E55.9 VITAMIN D DEFICIENCY: ICD-10-CM

## 2019-04-11 DIAGNOSIS — I10 ESSENTIAL HYPERTENSION: ICD-10-CM

## 2019-04-11 DIAGNOSIS — Z00.00 ANNUAL PHYSICAL EXAM: Primary | ICD-10-CM

## 2019-04-11 DIAGNOSIS — E78.5 ELEVATED LIPIDS: ICD-10-CM

## 2019-04-11 PROBLEM — R73.9 ELEVATED BLOOD SUGAR: Status: RESOLVED | Noted: 2018-01-03 | Resolved: 2019-04-11

## 2019-04-11 PROCEDURE — 99396 PREV VISIT EST AGE 40-64: CPT | Performed by: PHYSICIAN ASSISTANT

## 2019-04-11 RX ORDER — LOSARTAN POTASSIUM 100 MG/1
100 TABLET ORAL DAILY
Qty: 90 TABLET | Refills: 1 | Status: SHIPPED | OUTPATIENT
Start: 2019-04-11 | End: 2019-10-11 | Stop reason: SDUPTHER

## 2019-04-11 RX ORDER — AMLODIPINE BESYLATE 10 MG/1
10 TABLET ORAL DAILY
Qty: 90 TABLET | Refills: 1 | Status: SHIPPED | OUTPATIENT
Start: 2019-04-11 | End: 2021-12-06 | Stop reason: SDUPTHER

## 2019-04-11 RX ORDER — ERGOCALCIFEROL 1.25 MG/1
50000 CAPSULE ORAL WEEKLY
Qty: 12 CAPSULE | Refills: 1 | Status: SHIPPED | OUTPATIENT
Start: 2019-04-11 | End: 2019-10-04 | Stop reason: SDUPTHER

## 2019-05-06 ENCOUNTER — HOSPITAL ENCOUNTER (EMERGENCY)
Facility: HOSPITAL | Age: 54
Discharge: HOME | End: 2019-05-06
Attending: EMERGENCY MEDICINE
Payer: COMMERCIAL

## 2019-05-06 ENCOUNTER — APPOINTMENT (EMERGENCY)
Dept: RADIOLOGY | Facility: HOSPITAL | Age: 54
End: 2019-05-06
Payer: COMMERCIAL

## 2019-05-06 VITALS
RESPIRATION RATE: 18 BRPM | WEIGHT: 250 LBS | SYSTOLIC BLOOD PRESSURE: 183 MMHG | DIASTOLIC BLOOD PRESSURE: 114 MMHG | OXYGEN SATURATION: 97 % | BODY MASS INDEX: 37.89 KG/M2 | TEMPERATURE: 98.4 F | HEIGHT: 68 IN | HEART RATE: 74 BPM

## 2019-05-06 DIAGNOSIS — S83.412A SPRAIN OF MEDIAL COLLATERAL LIGAMENT OF LEFT KNEE, INITIAL ENCOUNTER: Primary | ICD-10-CM

## 2019-05-06 PROCEDURE — 99283 EMERGENCY DEPT VISIT LOW MDM: CPT | Performed by: PHYSICIAN ASSISTANT

## 2019-05-06 PROCEDURE — 99283 EMERGENCY DEPT VISIT LOW MDM: CPT

## 2019-05-06 PROCEDURE — 73564 X-RAY EXAM KNEE 4 OR MORE: CPT

## 2019-05-06 RX ORDER — TRAMADOL HYDROCHLORIDE 50 MG/1
50 TABLET ORAL EVERY 6 HOURS PRN
Qty: 20 TABLET | Refills: 0 | Status: SHIPPED | OUTPATIENT
Start: 2019-05-06 | End: 2019-05-11

## 2019-05-07 ENCOUNTER — OFFICE VISIT (OUTPATIENT)
Dept: OBGYN CLINIC | Facility: MEDICAL CENTER | Age: 54
End: 2019-05-07
Payer: COMMERCIAL

## 2019-05-07 VITALS
BODY MASS INDEX: 37.71 KG/M2 | SYSTOLIC BLOOD PRESSURE: 143 MMHG | DIASTOLIC BLOOD PRESSURE: 96 MMHG | WEIGHT: 248.8 LBS | HEART RATE: 85 BPM | HEIGHT: 68 IN

## 2019-05-07 DIAGNOSIS — M70.52 PES ANSERINUS BURSITIS OF LEFT KNEE: Primary | ICD-10-CM

## 2019-05-07 PROCEDURE — 99203 OFFICE O/P NEW LOW 30 MIN: CPT | Performed by: EMERGENCY MEDICINE

## 2019-05-07 PROCEDURE — 20610 DRAIN/INJ JOINT/BURSA W/O US: CPT | Performed by: EMERGENCY MEDICINE

## 2019-05-07 RX ORDER — LIDOCAINE HYDROCHLORIDE 10 MG/ML
1 INJECTION, SOLUTION INFILTRATION; PERINEURAL
Status: COMPLETED | OUTPATIENT
Start: 2019-05-07 | End: 2019-05-07

## 2019-05-07 RX ORDER — TRIAMCINOLONE ACETONIDE 40 MG/ML
40 INJECTION, SUSPENSION INTRA-ARTICULAR; INTRAMUSCULAR
Status: COMPLETED | OUTPATIENT
Start: 2019-05-07 | End: 2019-05-07

## 2019-05-07 RX ADMIN — LIDOCAINE HYDROCHLORIDE 1 ML: 10 INJECTION, SOLUTION INFILTRATION; PERINEURAL at 09:08

## 2019-05-07 RX ADMIN — TRIAMCINOLONE ACETONIDE 40 MG: 40 INJECTION, SUSPENSION INTRA-ARTICULAR; INTRAMUSCULAR at 09:08

## 2019-08-24 ENCOUNTER — APPOINTMENT (OUTPATIENT)
Dept: LAB | Facility: CLINIC | Age: 54
End: 2019-08-24
Payer: COMMERCIAL

## 2019-08-24 DIAGNOSIS — E78.5 ELEVATED LIPIDS: ICD-10-CM

## 2019-08-24 DIAGNOSIS — R73.01 IMPAIRED FASTING GLUCOSE: ICD-10-CM

## 2019-08-24 DIAGNOSIS — I10 BENIGN HYPERTENSION: ICD-10-CM

## 2019-08-24 DIAGNOSIS — Z11.59 NEED FOR HEPATITIS C SCREENING TEST: ICD-10-CM

## 2019-08-24 LAB
ALBUMIN SERPL BCP-MCNC: 3.9 G/DL (ref 3.5–5)
ALP SERPL-CCNC: 98 U/L (ref 46–116)
ALT SERPL W P-5'-P-CCNC: 23 U/L (ref 12–78)
ANION GAP SERPL CALCULATED.3IONS-SCNC: 5 MMOL/L (ref 4–13)
AST SERPL W P-5'-P-CCNC: 24 U/L (ref 5–45)
BILIRUB SERPL-MCNC: 0.32 MG/DL (ref 0.2–1)
BUN SERPL-MCNC: 19 MG/DL (ref 5–25)
CALCIUM SERPL-MCNC: 9.5 MG/DL (ref 8.3–10.1)
CHLORIDE SERPL-SCNC: 105 MMOL/L (ref 100–108)
CHOLEST SERPL-MCNC: 197 MG/DL (ref 50–200)
CO2 SERPL-SCNC: 27 MMOL/L (ref 21–32)
CREAT SERPL-MCNC: 1.57 MG/DL (ref 0.6–1.3)
EST. AVERAGE GLUCOSE BLD GHB EST-MCNC: 120 MG/DL
GFR SERPL CREATININE-BSD FRML MDRD: 57 ML/MIN/1.73SQ M
GLUCOSE P FAST SERPL-MCNC: 85 MG/DL (ref 65–99)
HBA1C MFR BLD: 5.8 % (ref 4.2–6.3)
HDLC SERPL-MCNC: 73 MG/DL (ref 40–60)
LDLC SERPL CALC-MCNC: 94 MG/DL (ref 0–100)
NONHDLC SERPL-MCNC: 124 MG/DL
POTASSIUM SERPL-SCNC: 4.3 MMOL/L (ref 3.5–5.3)
PROT SERPL-MCNC: 8.1 G/DL (ref 6.4–8.2)
SODIUM SERPL-SCNC: 137 MMOL/L (ref 136–145)
TRIGL SERPL-MCNC: 152 MG/DL

## 2019-08-24 PROCEDURE — 80053 COMPREHEN METABOLIC PANEL: CPT

## 2019-08-24 PROCEDURE — 36415 COLL VENOUS BLD VENIPUNCTURE: CPT

## 2019-08-24 PROCEDURE — 86803 HEPATITIS C AB TEST: CPT

## 2019-08-24 PROCEDURE — 80061 LIPID PANEL: CPT

## 2019-08-24 PROCEDURE — 83036 HEMOGLOBIN GLYCOSYLATED A1C: CPT

## 2019-08-25 LAB — HCV AB SER QL: NORMAL

## 2019-09-24 ENCOUNTER — OFFICE VISIT (OUTPATIENT)
Dept: INTERNAL MEDICINE CLINIC | Facility: CLINIC | Age: 54
End: 2019-09-24
Payer: COMMERCIAL

## 2019-09-24 VITALS
DIASTOLIC BLOOD PRESSURE: 100 MMHG | OXYGEN SATURATION: 98 % | WEIGHT: 233 LBS | HEIGHT: 68 IN | SYSTOLIC BLOOD PRESSURE: 184 MMHG | RESPIRATION RATE: 18 BRPM | BODY MASS INDEX: 35.31 KG/M2 | HEART RATE: 94 BPM

## 2019-09-24 DIAGNOSIS — I10 BENIGN HYPERTENSION: ICD-10-CM

## 2019-09-24 DIAGNOSIS — M79.10 MYALGIA: ICD-10-CM
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
## 2019-09-24 DIAGNOSIS — F32.1 MAJOR DEPRESSIVE DISORDER, SINGLE EPISODE, MODERATE (HCC): ICD-10-CM

## 2019-09-24 DIAGNOSIS — S13.9XXA NECK SPRAIN, INITIAL ENCOUNTER: ICD-10-CM

## 2019-09-24 PROCEDURE — 99214 OFFICE O/P EST MOD 30 MIN: CPT | Performed by: PHYSICIAN ASSISTANT

## 2019-09-24 RX ORDER — ESCITALOPRAM OXALATE 10 MG/1
10 TABLET ORAL DAILY
Qty: 30 TABLET | Refills: 5 | Status: SHIPPED | OUTPATIENT
Start: 2019-09-24 | End: 2019-10-30 | Stop reason: ALTCHOICE

## 2019-09-24 NOTE — LETTER
September 24, 2019     Patient: Shyla Agent   YOB: 1965   Date of Visit: 9/24/2019       To Whom it May Concern:    Jeovany Maldonado is under my professional care  He was seen in my office on 9/24/2019  He may return to work on 09/26/2019  Please excuse Kacy  from work 09/23/2019 through 09/25/2019 due to an acute illness  If you have any questions or concerns, please don't hesitate to call           Sincerely,          Penny Peters PA-C        CC: No Recipients
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
## 2019-10-04 DIAGNOSIS — E55.9 VITAMIN D DEFICIENCY: ICD-10-CM

## 2019-10-04 RX ORDER — ERGOCALCIFEROL 1.25 MG/1
CAPSULE ORAL
Qty: 12 CAPSULE | Refills: 1 | Status: SHIPPED | OUTPATIENT
Start: 2019-10-04 | End: 2020-08-10 | Stop reason: SDUPTHER

## 2019-10-11 DIAGNOSIS — I10 ESSENTIAL HYPERTENSION: ICD-10-CM

## 2019-10-11 DIAGNOSIS — I10 BENIGN HYPERTENSION: ICD-10-CM

## 2019-10-11 RX ORDER — LOSARTAN POTASSIUM 100 MG/1
TABLET ORAL
Qty: 90 TABLET | Refills: 1 | Status: SHIPPED | OUTPATIENT
Start: 2019-10-11 | End: 2021-04-20 | Stop reason: SDUPTHER

## 2019-10-23 ENCOUNTER — HOSPITAL ENCOUNTER (EMERGENCY)
Facility: HOSPITAL | Age: 54
Discharge: HOME | End: 2019-10-23
Attending: EMERGENCY MEDICINE | Admitting: EMERGENCY MEDICINE
Payer: COMMERCIAL

## 2019-10-23 VITALS
RESPIRATION RATE: 18 BRPM | DIASTOLIC BLOOD PRESSURE: 87 MMHG | HEART RATE: 82 BPM | SYSTOLIC BLOOD PRESSURE: 149 MMHG | OXYGEN SATURATION: 98 % | TEMPERATURE: 98.8 F

## 2019-10-23 DIAGNOSIS — M10.9 GOUTY ARTHRITIS OF LEFT GREAT TOE: Primary | ICD-10-CM

## 2019-10-23 PROCEDURE — 99284 EMERGENCY DEPT VISIT MOD MDM: CPT | Performed by: PHYSICIAN ASSISTANT

## 2019-10-23 PROCEDURE — 99283 EMERGENCY DEPT VISIT LOW MDM: CPT

## 2019-10-23 RX ORDER — PREDNISONE 10 MG/1
TABLET ORAL
Qty: 21 TABLET | Refills: 0 | Status: SHIPPED | OUTPATIENT
Start: 2019-10-23 | End: 2019-11-01

## 2019-10-23 RX ADMIN — PREDNISONE 50 MG: 20 TABLET ORAL at 17:46

## 2019-10-23 NOTE — ED PROVIDER NOTES
History  Chief Complaint   Patient presents with    Toe Swelling     "feels like gout but I've never had gout before  I've had it once but I couldn't put my shoes on"  left great toe     50yo male with a PMH of HTN presenting for evaluation of left big toe pain and swelling x 1 day  The pain started without trauma to the area  Patient states he believes that he has gout and believes his symptoms are the result of a recent herbal supplement that he has been taking  No previous documented diagnosis of gout  He has used naproxen and Tramadol without relief  Patient otherwise feels well and denies fevers, chills, numbness/tingling  History provided by:  Patient and medical records   used: No    Toe Pain   Location:  Left big toe  Quality:  Throbbing  Severity:  Moderate  Onset quality:  Sudden  Timing:  Constant  Progression:  Unchanged  Chronicity:  New  Relieved by:  Nothing  Worsened by: Movement  Ineffective treatments:  Naproxen, Tramadol  Associated symptoms: no abdominal pain, no chest pain, no fatigue, no fever, no rash, no shortness of breath and no vomiting        Prior to Admission Medications   Prescriptions Last Dose Informant Patient Reported? Taking?    amLODIPine (NORVASC) 10 mg tablet  Self No Yes   Sig: Take 1 tablet (10 mg total) by mouth daily   ergocalciferol (VITAMIN D2) 50,000 units   No No   Sig: TAKE ONE CAPSULE BY MOUTH ONE TIME PER WEEK   escitalopram (LEXAPRO) 10 mg tablet   No No   Sig: Take 1 tablet (10 mg total) by mouth daily   losartan (COZAAR) 100 MG tablet   No Yes   Sig: TAKE 1 TABLET BY MOUTH EVERY DAY      Facility-Administered Medications: None       Past Medical History:   Diagnosis Date    Dyspnea on exertion     last assessed: 3/11/2016    Hypertension     Major depressive disorder, single episode, moderate ([facility] [address] [facility] ) 12/19/2016       Past Surgical History:   Procedure Laterality Date    FIBULA FRACTURE SURGERY      HERNIA REPAIR      TONSILLECTOMY Family History   Problem Relation Age of Onset    Leukemia Mother         acute myeloid    Hypertension Mother     Multiple myeloma Mother     Diabetes Father         mellitus    Hypertension Father     Pneumonia Father         NSIP (nonspecific interstital pneumonia)     I have reviewed and agree with the history as documented  Social History     Tobacco Use    Smoking status: Never Smoker    Smokeless tobacco: Never Used   Substance Use Topics    Alcohol use: Not Currently     Comment: no alchol use (as per allscripts)    Drug use: No        Review of Systems   Constitutional: Negative for chills, fatigue and fever  Respiratory: Negative for shortness of breath  Cardiovascular: Negative for chest pain  Gastrointestinal: Negative for abdominal pain and vomiting  Musculoskeletal: Positive for arthralgias and joint swelling  Skin: Negative for rash  Neurological: Negative for numbness  Psychiatric/Behavioral: Negative for confusion  All other systems reviewed and are negative  Physical Exam  Physical Exam   Constitutional: He appears well-developed and well-nourished  No distress  Non-toxic appearing   HENT:   Head: Normocephalic and atraumatic  Right Ear: External ear normal    Left Ear: External ear normal    Eyes: Right eye exhibits no discharge  Left eye exhibits no discharge  No scleral icterus  Cardiovascular: Normal rate, regular rhythm and normal heart sounds  No murmur heard  Pulmonary/Chest: Effort normal and breath sounds normal  No stridor  No respiratory distress  He has no wheezes  Musculoskeletal: Normal range of motion  He exhibits no deformity  Feet:    Neurological: He is alert  He is not disoriented  No sensory deficit  GCS eye subscore is 4  GCS verbal subscore is 5  GCS motor subscore is 6  Skin: Skin is warm and dry  He is not diaphoretic  Psychiatric: He has a normal mood and affect   His behavior is normal    Nursing note and vitals reviewed  Vital Signs  ED Triage Vitals   Temperature Pulse Respirations Blood Pressure SpO2   10/23/19 1703 10/23/19 1702 10/23/19 1702 10/23/19 1702 10/23/19 1702   98 8 °F (37 1 °C) 82 18 149/87 98 %      Temp Source Heart Rate Source Patient Position - Orthostatic VS BP Location FiO2 (%)   10/23/19 1703 10/23/19 1702 10/23/19 1702 10/23/19 1702 --   Oral Monitor Sitting Left arm       Pain Score       --                  Vitals:    10/23/19 1702   BP: 149/87   Pulse: 82   Patient Position - Orthostatic VS: Sitting         Visual Acuity      ED Medications  Medications   predniSONE tablet 50 mg (has no administration in time range)       Diagnostic Studies  Results Reviewed     None                 No orders to display              Procedures  Procedures       ED Course             MDM  Number of Diagnoses or Management Options  Gouty arthritis of left great toe: new and does not require workup  Diagnosis management comments: 48yo male presenting for evaluation of left big toe pain and swelling without trauma  Patient was treated here for similar symptoms 6 months ago  He was diagnosed with gouty arthritis and given indomethacin with improvement in symptoms  Suspect gout given location  No signs of infection on exam  Patient's labs reviewed and last GFR was 57 in August  Will avoid NSAIDs and treat with a course of steroids  First dose of prednisone given in ED  Advised PCP follow-up  ED return precautions discussed  Patient discharged in stable condition          Amount and/or Complexity of Data Reviewed  Clinical lab tests: reviewed  Review and summarize past medical records: yes    Risk of Complications, Morbidity, and/or Mortality  Presenting problems: low  Diagnostic procedures: low  Management options: low    Patient Progress  Patient progress: stable      Disposition  Final diagnoses:   Gouty arthritis of left great toe     Time reflects when diagnosis was documented in both MDM as applicable and the Disposition within this note     Time User Action Codes Description Comment    10/23/2019  5:32  [address] [place] [M10 9] Gouty arthritis of left great toe       ED Disposition     ED Disposition Condition Date/Time Comment    Discharge Stable Wed Oct 23, 2019  5:32 PM Divina Santo discharge to home/self care  Follow-up Information     Follow up With Specialties Details Why Contact Info Additional Information    Eleazar Nuno MD Internal Medicine Schedule an appointment as soon as possible for a visit   [facility] [address] [facility] [address]       [address] [place] [address] Emergency Department Emergency Medicine  If symptoms worsen, fevers [address] [facility] [id number]  [phone] [place] ED, [address] [facility], [address] [place] [address], [id number]          Patient's Medications   Discharge Prescriptions    PREDNISONE 10 MG TABLET    Take 4 tablets (40 mg total) by mouth daily for 3 days, THEN 2 tablets (20 mg total) daily for 3 days, THEN 1 tablet (10 mg total) daily for 3 days  Start Date: 10/23/2019End Date: 11/1/2019       Order Dose: --       Quantity: 21 tablet    Refills: 0     No discharge procedures on file      ED Provider  Electronically Signed by           Sandra Guardado PA-C  10/23/19 2036

## 2019-10-26 ENCOUNTER — APPOINTMENT (OUTPATIENT)
Dept: LAB | Facility: CLINIC | Age: 54
End: 2019-10-26
Payer: COMMERCIAL

## 2019-10-26 DIAGNOSIS — I10 BENIGN HYPERTENSION: ICD-10-CM

## 2019-10-26 LAB
ANION GAP SERPL CALCULATED.3IONS-SCNC: 11 MMOL/L (ref 4–13)
BUN SERPL-MCNC: 18 MG/DL (ref 5–25)
CALCIUM SERPL-MCNC: 9.6 MG/DL (ref 8.3–10.1)
CHLORIDE SERPL-SCNC: 105 MMOL/L (ref 100–108)
CO2 SERPL-SCNC: 25 MMOL/L (ref 21–32)
CREAT SERPL-MCNC: 1.4 MG/DL (ref 0.6–1.3)
GFR SERPL CREATININE-BSD FRML MDRD: 65 ML/MIN/1.73SQ M
GLUCOSE P FAST SERPL-MCNC: 74 MG/DL (ref 65–99)
POTASSIUM SERPL-SCNC: 3.8 MMOL/L (ref 3.5–5.3)
SODIUM SERPL-SCNC: 141 MMOL/L (ref 136–145)

## 2019-10-26 PROCEDURE — 36415 COLL VENOUS BLD VENIPUNCTURE: CPT

## 2019-10-26 PROCEDURE — 80048 BASIC METABOLIC PNL TOTAL CA: CPT

## 2019-10-30 ENCOUNTER — OFFICE VISIT (OUTPATIENT)
Dept: INTERNAL MEDICINE CLINIC | Facility: CLINIC | Age: 54
End: 2019-10-30
Payer: COMMERCIAL

## 2019-10-30 VITALS
HEIGHT: 68 IN | DIASTOLIC BLOOD PRESSURE: 84 MMHG | BODY MASS INDEX: 33.34 KG/M2 | HEART RATE: 92 BPM | SYSTOLIC BLOOD PRESSURE: 120 MMHG | OXYGEN SATURATION: 97 % | WEIGHT: 220 LBS | RESPIRATION RATE: 18 BRPM

## 2019-10-30 DIAGNOSIS — I10 BENIGN HYPERTENSION: Primary | ICD-10-CM

## 2019-10-30 DIAGNOSIS — E55.9 VITAMIN D DEFICIENCY: ICD-10-CM

## 2019-10-30 DIAGNOSIS — E78.5 ELEVATED LIPIDS: ICD-10-CM

## 2019-10-30 DIAGNOSIS — M10.9 GOUTY ARTHRITIS OF RIGHT GREAT TOE: ICD-10-CM

## 2019-10-30 DIAGNOSIS — Z23 NEED FOR VACCINATION: ICD-10-CM

## 2019-10-30 PROCEDURE — 3074F SYST BP LT 130 MM HG: CPT | Performed by: PHYSICIAN ASSISTANT

## 2019-10-30 PROCEDURE — 99214 OFFICE O/P EST MOD 30 MIN: CPT | Performed by: PHYSICIAN ASSISTANT

## 2019-10-30 PROCEDURE — 3008F BODY MASS INDEX DOCD: CPT | Performed by: PHYSICIAN ASSISTANT

## 2019-10-30 PROCEDURE — 90682 RIV4 VACC RECOMBINANT DNA IM: CPT | Performed by: PHYSICIAN ASSISTANT

## 2019-10-30 PROCEDURE — 3079F DIAST BP 80-89 MM HG: CPT | Performed by: PHYSICIAN ASSISTANT

## 2019-10-30 PROCEDURE — 90471 IMMUNIZATION ADMIN: CPT | Performed by: PHYSICIAN ASSISTANT

## 2019-10-30 NOTE — PROGRESS NOTES
Assessment/Plan:      Quality Measures:       Return in about 4 months (around 2/29/2020) for Next scheduled follow up  Diagnoses and all orders for this visit:    Benign hypertension  -     Comprehensive metabolic panel; Future    Vitamin D deficiency  -     Vitamin D 25 hydroxy; Future    Elevated lipids  -     Lipid panel; Future    Gouty arthritis of right great toe  -     Uric acid; Future    Need for vaccination  -     influenza vaccine, quadrivalent, recombinant, PF, 0 5 mL          Subjective:      Patient ID: John Segovia is a 47 y o  male  Follow-up    Hypertension:  Better controlled this visit  On amlodipine and losartan  Sidra cp, palp, sob, edema, HA, dizziness, diaphoresis, syncope, visual disturbance  Depression:  Since last visit after we had talked he decided not to take the anti depressant medication prescribed  What he did was start exercise on a regular basis, changed his outlook, reduced his weight and overall started feeling much better  At this time he is going to continue his lifestyle modifications because they have helped  Recent acute gout attack of right great toe  He is finishing a course of prednisone and states the toe feels well  We discussed gout  I have discussed with him that he has recurrent attacks we may want to consider a urocosuric agent  Vitamin-D deficiency:  He is taking his vitamin-D on a regular basis  No complaint of muscle aches or twitches  Will check level with next visit  Hyperlipidemia:  Not on any medication at this time, previous labs showed good results  Will recheck with next visit  Review labs show improvement in renal functions  No other focal issues at this time        ALLERGIES:  No Known Allergies    CURRENT MEDICATIONS:    Current Outpatient Medications:     amLODIPine (NORVASC) 10 mg tablet, Take 1 tablet (10 mg total) by mouth daily, Disp: 90 tablet, Rfl: 1    ergocalciferol (VITAMIN D2) 50,000 units, TAKE ONE CAPSULE BY MOUTH ONE TIME PER WEEK, Disp: 12 capsule, Rfl: 1    losartan (COZAAR) 100 MG tablet, TAKE 1 TABLET BY MOUTH EVERY DAY, Disp: 90 tablet, Rfl: 1    predniSONE 10 mg tablet, Take 4 tablets (40 mg total) by mouth daily for 3 days, THEN 2 tablets (20 mg total) daily for 3 days, THEN 1 tablet (10 mg total) daily for 3 days  , Disp: 21 tablet, Rfl: 0    ACTIVE PROBLEM LIST:  Patient Active Problem List   Diagnosis    Benign hypertension    Elevated lipids    LVH (left ventricular hypertrophy)    Major depressive disorder, single episode, moderate (HCC)    Obesity    Vitamin D deficiency    Chronic pain of right knee    Sleeping difficulty    Gouty arthritis of right great toe    Left foot pain       PAST MEDICAL HISTORY:  Past Medical History:   Diagnosis Date    Dyspnea on exertion     last assessed: 3/11/2016    Hypertension     [facility]       PAST SURGICAL HISTORY:  Past Surgical History:   Procedure Laterality Date    FIBULA FRACTURE SURGERY      HERNIA REPAIR      TONSILLECTOMY         FAMILY HISTORY:  Family History   Problem Relation Age of Onset    Leukemia Mother         acute myeloid    Hypertension Mother     Multiple myeloma Mother     Diabetes Father         mellitus    Hypertension Father     Pneumonia Father         NSIP (nonspecific interstital pneumonia)       SOCIAL HISTORY:  Social History     Socioeconomic History    Marital status: Legally      Spouse name: Not on file    Number of children: 0    Years of education: Not on file    Highest education level: Not on file   Occupational History    Occupation:  for [employer]     Comment: full-time employment   Social Needs    Financial resource strain: Not on file    Food insecurity:     Worry: Not on file     Inability: Not on file    Transportation needs:     Medical: Not on file     Non-medical: Not on file   Tobacco Use    Smoking status: Never Smoker    Smokeless tobacco: Never Used   Substance and Sexual Activity    Alcohol use: Not Currently     Comment: no alchol use (as per allscripts)    Drug use: No    Sexual activity: Not on file   Lifestyle    Physical activity:     Days per week: Not on file     Minutes per session: Not on file    Stress: Not on file   Relationships    Social connections:     Talks on phone: Not on file     Gets together: Not on file     Attends [employer] service: Not on file     Active member of club or organization: Not on file     Attends meetings of clubs or organizations: Not on file     Relationship status: Not on file    Intimate partner violence:     Fear of current or ex partner: Not on file     Emotionally abused: Not on file     Physically abused: Not on file     Forced sexual activity: Not on file   Other Topics Concern    Not on file   Social History Narrative    Brushes teeth twice a day    Dental care, regularly    Exercise: walking           Review of Systems   Constitutional: Negative for activity change, chills, fatigue and fever  HENT: Negative for congestion  Eyes: Negative for discharge  Respiratory: Negative for cough, chest tightness and shortness of breath  Cardiovascular: Negative for chest pain, palpitations and leg swelling  Gastrointestinal: Negative for abdominal pain, blood in stool, constipation, diarrhea, nausea and vomiting  Endocrine: Negative for polydipsia, polyphagia and polyuria  Genitourinary: Negative for difficulty urinating  Musculoskeletal: Negative for arthralgias and myalgias  Skin: Negative for rash  Allergic/Immunologic: Negative for immunocompromised state  Neurological: Negative for dizziness, syncope, weakness, light-headedness and headaches  Hematological: Negative for adenopathy  Does not bruise/bleed easily  Psychiatric/Behavioral: Negative for dysphoric mood, sleep disturbance and suicidal ideas   The patient is not nervous/anxious  Objective:  Vitals:    10/30/19 1753 10/30/19 1805   BP: 130/92 120/84   BP Location: Left arm Left arm   Patient Position: Sitting Sitting   Cuff Size: Large    Pulse: 92    Resp: 18    SpO2: 97%    Weight: 99 8 kg (220 lb)    Height: 5' 8" (1 727 m)      Body mass index is 33 45 kg/m²  Physical Exam   Constitutional: He is oriented to person, place, and time  He appears well-developed and well-nourished  No distress  HENT:   Head: Normocephalic  Eyes: Pupils are equal, round, and reactive to light  Neck: Neck supple  Cardiovascular: Normal rate, regular rhythm and normal heart sounds  Pulmonary/Chest: Effort normal and breath sounds normal    Musculoskeletal: He exhibits no edema  Lymphadenopathy:     He has no cervical adenopathy  Neurological: He is alert and oriented to person, place, and time  Skin: Skin is warm and dry  No rash noted  Psychiatric: He has a normal mood and affect  His behavior is normal    Nursing note and vitals reviewed  RESULTS:    Recent Results (from the past 1008 hour(s))   Basic metabolic panel    Collection Time: 10/26/19  7:34 AM   Result Value Ref Range    Sodium 141 136 - 145 mmol/L    Potassium 3 8 3 5 - 5 3 mmol/L    Chloride 105 100 - 108 mmol/L    CO2 25 21 - 32 mmol/L    ANION GAP 11 4 - 13 mmol/L    BUN 18 5 - 25 mg/dL    Creatinine 1 40 (H) 0 60 - 1 30 mg/dL    Glucose, Fasting 74 65 - 99 mg/dL    Calcium 9 6 8 3 - 10 1 mg/dL    eGFR 65 ml/min/1 73sq m       This note was created with voice recognition software  Phonic, grammatical and spelling errors may be present within the note as a result

## 2019-10-30 NOTE — PATIENT INSTRUCTIONS
Finish the prednisone for your acute gout attack  If this reoccurs we will have to consider alternate medication only if necessary  Meantime continue your current medications  Continue your lifestyle modifications which is definitely helping with your mood and your diet in your weight reduction  Schedule follow-up in 4 months with repeat labs at that time, sooner as needed

## 2019-11-14 ENCOUNTER — OFFICE VISIT (OUTPATIENT)
Dept: INTERNAL MEDICINE CLINIC | Facility: CLINIC | Age: 54
End: 2019-11-14
Payer: COMMERCIAL

## 2019-11-14 ENCOUNTER — APPOINTMENT (OUTPATIENT)
Dept: LAB | Facility: CLINIC | Age: 54
End: 2019-11-14
Payer: COMMERCIAL

## 2019-11-14 ENCOUNTER — TRANSCRIBE ORDERS (OUTPATIENT)
Dept: LAB | Facility: CLINIC | Age: 54
End: 2019-11-14

## 2019-11-14 VITALS
SYSTOLIC BLOOD PRESSURE: 138 MMHG | DIASTOLIC BLOOD PRESSURE: 82 MMHG | BODY MASS INDEX: 33.95 KG/M2 | OXYGEN SATURATION: 96 % | WEIGHT: 224 LBS | HEIGHT: 68 IN | HEART RATE: 88 BPM | RESPIRATION RATE: 18 BRPM

## 2019-11-14 DIAGNOSIS — M10.9 ACUTE GOUT OF LEFT FOOT, UNSPECIFIED CAUSE: ICD-10-CM

## 2019-11-14 DIAGNOSIS — M10.9 ACUTE GOUT OF LEFT FOOT, UNSPECIFIED CAUSE: Primary | ICD-10-CM

## 2019-11-14 LAB — URATE SERPL-MCNC: 5.6 MG/DL (ref 4.2–8)

## 2019-11-14 PROCEDURE — 36415 COLL VENOUS BLD VENIPUNCTURE: CPT

## 2019-11-14 PROCEDURE — 1036F TOBACCO NON-USER: CPT | Performed by: PHYSICIAN ASSISTANT

## 2019-11-14 PROCEDURE — 84550 ASSAY OF BLOOD/URIC ACID: CPT

## 2019-11-14 PROCEDURE — 99213 OFFICE O/P EST LOW 20 MIN: CPT | Performed by: PHYSICIAN ASSISTANT

## 2019-11-14 RX ORDER — METHYLPREDNISOLONE 4 MG/1
TABLET ORAL
Qty: 21 TABLET | Refills: 0 | Status: SHIPPED | OUTPATIENT
Start: 2019-11-14 | End: 2019-12-02 | Stop reason: SDUPTHER

## 2019-11-14 RX ORDER — ALLOPURINOL 100 MG/1
100 TABLET ORAL DAILY
Qty: 30 TABLET | Refills: 5 | Status: SHIPPED | OUTPATIENT
Start: 2019-11-14 | End: 2019-12-02 | Stop reason: SDUPTHER

## 2019-11-14 NOTE — PATIENT INSTRUCTIONS
Start Medrol pack and finish  Can use topical ice packs for comfort intermittently today and tomorrow  Start allopurinol 1 week from today and take daily  This medicine is to try to prevent further gout attacks  Follow-up as scheduled, keep me informed if any other problems

## 2019-11-14 NOTE — LETTER
November 14, 2019     Patient: John Segovia   YOB: 1965   Date of Visit: 11/14/2019       To Whom it May Concern:    Evie Chacko is under my professional care  He was seen in my office on 11/14/2019  He may return to work on 11/18/19  Please excuse Lacey from work 11/14/19 through 11/17/19 due to an acute condition  If you have any questions or concerns, please don't hesitate to call           Sincerely,          Sohail Jaeger PA-C        CC: No Recipients

## 2019-11-14 NOTE — PROGRESS NOTES
Assessment/Plan:      Patient Instructions   Start Medrol pack and finish  Can use topical ice packs for comfort intermittently today and tomorrow  Start allopurinol 1 week from today and take daily  This medicine is to try to prevent further gout attacks  Follow-up as scheduled, keep me informed if any other problems  Quality Measures:       Return for Next scheduled follow up  Diagnoses and all orders for this visit:    Acute gout of left foot, unspecified cause  -     Uric acid; Future  -     methylPREDNISolone 4 MG tablet therapy pack; Use as directed on package  -     allopurinol (ZYLOPRIM) 100 mg tablet; Take 1 tablet (100 mg total) by mouth daily          Subjective:      Patient ID: Flip Riley is a 47 y o  male  Acute visit    Pain swelling left foot at great toe  Patient convince he has another acute gout attack  Difficulty putting on a shoe and walking  Using a crutch to help him ambulate  Area is throbbing  This is now his 3rd attack this year  Reviewed gout triggers  Does not seem as if patient is eating a lot of foods that would predispose him to gout attacks  Patient called out of work and alerted me that his supervisor has suggested since he has been missing intermittent days at work that he consider short-term disability  I have discussed with him that this is not necessary from a medical point of view        ALLERGIES:  No Known Allergies    CURRENT MEDICATIONS:    Current Outpatient Medications:     amLODIPine (NORVASC) 10 mg tablet, Take 1 tablet (10 mg total) by mouth daily, Disp: 90 tablet, Rfl: 1    ergocalciferol (VITAMIN D2) 50,000 units, TAKE ONE CAPSULE BY MOUTH ONE TIME PER WEEK, Disp: 12 capsule, Rfl: 1    losartan (COZAAR) 100 MG tablet, TAKE 1 TABLET BY MOUTH EVERY DAY, Disp: 90 tablet, Rfl: 1    allopurinol (ZYLOPRIM) 100 mg tablet, Take 1 tablet (100 mg total) by mouth daily, Disp: 30 tablet, Rfl: 5    methylPREDNISolone 4 MG tablet therapy pack, Use as directed on package, Disp: 21 tablet, Rfl: 0    ACTIVE PROBLEM LIST:  Patient Active Problem List   Diagnosis    Benign hypertension    Elevated lipids    LVH (left ventricular hypertrophy)    Major depressive disorder, single episode, moderate (HCC)    Obesity    Vitamin D deficiency    Chronic pain of right knee    Sleeping difficulty    Gouty arthritis of right great toe    Left foot pain    Acute gout of left foot       PAST MEDICAL HISTORY:  Past Medical History:   Diagnosis Date    Dyspnea on exertion     last assessed: 3/11/2016    Hypertension     Major depressive disorder, single episode, moderate ([address] ) 12/19/2016       PAST SURGICAL HISTORY:  Past Surgical History:   Procedure Laterality Date    FIBULA FRACTURE SURGERY      HERNIA REPAIR      TONSILLECTOMY         FAMILY HISTORY:  Family History   Problem Relation Age of Onset    Leukemia Mother         acute myeloid    Hypertension Mother     Multiple myeloma Mother     Diabetes Father         mellitus    Hypertension Father     Pneumonia Father         NSIP (nonspecific interstital pneumonia)       SOCIAL HISTORY:  Social History     Socioeconomic History    Marital status: Legally      Spouse name: Not on file    Number of children: 0    Years of education: Not on file    Highest education level: Not on file   Occupational History    Occupation:  for [employer]     Comment: full-time employment   Social Needs    Financial resource strain: Not on file    Food insecurity:     Worry: Not on file     Inability: Not on file    Transportation needs:     Medical: Not on file     Non-medical: Not on file   Tobacco Use    Smoking status: Never Smoker    Smokeless tobacco: Never Used   Substance and Sexual Activity    Alcohol use: Not Currently     Comment: no alchol use (as per allscripts)    Drug use: No    Sexual activity: Not on file   Lifestyle    Physical activity:     Days per week: Not on file Minutes per session: Not on file    Stress: Not on file   Relationships    Social connections:     Talks on phone: Not on file     Gets together: Not on file     Attends Gnosticist service: Not on file     Active member of club or organization: Not on file     Attends meetings of clubs or organizations: Not on file     Relationship status: Not on file    Intimate partner violence:     Fear of current or ex partner: Not on file     Emotionally abused: Not on file     Physically abused: Not on file     Forced sexual activity: Not on file   Other Topics Concern    Not on file   Social History Narrative    Brushes teeth twice a day    Dental care, regularly    Exercise: walking           Review of Systems   Constitutional: Negative for activity change, chills, fatigue and fever  HENT: Negative for congestion  Eyes: Negative for discharge  Respiratory: Negative for cough, chest tightness and shortness of breath  Cardiovascular: Negative for chest pain, palpitations and leg swelling  Gastrointestinal: Negative for abdominal pain  Endocrine: Negative for polydipsia, polyphagia and polyuria  Genitourinary: Negative for difficulty urinating  Musculoskeletal: Positive for arthralgias and gait problem (Secondary to gout pain)  Negative for myalgias  As per HPI   Skin: Negative for rash  Allergic/Immunologic: Negative for immunocompromised state  Neurological: Negative for dizziness, syncope, weakness, light-headedness and headaches  Hematological: Negative for adenopathy  Does not bruise/bleed easily  Psychiatric/Behavioral: Negative for dysphoric mood, sleep disturbance and suicidal ideas  The patient is not nervous/anxious  Objective:  Vitals:    11/14/19 0912   BP: 138/82   BP Location: Left arm   Patient Position: Sitting   Cuff Size: Large   Pulse: 88   Resp: 18   SpO2: 96%   Weight: 102 kg (224 lb)   Height: 5' 8" (1 727 m)     Body mass index is 34 06 kg/m²       Physical Exam   Constitutional: He is oriented to person, place, and time  He appears well-developed and well-nourished  Cardiovascular: Normal rate, regular rhythm and normal heart sounds  Pulmonary/Chest: Effort normal and breath sounds normal    Abdominal: Soft  There is no tenderness  Musculoskeletal: He exhibits no edema  Moderate swelling with increased heat and acute tenderness of the left metatarsal phalangeal joint  Tenderness on any range of motion  No neurovascular compromise  Neurological: He is alert and oriented to person, place, and time  Skin: Skin is warm and dry  No rash noted  Psychiatric: He has a normal mood and affect  His behavior is normal    Nursing note and vitals reviewed  RESULTS:    Recent Results (from the past 1008 hour(s))   Basic metabolic panel    Collection Time: 10/26/19  7:34 AM   Result Value Ref Range    Sodium 141 136 - 145 mmol/L    Potassium 3 8 3 5 - 5 3 mmol/L    Chloride 105 100 - 108 mmol/L    CO2 25 21 - 32 mmol/L    ANION GAP 11 4 - 13 mmol/L    BUN 18 5 - 25 mg/dL    Creatinine 1 40 (H) 0 60 - 1 30 mg/dL    Glucose, Fasting 74 65 - 99 mg/dL    Calcium 9 6 8 3 - 10 1 mg/dL    eGFR 65 ml/min/1 73sq m       This note was created with voice recognition software  Phonic, grammatical and spelling errors may be present within the note as a result

## 2019-12-02 ENCOUNTER — OFFICE VISIT (OUTPATIENT)
Dept: INTERNAL MEDICINE CLINIC | Facility: CLINIC | Age: 54
End: 2019-12-02
Payer: COMMERCIAL

## 2019-12-02 VITALS
OXYGEN SATURATION: 95 % | DIASTOLIC BLOOD PRESSURE: 76 MMHG | WEIGHT: 224 LBS | BODY MASS INDEX: 33.95 KG/M2 | HEIGHT: 68 IN | SYSTOLIC BLOOD PRESSURE: 132 MMHG | HEART RATE: 94 BPM

## 2019-12-02 DIAGNOSIS — F32.1 MAJOR DEPRESSIVE DISORDER, SINGLE EPISODE, MODERATE (HCC): ICD-10-CM

## 2019-12-02 DIAGNOSIS — M10.9 GOUTY ARTHRITIS OF RIGHT GREAT TOE: Primary | ICD-10-CM

## 2019-12-02 DIAGNOSIS — Z11.4 SCREENING FOR HIV (HUMAN IMMUNODEFICIENCY VIRUS): ICD-10-CM

## 2019-12-02 PROCEDURE — 99214 OFFICE O/P EST MOD 30 MIN: CPT | Performed by: PHYSICIAN ASSISTANT

## 2019-12-02 RX ORDER — METHYLPREDNISOLONE 4 MG/1
TABLET ORAL
Qty: 21 TABLET | Refills: 0 | Status: SHIPPED | OUTPATIENT
Start: 2019-12-02 | End: 2019-12-31 | Stop reason: ALTCHOICE

## 2019-12-02 RX ORDER — ALLOPURINOL 100 MG/1
200 TABLET ORAL DAILY
Qty: 60 TABLET | Refills: 5 | Status: SHIPPED | OUTPATIENT
Start: 2019-12-02 | End: 2020-08-10 | Stop reason: ALTCHOICE

## 2019-12-02 RX ORDER — ESCITALOPRAM OXALATE 10 MG/1
10 TABLET ORAL DAILY
Qty: 30 TABLET | Refills: 5 | Status: SHIPPED | OUTPATIENT
Start: 2019-12-02 | End: 2020-08-10 | Stop reason: SDUPTHER

## 2019-12-02 NOTE — PATIENT INSTRUCTIONS
For acute gout attack, will re-treat with Medrol pack  Will also have patient increase his allopurinol to 200 mg daily  Patient also to abstain from alcohol, and try to reduce those foods on the list that I have given him  Will also start as citalopram 10 mg daily due to ongoing severe depression  Will keep patient out of work until January 2nd  Patient is to keep up with his counseling, and bring me information regarding this to next visit in 2 weeks

## 2019-12-02 NOTE — PROGRESS NOTES
Assessment/Plan:   Patient Instructions   For acute gout attack, will re-treat with Medrol pack  Will also have patient increase his allopurinol to 200 mg daily  Patient also to abstain from alcohol, and try to reduce those foods on the list that I have given him  Will also start as citalopram 10 mg daily due to ongoing severe depression  Will keep patient out of work until January 2nd  Patient is to keep up with his counseling, and bring me information regarding this to next visit in 2 weeks  Total time spent was greater than 25 minutes face to face of which greater than 50 percent was  for counseling and coordination of care     Quality Measures:       Return in about 2 weeks (around 12/16/2019) for Next scheduled follow up  Diagnoses and all orders for this visit:    Gouty arthritis of right great toe  -     methylPREDNISolone 4 MG tablet therapy pack; Use as directed on package  -     allopurinol (ZYLOPRIM) 100 mg tablet; Take 2 tablets (200 mg total) by mouth daily    Major depressive disorder, single episode, moderate (HCC)  -     escitalopram (LEXAPRO) 10 mg tablet; Take 1 tablet (10 mg total) by mouth daily    Screening for HIV (human immunodeficiency virus)  -     HIV 1/2 AG-AB combo; Future          Subjective:      Patient ID: Lesly Falcon is a 47 y o  male  Acute visit    Patient initially presented due to swelling and pain of his right foot  This patient has a history of gout  Over the past several months he has had several appointments due to intermittent flare ups of his gout in either 1 foot or the other  Long discussion held with the patient about this and his diet  Patient admits that he has been consuming significant amount of alcohol recently in what he is stating is self medicating due to depressive symptoms      Depression:  Patient feels he is significantly depressed due to unhappiness at his job, going through a divorce which is causing financial difficulties, and having spiritual difficulties in that he is a  and feeling the pressure of others with their needs and him feeling not able to appropriately administer that type of care  He notes that he is not eating properly and is starting to gain weight again  He is having difficulty getting out of bed, no motivation, wanting to be isolative, having difficulty concentrating and focusing on his work  He has no suicidal thoughts  Patient understands that he is not being effective at his job  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 for HIV testing  He is in agreement        ALLERGIES:  No Known Allergies    CURRENT MEDICATIONS:    Current Outpatient Medications:     allopurinol (ZYLOPRIM) 100 mg tablet, Take 2 tablets (200 mg total) by mouth daily, Disp: 60 tablet, Rfl: 5    amLODIPine (NORVASC) 10 mg tablet, Take 1 tablet (10 mg total) by mouth daily, Disp: 90 tablet, Rfl: 1    ergocalciferol (VITAMIN D2) 50,000 units, TAKE ONE CAPSULE BY MOUTH ONE TIME PER WEEK, Disp: 12 capsule, Rfl: 1    losartan (COZAAR) 100 MG tablet, TAKE 1 TABLET BY MOUTH EVERY DAY, Disp: 90 tablet, Rfl: 1    escitalopram (LEXAPRO) 10 mg tablet, Take 1 tablet (10 mg total) by mouth daily, Disp: 30 tablet, Rfl: 5    methylPREDNISolone 4 MG tablet therapy pack, Use as directed on package, Disp: 21 tablet, Rfl: 0    ACTIVE PROBLEM LIST:  Patient Active Problem List   Diagnosis    Benign hypertension    Elevated lipids    LVH (left ventricular hypertrophy)    Major depressive disorder, single episode, moderate (HCC)    Obesity    Vitamin D deficiency    Chronic pain of right knee    Sleeping difficulty    Gouty arthritis of right great toe    Left foot pain    Acute gout of left foot       PAST MEDICAL HISTORY:  Past Medical History:   Diagnosis Date    Dyspnea on exertion     last assessed: 3/11/2016    Hypertension     Major depressive disorder, single episode, moderate ([facility] [address] ) 12/19/2016       PAST SURGICAL HISTORY:  Past Surgical History:   Procedure Laterality Date    FIBULA FRACTURE SURGERY      HERNIA REPAIR      TONSILLECTOMY         FAMILY HISTORY:  Family History   Problem Relation Age of Onset    Leukemia Mother         acute myeloid    Hypertension Mother     Multiple myeloma Mother     Diabetes Father         mellitus    Hypertension Father     Pneumonia Father         NSIP (nonspecific interstital pneumonia)       SOCIAL HISTORY:  Social History     Socioeconomic History    Marital status: Legally      Spouse name: Not on file    Number of children: 0    Years of education: Not on file    Highest education level: Not on file   Occupational History    Occupation:  for [employer]     Comment: full-time employment   Social Needs    Financial resource strain: Not on file    Food insecurity:     Worry: Not on file     Inability: Not on file    Transportation needs:     Medical: Not on file     Non-medical: Not on file   Tobacco Use    Smoking status: Never Smoker    Smokeless tobacco: Never Used   Substance and Sexual Activity    Alcohol use: Not Currently     Comment: no alchol use (as per allscripts)    Drug use: No    Sexual activity: Not on file   Lifestyle    Physical activity:     Days per week: Not on file     Minutes per session: Not on file    Stress: Not on file   Relationships    Social connections:     Talks on phone: Not on file     Gets together: Not on file     Attends Protestant service: Not on file     Active member of club or organization: Not on file     Attends meetings of clubs or organizations: Not on file     Relationship status: Not on file    Intimate partner violence:     Fear of current or ex partner: Not on file     Emotionally abused: Not on file     Physically abused: Not on file     Forced sexual activity: Not on file   Other Topics Concern    Not on file   Social History Narrative    Brushes teeth twice a day    Dental care, regularly    Exercise: walking           Review of Systems   Constitutional: Positive for fatigue  Negative for activity change, chills and fever  HENT: Negative for congestion  Eyes: Negative for discharge  Respiratory: Negative for cough, chest tightness and shortness of breath  Cardiovascular: Negative for chest pain, palpitations and leg swelling  Gastrointestinal: Negative for abdominal pain  Genitourinary: Negative for difficulty urinating  Musculoskeletal: Positive for arthralgias and gait problem (Secondary to foot pain)  Negative for myalgias  Skin: Negative for rash  Allergic/Immunologic: Negative for immunocompromised state  Neurological: Negative for dizziness, syncope, weakness, light-headedness and headaches  Hematological: Negative for adenopathy  Does not bruise/bleed easily  Psychiatric/Behavioral: Positive for decreased concentration, dysphoric mood and sleep disturbance  Negative for suicidal ideas  The patient is not nervous/anxious  Objective:  Vitals:    12/02/19 1140   BP: 132/76   BP Location: Left arm   Patient Position: Sitting   Cuff Size: Standard   Pulse: 94   SpO2: 95%   Weight: 102 kg (224 lb)   Height: 5' 8" (1 727 m)     Body mass index is 34 06 kg/m²  Physical Exam   Constitutional: He is oriented to person, place, and time  He appears well-developed and well-nourished  Obese   Neck: Neck supple  No JVD present  Carotid bruit is not present  No thyromegaly present  Cardiovascular: Normal rate, regular rhythm and normal heart sounds  Pulmonary/Chest: Effort normal and breath sounds normal    Abdominal: Soft  There is no tenderness  Obese   Musculoskeletal: He exhibits no edema  Swelling, dusky erythema, and acute pain of right 1st metatarsal phalangeal joint  Tenderness on any attempted range of motion  No neurovascular compromise  Lymphadenopathy:     He has no cervical adenopathy  Neurological: He is alert and oriented to person, place, and time  Skin: Skin is warm and dry  No rash noted  Psychiatric: His speech is normal and behavior is normal  Judgment and thought content normal  Cognition and memory are normal  He exhibits a depressed mood  Nursing note and vitals reviewed  RESULTS:        This note was created with voice recognition software  Phonic, grammatical and spelling errors may be present within the note as a result

## 2019-12-02 NOTE — LETTER
December 2, 2019     Patient: Shae Lazaro   YOB: 1965   Date of Visit: 12/2/2019       To Whom it May Concern:    Edin Ga is under my professional care  He was seen in my office on 12/2/2019  He may return to work on 1/2/2020  Please excuse Britney Lyons from work, 12/02/2019 through 01/01/2020 due to ongoing severe depression and other medical issues  If you have any questions or concerns, please don't hesitate to call           Sincerely,          Nancy Saucedo PA-C        CC: No Recipients

## 2019-12-17 ENCOUNTER — OFFICE VISIT (OUTPATIENT)
Dept: INTERNAL MEDICINE CLINIC | Facility: CLINIC | Age: 54
End: 2019-12-17
Payer: COMMERCIAL

## 2019-12-17 VITALS
HEART RATE: 88 BPM | SYSTOLIC BLOOD PRESSURE: 130 MMHG | RESPIRATION RATE: 18 BRPM | OXYGEN SATURATION: 96 % | WEIGHT: 224 LBS | DIASTOLIC BLOOD PRESSURE: 88 MMHG | BODY MASS INDEX: 33.95 KG/M2 | HEIGHT: 68 IN

## 2019-12-17 DIAGNOSIS — F32.1 MAJOR DEPRESSIVE DISORDER, SINGLE EPISODE, MODERATE (HCC): Primary | ICD-10-CM

## 2019-12-17 DIAGNOSIS — M20.12 HALLUX VALGUS (ACQUIRED), LEFT FOOT: ICD-10-CM

## 2019-12-17 DIAGNOSIS — I10 BENIGN HYPERTENSION: ICD-10-CM

## 2019-12-17 DIAGNOSIS — M20.11 HALLUX VALGUS (ACQUIRED), RIGHT FOOT: ICD-10-CM

## 2019-12-17 PROCEDURE — 3075F SYST BP GE 130 - 139MM HG: CPT | Performed by: PHYSICIAN ASSISTANT

## 2019-12-17 PROCEDURE — 3079F DIAST BP 80-89 MM HG: CPT | Performed by: PHYSICIAN ASSISTANT

## 2019-12-17 PROCEDURE — 1036F TOBACCO NON-USER: CPT | Performed by: PHYSICIAN ASSISTANT

## 2019-12-17 PROCEDURE — 99214 OFFICE O/P EST MOD 30 MIN: CPT | Performed by: PHYSICIAN ASSISTANT

## 2019-12-17 PROCEDURE — 3008F BODY MASS INDEX DOCD: CPT | Performed by: PHYSICIAN ASSISTANT

## 2019-12-17 NOTE — PATIENT INSTRUCTIONS
No changes in current medications  Continue follow-up with counseling as you are definitely getting something out of it  Schedule follow-up here in 2 weeks, sooner as needed  Will refer to Podiatry due to ongoing foot pain and bilateral bunions (hallux valgus)

## 2019-12-17 NOTE — PROGRESS NOTES
Assessment/Plan:   Patient Instructions   No changes in current medications  Continue follow-up with counseling as you are definitely getting something out of it  Schedule follow-up here in 2 weeks, sooner as needed  Will refer to Podiatry due to ongoing foot pain and bilateral bunions (hallux valgus)  Total time spent was greater than 25 minutes face to face of which greater than 50 percent was  for counseling and coordination of care     Quality Measures:       Return in about 2 weeks (around 12/31/2019) for Next scheduled follow up  Diagnoses and all orders for this visit:    Major depressive disorder, single episode, moderate (HCC)    Benign hypertension    Hallux valgus (acquired), right foot  -     Ambulatory referral to Podiatry; Future    Hallux valgus (acquired), left foot  -     Ambulatory referral to Podiatry; Future          Subjective:      Patient ID: Aiden Todd is a 47 y o  male  Follow-up    Depression:  Has been back on the Lexapro now for 2 weeks  He thinks he is starting to feel better  He however also is in counseling and states he has been taping his counseling sessions, going back to listen to them and by doing so is getting more out of the sessions that he did by just being present  He is finding this very effective  He has also started carry out the assignments I had given him  No suicidal thoughts  Hypertension:  He did not take his medication today so far, however blood pressure decently controlled  Sidra cp, palp, sob, edema, HA, dizziness, diaphoresis, syncope, visual disturbance  Bilateral how hallux valgus  Having pain in both feet and particularly the right foot at the metatarsal phalangeal joint  We both agree Podiatry consultation would benefit the patient        ALLERGIES:  No Known Allergies    CURRENT MEDICATIONS:    Current Outpatient Medications:     allopurinol (ZYLOPRIM) 100 mg tablet, Take 2 tablets (200 mg total) by mouth daily, Disp: 60 tablet, Rfl: 5    amLODIPine (NORVASC) 10 mg tablet, Take 1 tablet (10 mg total) by mouth daily, Disp: 90 tablet, Rfl: 1    ergocalciferol (VITAMIN D2) 50,000 units, TAKE ONE CAPSULE BY MOUTH ONE TIME PER WEEK, Disp: 12 capsule, Rfl: 1    escitalopram (LEXAPRO) 10 mg tablet, Take 1 tablet (10 mg total) by mouth daily, Disp: 30 tablet, Rfl: 5    losartan (COZAAR) 100 MG tablet, TAKE 1 TABLET BY MOUTH EVERY DAY, Disp: 90 tablet, Rfl: 1    methylPREDNISolone 4 MG tablet therapy pack, Use as directed on package (Patient not taking: Reported on 12/17/2019), Disp: 21 tablet, Rfl: 0    ACTIVE PROBLEM LIST:  Patient Active Problem List   Diagnosis    Benign hypertension    Elevated lipids    LVH (left ventricular hypertrophy)    Major depressive disorder, single episode, moderate (HCC)    Obesity    Vitamin D deficiency    Chronic pain of right knee    Sleeping difficulty    Gouty arthritis of right great toe    Left foot pain    Acute gout of left foot       PAST MEDICAL HISTORY:  Past Medical History:   Diagnosis Date    Dyspnea on exertion     last assessed: 3/11/2016    Hypertension     Major depressive disorder, single episode, moderate ([facility] [address] ) 12/19/2016       PAST SURGICAL HISTORY:  Past Surgical History:   Procedure Laterality Date    FIBULA FRACTURE SURGERY      HERNIA REPAIR      TONSILLECTOMY         FAMILY HISTORY:  Family History   Problem Relation Age of Onset    Leukemia Mother         acute myeloid    Hypertension Mother     Multiple myeloma Mother     Diabetes Father         mellitus    Hypertension Father     Pneumonia Father         NSIP (nonspecific interstital pneumonia)       SOCIAL HISTORY:  Social History     Socioeconomic History    Marital status: Legally      Spouse name: Not on file    Number of children: 0    Years of education: Not on file    Highest education level: Not on file   Occupational History    Occupation:  for [employer]     Comment: full-time employment   Social Needs    Financial resource strain: Not on file    Food insecurity:     Worry: Not on file     Inability: Not on file    Transportation needs:     Medical: Not on file     Non-medical: Not on file   Tobacco Use    Smoking status: Never Smoker    Smokeless tobacco: Never Used   Substance and Sexual Activity    Alcohol use: Not Currently     Comment: no alchol use (as per allscripts)    Drug use: No    Sexual activity: Not on file   Lifestyle    Physical activity:     Days per week: Not on file     Minutes per session: Not on file    Stress: Not on file   Relationships    Social connections:     Talks on phone: Not on file     Gets together: Not on file     Attends Christianity service: Not on file     Active member of club or organization: Not on file     Attends meetings of clubs or organizations: Not on file     Relationship status: Not on file    Intimate partner violence:     Fear of current or ex partner: Not on file     Emotionally abused: Not on file     Physically abused: Not on file     Forced sexual activity: Not on file   Other Topics Concern    Not on file   Social History Narrative    Brushes teeth twice a day    Dental care, regularly    Exercise: walking           Review of Systems   Constitutional: Positive for fatigue  Negative for activity change, chills and fever  HENT: Negative for congestion  Eyes: Negative for discharge  Respiratory: Negative for cough, chest tightness and shortness of breath  Cardiovascular: Negative for chest pain, palpitations and leg swelling  Gastrointestinal: Negative for abdominal pain  Endocrine: Negative for polydipsia, polyphagia and polyuria  Genitourinary: Negative for difficulty urinating  Musculoskeletal: Negative for arthralgias and myalgias  Skin: Negative for rash  Allergic/Immunologic: Negative for immunocompromised state     Neurological: Negative for dizziness, syncope, weakness, light-headedness and headaches  Hematological: Negative for adenopathy  Does not bruise/bleed easily  Psychiatric/Behavioral: Positive for decreased concentration and dysphoric mood  Negative for agitation, confusion, sleep disturbance and suicidal ideas  The patient is not nervous/anxious  Objective:  Vitals:    12/17/19 0838 12/17/19 0849   BP: 142/96 130/88   BP Location: Left arm Left arm   Patient Position: Sitting Sitting   Cuff Size: Large    Pulse: 88    Resp: 18    SpO2: 96%    Weight: 102 kg (224 lb)    Height: 5' 8" (1 727 m)      Body mass index is 34 06 kg/m²  Physical Exam   Constitutional: He is oriented to person, place, and time  He appears well-developed and well-nourished  No distress  Neck: Neck supple  Cardiovascular: Normal rate, regular rhythm and normal heart sounds  Pulmonary/Chest: Effort normal and breath sounds normal    Musculoskeletal: He exhibits no edema  Lymphadenopathy:     He has no cervical adenopathy  Neurological: He is alert and oriented to person, place, and time  Skin: Skin is warm and dry  No rash noted  Psychiatric: He has a normal mood and affect  His behavior is normal  Judgment and thought content normal    Nursing note and vitals reviewed  RESULTS:    Recent Results (from the past 1008 hour(s))   Uric acid    Collection Time: 11/14/19  9:38 AM   Result Value Ref Range    Uric Acid 5 6 4 2 - 8 0 mg/dL       This note was created with voice recognition software  Phonic, grammatical and spelling errors may be present within the note as a result

## 2019-12-31 ENCOUNTER — OFFICE VISIT (OUTPATIENT)
Dept: INTERNAL MEDICINE CLINIC | Facility: CLINIC | Age: 54
End: 2019-12-31
Payer: COMMERCIAL

## 2019-12-31 VITALS
DIASTOLIC BLOOD PRESSURE: 86 MMHG | BODY MASS INDEX: 33.65 KG/M2 | RESPIRATION RATE: 18 BRPM | HEART RATE: 74 BPM | SYSTOLIC BLOOD PRESSURE: 136 MMHG | WEIGHT: 222 LBS | HEIGHT: 68 IN | OXYGEN SATURATION: 98 %

## 2019-12-31 DIAGNOSIS — I10 BENIGN HYPERTENSION: ICD-10-CM

## 2019-12-31 DIAGNOSIS — F32.1 MAJOR DEPRESSIVE DISORDER, SINGLE EPISODE, MODERATE (HCC): Primary | ICD-10-CM

## 2019-12-31 PROCEDURE — 99213 OFFICE O/P EST LOW 20 MIN: CPT | Performed by: PHYSICIAN ASSISTANT

## 2019-12-31 PROCEDURE — 3008F BODY MASS INDEX DOCD: CPT | Performed by: PHYSICIAN ASSISTANT

## 2019-12-31 PROCEDURE — 3075F SYST BP GE 130 - 139MM HG: CPT | Performed by: PHYSICIAN ASSISTANT

## 2019-12-31 PROCEDURE — 1036F TOBACCO NON-USER: CPT | Performed by: PHYSICIAN ASSISTANT

## 2019-12-31 PROCEDURE — 3079F DIAST BP 80-89 MM HG: CPT | Performed by: PHYSICIAN ASSISTANT

## 2019-12-31 NOTE — PATIENT INSTRUCTIONS
Will continue current medical management  Follow-up as scheduled, patient to do labs prior to that visit  Patient will return to work on January 2nd  Follow-up here sooner as needed

## 2019-12-31 NOTE — PROGRESS NOTES
Assessment/Plan:   Patient Instructions   Will continue current medical management  Follow-up as scheduled, patient to do labs prior to that visit  Patient will return to work on January 2nd  Follow-up here sooner as needed  Continue your counseling  Quality Measures:       Return for Next scheduled follow up  Diagnoses and all orders for this visit:    Major depressive disorder, single episode, moderate ([facility] [address] )    Benign hypertension          Subjective:      Patient ID: Chance Horta is a 47 y o  male  Follow-up    Depression:  Doing much better  Feels medication has been beneficial, continues with counseling and feels he is getting a great deal out of it  He has also hooked up with old college friends and has met a new woman so therefore is being more social   He does have plans for new year's Natalya  He also plans to return to work on January 2nd  He believes is outlook is much more positive  Hypertension:  Better control  Sidra cp, palp, sob, edema, HA, dizziness, diaphoresis, syncope, visual disturbance        ALLERGIES:  No Known Allergies    CURRENT MEDICATIONS:    Current Outpatient Medications:     allopurinol (ZYLOPRIM) 100 mg tablet, Take 2 tablets (200 mg total) by mouth daily, Disp: 60 tablet, Rfl: 5    amLODIPine (NORVASC) 10 mg tablet, Take 1 tablet (10 mg total) by mouth daily, Disp: 90 tablet, Rfl: 1    ergocalciferol (VITAMIN D2) 50,000 units, TAKE ONE CAPSULE BY MOUTH ONE TIME PER WEEK, Disp: 12 capsule, Rfl: 1    escitalopram (LEXAPRO) 10 mg tablet, Take 1 tablet (10 mg total) by mouth daily, Disp: 30 tablet, Rfl: 5    losartan (COZAAR) 100 MG tablet, TAKE 1 TABLET BY MOUTH EVERY DAY, Disp: 90 tablet, Rfl: 1    ACTIVE PROBLEM LIST:  Patient Active Problem List   Diagnosis    Benign hypertension    Elevated lipids    LVH (left ventricular hypertrophy)    Major depressive disorder, single episode, moderate (HCC)    Obesity    Vitamin D deficiency    Chronic pain of right knee    Sleeping difficulty    Gouty arthritis of right great toe    Left foot pain    Acute gout of left foot       PAST MEDICAL HISTORY:  Past Medical History:   Diagnosis Date    Dyspnea on exertion     last assessed: 3/11/2016    Hypertension     Major depressive disorder, single episode, moderate ([facility] [address] ) 12/19/2016       PAST SURGICAL HISTORY:  Past Surgical History:   Procedure Laterality Date    FIBULA FRACTURE SURGERY      HERNIA REPAIR      TONSILLECTOMY         FAMILY HISTORY:  Family History   Problem Relation Age of Onset    Leukemia Mother         acute myeloid    Hypertension Mother     Multiple myeloma Mother     Diabetes Father         mellitus    Hypertension Father     Pneumonia Father         NSIP (nonspecific interstital pneumonia)       SOCIAL HISTORY:  Social History     Socioeconomic History    Marital status: Legally      Spouse name: Not on file    Number of children: 0    Years of education: Not on file    Highest education level: Not on file   Occupational History    Occupation:  for [employer]     Comment: full-time employment   Social Needs    Financial resource strain: Not on file    Food insecurity:     Worry: Not on file     Inability: Not on file    Transportation needs:     Medical: Not on file     Non-medical: Not on file   Tobacco Use    Smoking status: Never Smoker    Smokeless tobacco: Never Used   Substance and Sexual Activity    Alcohol use: Not Currently     Comment: no alchol use (as per allscripts)    Drug use: No    Sexual activity: Not on file   Lifestyle    Physical activity:     Days per week: Not on file     Minutes per session: Not on file    Stress: Not on file   Relationships    Social connections:     Talks on phone: Not on file     Gets together: Not on file     Attends Congregational service: Not on file     Active member of club or organization: Not on file     Attends meetings of clubs or organizations: Not on file     Relationship status: Not on file    Intimate partner violence:     Fear of current or ex partner: Not on file     Emotionally abused: Not on file     Physically abused: Not on file     Forced sexual activity: Not on file   Other Topics Concern    Not on file   Social History Narrative    Brushes teeth twice a day    Dental care, regularly    Exercise: walking           Review of Systems   Constitutional: Negative for activity change, chills, fatigue and fever  HENT: Negative for congestion  Eyes: Negative for discharge  Respiratory: Negative for cough, chest tightness and shortness of breath  Cardiovascular: Negative for chest pain, palpitations and leg swelling  Gastrointestinal: Negative for abdominal pain  Genitourinary: Negative for difficulty urinating  Musculoskeletal: Negative for arthralgias and myalgias  Skin: Negative for rash  Allergic/Immunologic: Negative for immunocompromised state  Neurological: Negative for dizziness, syncope, weakness, light-headedness and headaches  Hematological: Negative for adenopathy  Does not bruise/bleed easily  Psychiatric/Behavioral: Negative for confusion, decreased concentration, dysphoric mood, sleep disturbance and suicidal ideas  The patient is not nervous/anxious  Objective:  Vitals:    12/31/19 0923   BP: 142/82   BP Location: Left arm   Patient Position: Sitting   Cuff Size: Large   Pulse: 74   Resp: 18   SpO2: 98%   Weight: 101 kg (222 lb)   Height: 5' 8" (1 727 m)     Body mass index is 33 75 kg/m²  Physical Exam   Constitutional: He is oriented to person, place, and time  He appears well-developed and well-nourished  No distress  Neck: Neck supple  Carotid bruit is not present  Cardiovascular: Normal rate, regular rhythm and normal heart sounds  Pulmonary/Chest: Effort normal and breath sounds normal  No respiratory distress  He exhibits no tenderness  Musculoskeletal: He exhibits no edema  Lymphadenopathy:     He has no cervical adenopathy  Neurological: He is alert and oriented to person, place, and time  Skin: Skin is warm and dry  No rash noted  Psychiatric: He has a normal mood and affect  His behavior is normal    Nursing note and vitals reviewed  RESULTS:    No results found for this or any previous visit (from the past 1008 hour(s))  This note was created with voice recognition software  Phonic, grammatical and spelling errors may be present within the note as a result

## 2020-04-15 ENCOUNTER — TELEMEDICINE (OUTPATIENT)
Dept: INTERNAL MEDICINE CLINIC | Facility: CLINIC | Age: 55
End: 2020-04-15
Payer: COMMERCIAL

## 2020-04-15 VITALS — HEIGHT: 68 IN | WEIGHT: 225 LBS | BODY MASS INDEX: 34.1 KG/M2

## 2020-04-15 DIAGNOSIS — I10 BENIGN HYPERTENSION: Primary | ICD-10-CM

## 2020-04-15 DIAGNOSIS — E55.9 VITAMIN D DEFICIENCY: ICD-10-CM

## 2020-04-15 DIAGNOSIS — F32.1 MAJOR DEPRESSIVE DISORDER, SINGLE EPISODE, MODERATE (HCC): ICD-10-CM

## 2020-04-15 PROCEDURE — 99214 OFFICE O/P EST MOD 30 MIN: CPT | Performed by: PHYSICIAN ASSISTANT

## 2020-06-19 ENCOUNTER — TELEPHONE (OUTPATIENT)
Dept: INTERNAL MEDICINE CLINIC | Facility: CLINIC | Age: 55
End: 2020-06-19

## 2020-06-19 DIAGNOSIS — J98.01 BRONCHOSPASM: Primary | ICD-10-CM

## 2020-06-19 RX ORDER — ALBUTEROL SULFATE 90 UG/1
2 AEROSOL, METERED RESPIRATORY (INHALATION) EVERY 6 HOURS PRN
Qty: 1 INHALER | Refills: 0 | Status: SHIPPED | OUTPATIENT
Start: 2020-06-19 | End: 2020-09-24 | Stop reason: SDUPTHER

## 2020-08-10 ENCOUNTER — OFFICE VISIT (OUTPATIENT)
Dept: INTERNAL MEDICINE CLINIC | Facility: CLINIC | Age: 55
End: 2020-08-10
Payer: COMMERCIAL

## 2020-08-10 VITALS
BODY MASS INDEX: 35.61 KG/M2 | HEIGHT: 68 IN | DIASTOLIC BLOOD PRESSURE: 94 MMHG | WEIGHT: 235 LBS | HEART RATE: 89 BPM | SYSTOLIC BLOOD PRESSURE: 142 MMHG | TEMPERATURE: 99.4 F | RESPIRATION RATE: 14 BRPM | OXYGEN SATURATION: 98 %

## 2020-08-10 DIAGNOSIS — Z11.4 SCREENING FOR HIV (HUMAN IMMUNODEFICIENCY VIRUS): ICD-10-CM

## 2020-08-10 DIAGNOSIS — M10.9 GOUTY ARTHRITIS OF RIGHT GREAT TOE: ICD-10-CM

## 2020-08-10 DIAGNOSIS — I10 BENIGN HYPERTENSION: ICD-10-CM

## 2020-08-10 DIAGNOSIS — E78.5 ELEVATED LIPIDS: ICD-10-CM

## 2020-08-10 DIAGNOSIS — R10.9 ABDOMINAL WALL PAIN: Primary | ICD-10-CM

## 2020-08-10 DIAGNOSIS — E55.9 VITAMIN D DEFICIENCY: ICD-10-CM

## 2020-08-10 DIAGNOSIS — F32.1 MAJOR DEPRESSIVE DISORDER, SINGLE EPISODE, MODERATE (HCC): ICD-10-CM

## 2020-08-10 PROCEDURE — 3008F BODY MASS INDEX DOCD: CPT | Performed by: PHYSICIAN ASSISTANT

## 2020-08-10 PROCEDURE — 99214 OFFICE O/P EST MOD 30 MIN: CPT | Performed by: PHYSICIAN ASSISTANT

## 2020-08-10 PROCEDURE — 3080F DIAST BP >= 90 MM HG: CPT | Performed by: PHYSICIAN ASSISTANT

## 2020-08-10 PROCEDURE — 1036F TOBACCO NON-USER: CPT | Performed by: PHYSICIAN ASSISTANT

## 2020-08-10 PROCEDURE — 3077F SYST BP >= 140 MM HG: CPT | Performed by: PHYSICIAN ASSISTANT

## 2020-08-10 RX ORDER — ESCITALOPRAM OXALATE 10 MG/1
10 TABLET ORAL DAILY
Qty: 30 TABLET | Refills: 5 | Status: SHIPPED | OUTPATIENT
Start: 2020-08-10 | End: 2021-06-09 | Stop reason: SDUPTHER

## 2020-08-10 RX ORDER — ERGOCALCIFEROL 1.25 MG/1
50000 CAPSULE ORAL WEEKLY
Qty: 12 CAPSULE | Refills: 1 | Status: SHIPPED | OUTPATIENT
Start: 2020-08-10 | End: 2021-07-07

## 2020-08-10 NOTE — LETTER
August 10, 2020     Patient: Blaise Dodd   YOB: 1965   Date of Visit: 8/10/2020       To Whom it May Concern:    Gia Chicollette is under my professional care  He was seen in my office on 8/10/2020  He may return to work on 8/11/2020  If you have any questions or concerns, please don't hesitate to call           Sincerely,          Ada Freeman PA-C        CC: No Recipients

## 2020-08-10 NOTE — PROGRESS NOTES
Assessment/Plan:   Patient Instructions   Abdominal pain clinically is muscular  This will take some time to improve  Start back on your full dose of escitalopram   Have lab work done for follow-up visit in 3 weeks  Will reassess blood pressure at that time also  Quality Measures:       Return in about 3 weeks (around 8/31/2020) for Next scheduled follow up  Diagnoses and all orders for this visit:    Abdominal wall pain    Major depressive disorder, single episode, moderate (HCC)  -     escitalopram (LEXAPRO) 10 mg tablet; Take 1 tablet (10 mg total) by mouth daily    Vitamin D deficiency  -     ergocalciferol (VITAMIN D2) 50,000 units; Take 1 capsule (50,000 Units total) by mouth once a week  -     Vitamin D 25 hydroxy; Future    Benign hypertension  -     CBC and differential; Future  -     Comprehensive metabolic panel; Future    Gouty arthritis of right great toe  -     Uric acid; Future    Elevated lipids  -     Lipid panel; Future    Screening for HIV (human immunodeficiency virus)  -     HIV 1/2 Antigen/Antibody (4th Generation) w Reflex [facility]; Future          Subjective:      Patient ID: Amy Bunn is a 54 y o  male  Acute visit    Patient states over the past couple weeks he has noted a left-sided abdominal pain  He describes it as dull, intermittent  He has had some mild nausea and vomited on 1 occasion but he is sure that this was related to something he had eaten  No change in bowel habits  No diarrhea  No melena  No blood in stool  No  symptoms  Hypertension:  Not at goal   States he has been taking his medication regularly  Sidra cp, palp, sob, edema, HA, dizziness, diaphoresis, syncope, visual disturbance  Complaining of a nervous feeling in his stomach  This is been going on for approximately a month  He states he notices in the morning when he wakes up and throughout the day  He had reduced his dose of escitalopram from 10 mg to 5 mg   He is wondering if this is a contributing factor        ALLERGIES:  No Known Allergies    CURRENT MEDICATIONS:    Current Outpatient Medications:     albuterol (Ventolin HFA) 90 mcg/act inhaler, Inhale 2 puffs every 6 (six) hours as needed for wheezing, Disp: 1 Inhaler, Rfl: 0    amLODIPine (NORVASC) 10 mg tablet, Take 1 tablet (10 mg total) by mouth daily, Disp: 90 tablet, Rfl: 1    ergocalciferol (VITAMIN D2) 50,000 units, Take 1 capsule (50,000 Units total) by mouth once a week, Disp: 12 capsule, Rfl: 1    escitalopram (LEXAPRO) 10 mg tablet, Take 1 tablet (10 mg total) by mouth daily, Disp: 30 tablet, Rfl: 5    losartan (COZAAR) 100 MG tablet, TAKE 1 TABLET BY MOUTH EVERY DAY, Disp: 90 tablet, Rfl: 1    ACTIVE PROBLEM LIST:  Patient Active Problem List   Diagnosis    Benign hypertension    Elevated lipids    LVH (left ventricular hypertrophy)    Major depressive disorder, single episode, moderate (HCC)    Obesity    Vitamin D deficiency    Chronic pain of right knee    Sleeping difficulty    Gouty arthritis of right great toe    Left foot pain    Acute gout of left foot       PAST MEDICAL HISTORY:  Past Medical History:   Diagnosis Date    Dyspnea on exertion     last assessed: 3/11/2016    Hypertension     Major depressive disorder, single episode, moderate (HCC) 12/19/2016       PAST SURGICAL HISTORY:  Past Surgical History:   Procedure Laterality Date    FIBULA FRACTURE SURGERY      HERNIA REPAIR      TONSILLECTOMY         FAMILY HISTORY:  Family History   Problem Relation Age of Onset    Leukemia Mother         acute myeloid    Hypertension Mother     Multiple myeloma Mother     Diabetes Father         mellitus    Hypertension Father     Pneumonia Father         NSIP (nonspecific interstital pneumonia)       SOCIAL HISTORY:  Social History     Socioeconomic History    Marital status: Legally      Spouse name: Not on file    Number of children: 0    Years of education: Not on file   Shanika Nelson Highest education level: Not on file   Occupational History    Occupation:  for [employer]     Comment: full-time employment   Social Needs    Financial resource strain: Not on file    Food insecurity     Worry: Not on file     Inability: Not on file   [employer] needs     Medical: Not on file     Non-medical: Not on file   Tobacco Use    Smoking status: Never Smoker    Smokeless tobacco: Never Used   Substance and Sexual Activity    Alcohol use: Not Currently     Comment: no alchol use (as per allscripts)    Drug use: No    Sexual activity: Not on file   Lifestyle    Physical activity     Days per week: Not on file     Minutes per session: Not on file    Stress: Not on file   Relationships    Social connections     Talks on phone: Not on file     Gets together: Not on file     Attends Buddhism service: Not on file     Active member of club or organization: Not on file     Attends meetings of clubs or organizations: Not on file     Relationship status: Not on file    Intimate partner violence     Fear of current or ex partner: Not on file     Emotionally abused: Not on file     Physically abused: Not on file     Forced sexual activity: Not on file   Other Topics Concern    Not on file   Social History Narrative    Brushes teeth twice a day    Dental care, regularly    Exercise: walking           Review of Systems   Constitutional: Negative for activity change, chills, fatigue and fever  HENT: Negative for congestion  Eyes: Negative for discharge  Respiratory: Negative for cough, chest tightness and shortness of breath  Cardiovascular: Negative for chest pain, palpitations and leg swelling  Gastrointestinal: Positive for abdominal pain  Negative for anal bleeding, blood in stool, constipation, diarrhea, nausea and vomiting  Endocrine: Negative for polydipsia, polyphagia and polyuria  Genitourinary: Negative for difficulty urinating, dysuria, frequency and hematuria  Musculoskeletal: Negative for arthralgias and myalgias  Skin: Negative for rash  Allergic/Immunologic: Negative for immunocompromised state  Neurological: Negative for dizziness, syncope, weakness, light-headedness and headaches  Hematological: Negative for adenopathy  Does not bruise/bleed easily  Psychiatric/Behavioral: Negative for dysphoric mood and sleep disturbance  The patient is not nervous/anxious  Objective:  Vitals:    08/10/20 1017   BP: (!) 160/112   BP Location: Left arm   Patient Position: Sitting   Cuff Size: Standard   Pulse: 89   Resp: 14   Temp: 99 4 °F (37 4 °C)   TempSrc: Tympanic   SpO2: 98%   Weight: 107 kg (235 lb)   Height: 5' 8" (1 727 m)     Body mass index is 35 73 kg/m²  Physical Exam  Vitals signs and nursing note reviewed  Constitutional:       General: He is not in acute distress  Appearance: He is well-developed  He is not ill-appearing  HENT:      Head: Normocephalic and atraumatic  Neck:      Thyroid: No thyromegaly  Vascular: No carotid bruit or JVD  Cardiovascular:      Rate and Rhythm: Normal rate and regular rhythm  Heart sounds: Normal heart sounds  Pulmonary:      Effort: Pulmonary effort is normal       Breath sounds: Normal breath sounds  Abdominal:      General: Abdomen is flat  Bowel sounds are normal  There is no distension or abdominal bruit  Palpations: Abdomen is soft  There is no hepatomegaly or splenomegaly  Tenderness: There is abdominal tenderness (Abdominal wall tenderness left side, most prominent when he tighten see his abdominal muscles  No deep palpatory tenderness  More located left mid abdomen area  )  There is no right CVA tenderness, left CVA tenderness or guarding  Hernia: A hernia is present  Hernia is present in the umbilical area  Lymphadenopathy:      Cervical: No cervical adenopathy  Skin:     General: Skin is warm and dry  Findings: No rash     Neurological:      General: No focal deficit present  Mental Status: He is alert and oriented to person, place, and time  Psychiatric:         Mood and Affect: Mood normal          Behavior: Behavior normal            RESULTS:    No results found for this or any previous visit (from the past 1008 hour(s))  This note was created with voice recognition software  Phonic, grammatical and spelling errors may be present within the note as a result

## 2020-08-10 NOTE — PATIENT INSTRUCTIONS
Abdominal pain clinically is muscular  This will take some time to improve  Start back on your full dose of escitalopram   Have lab work done for follow-up visit in 3 weeks  Will reassess blood pressure at that time also

## 2020-09-05 ENCOUNTER — APPOINTMENT (OUTPATIENT)
Dept: LAB | Facility: CLINIC | Age: 55
End: 2020-09-05
Payer: COMMERCIAL

## 2020-09-05 DIAGNOSIS — Z11.4 SCREENING FOR HIV (HUMAN IMMUNODEFICIENCY VIRUS): ICD-10-CM

## 2020-09-05 DIAGNOSIS — M10.9 GOUTY ARTHRITIS OF RIGHT GREAT TOE: ICD-10-CM

## 2020-09-05 DIAGNOSIS — I10 BENIGN HYPERTENSION: ICD-10-CM

## 2020-09-05 DIAGNOSIS — E55.9 VITAMIN D DEFICIENCY: ICD-10-CM

## 2020-09-05 DIAGNOSIS — E78.5 ELEVATED LIPIDS: ICD-10-CM

## 2020-09-05 LAB
25(OH)D3 SERPL-MCNC: 32.6 NG/ML (ref 30–100)
ALBUMIN SERPL BCP-MCNC: 3.5 G/DL (ref 3.5–5)
ALP SERPL-CCNC: 80 U/L (ref 46–116)
ALT SERPL W P-5'-P-CCNC: 26 U/L (ref 12–78)
ANION GAP SERPL CALCULATED.3IONS-SCNC: 8 MMOL/L (ref 4–13)
AST SERPL W P-5'-P-CCNC: 27 U/L (ref 5–45)
BASOPHILS # BLD AUTO: 0.06 THOUSANDS/ΜL (ref 0–0.1)
BASOPHILS NFR BLD AUTO: 1 % (ref 0–1)
BILIRUB SERPL-MCNC: 0.27 MG/DL (ref 0.2–1)
BUN SERPL-MCNC: 17 MG/DL (ref 5–25)
CALCIUM SERPL-MCNC: 9.2 MG/DL (ref 8.3–10.1)
CHLORIDE SERPL-SCNC: 106 MMOL/L (ref 100–108)
CHOLEST SERPL-MCNC: 230 MG/DL (ref 50–200)
CO2 SERPL-SCNC: 27 MMOL/L (ref 21–32)
CREAT SERPL-MCNC: 1.36 MG/DL (ref 0.6–1.3)
EOSINOPHIL # BLD AUTO: 0.46 THOUSAND/ΜL (ref 0–0.61)
EOSINOPHIL NFR BLD AUTO: 10 % (ref 0–6)
ERYTHROCYTE [DISTWIDTH] IN BLOOD BY AUTOMATED COUNT: 12.9 % (ref 11.6–15.1)
GFR SERPL CREATININE-BSD FRML MDRD: 67 ML/MIN/1.73SQ M
GLUCOSE P FAST SERPL-MCNC: 101 MG/DL (ref 65–99)
HCT VFR BLD AUTO: 44.1 % (ref 36.5–49.3)
HDLC SERPL-MCNC: 79 MG/DL
HGB BLD-MCNC: 14.7 G/DL (ref 12–17)
IMM GRANULOCYTES # BLD AUTO: 0.03 THOUSAND/UL (ref 0–0.2)
IMM GRANULOCYTES NFR BLD AUTO: 1 % (ref 0–2)
LDLC SERPL CALC-MCNC: 88 MG/DL (ref 0–100)
LYMPHOCYTES # BLD AUTO: 0.95 THOUSANDS/ΜL (ref 0.6–4.47)
LYMPHOCYTES NFR BLD AUTO: 21 % (ref 14–44)
MCH RBC QN AUTO: 31.6 PG (ref 26.8–34.3)
MCHC RBC AUTO-ENTMCNC: 33.3 G/DL (ref 31.4–37.4)
MCV RBC AUTO: 95 FL (ref 82–98)
MONOCYTES # BLD AUTO: 0.54 THOUSAND/ΜL (ref 0.17–1.22)
MONOCYTES NFR BLD AUTO: 12 % (ref 4–12)
NEUTROPHILS # BLD AUTO: 2.5 THOUSANDS/ΜL (ref 1.85–7.62)
NEUTS SEG NFR BLD AUTO: 55 % (ref 43–75)
NONHDLC SERPL-MCNC: 151 MG/DL
NRBC BLD AUTO-RTO: 0 /100 WBCS
PLATELET # BLD AUTO: 378 THOUSANDS/UL (ref 149–390)
PMV BLD AUTO: 10 FL (ref 8.9–12.7)
POTASSIUM SERPL-SCNC: 4.2 MMOL/L (ref 3.5–5.3)
PROT SERPL-MCNC: 7.8 G/DL (ref 6.4–8.2)
RBC # BLD AUTO: 4.65 MILLION/UL (ref 3.88–5.62)
SODIUM SERPL-SCNC: 141 MMOL/L (ref 136–145)
TRIGL SERPL-MCNC: 315 MG/DL
URATE SERPL-MCNC: 8.8 MG/DL (ref 4.2–8)
WBC # BLD AUTO: 4.54 THOUSAND/UL (ref 4.31–10.16)

## 2020-09-05 PROCEDURE — 82306 VITAMIN D 25 HYDROXY: CPT

## 2020-09-05 PROCEDURE — 80061 LIPID PANEL: CPT

## 2020-09-05 PROCEDURE — 85025 COMPLETE CBC W/AUTO DIFF WBC: CPT

## 2020-09-05 PROCEDURE — 84550 ASSAY OF BLOOD/URIC ACID: CPT

## 2020-09-05 PROCEDURE — 87389 HIV-1 AG W/HIV-1&-2 AB AG IA: CPT

## 2020-09-05 PROCEDURE — 80053 COMPREHEN METABOLIC PANEL: CPT

## 2020-09-05 PROCEDURE — 36415 COLL VENOUS BLD VENIPUNCTURE: CPT

## 2020-09-09 ENCOUNTER — OFFICE VISIT (OUTPATIENT)
Dept: INTERNAL MEDICINE CLINIC | Facility: CLINIC | Age: 55
End: 2020-09-09
Payer: COMMERCIAL

## 2020-09-09 VITALS
HEIGHT: 68 IN | DIASTOLIC BLOOD PRESSURE: 80 MMHG | TEMPERATURE: 98.5 F | WEIGHT: 233 LBS | SYSTOLIC BLOOD PRESSURE: 134 MMHG | BODY MASS INDEX: 35.31 KG/M2 | OXYGEN SATURATION: 97 % | HEART RATE: 88 BPM

## 2020-09-09 DIAGNOSIS — R73.01 IMPAIRED FASTING GLUCOSE: ICD-10-CM

## 2020-09-09 DIAGNOSIS — I10 BENIGN HYPERTENSION: Primary | ICD-10-CM

## 2020-09-09 DIAGNOSIS — E78.5 ELEVATED LIPIDS: ICD-10-CM

## 2020-09-09 DIAGNOSIS — E55.9 VITAMIN D DEFICIENCY: ICD-10-CM

## 2020-09-09 DIAGNOSIS — F32.1 MAJOR DEPRESSIVE DISORDER, SINGLE EPISODE, MODERATE (HCC): ICD-10-CM

## 2020-09-09 LAB — HIV 1+2 AB+HIV1 P24 AG SERPL QL IA: NORMAL

## 2020-09-09 PROCEDURE — 1036F TOBACCO NON-USER: CPT | Performed by: PHYSICIAN ASSISTANT

## 2020-09-09 PROCEDURE — 3079F DIAST BP 80-89 MM HG: CPT | Performed by: PHYSICIAN ASSISTANT

## 2020-09-09 PROCEDURE — 99214 OFFICE O/P EST MOD 30 MIN: CPT | Performed by: PHYSICIAN ASSISTANT

## 2020-09-09 PROCEDURE — 3075F SYST BP GE 130 - 139MM HG: CPT | Performed by: PHYSICIAN ASSISTANT

## 2020-09-09 NOTE — PROGRESS NOTES
Assessment/Plan:   Patient Instructions   Her no change in current medications  Would like patient to tighten up diet to get cholesterol back to wear was last year  Continued exercise  Schedule follow-up with repeat labs in 4 months, sooner as needed  Quality Measures:       Return in about 4 months (around 1/9/2021) for Next scheduled follow up  Diagnoses and all orders for this visit:    Benign hypertension  -     Comprehensive metabolic panel; Future  -     Lipid panel; Future    Elevated lipids    Major depressive disorder, single episode, moderate (HCC)    Vitamin D deficiency    Impaired fasting glucose  -     Hemoglobin A1C; Future          Subjective:      Patient ID: Jose Sutherland is a 54 y o  male  Follow-up    Hypertension:  Decent control on his current medication  Denies any side effects  Sidra cp, palp, sob, edema, HA, dizziness, diaphoresis, syncope, visual disturbance  Depression:  On escitalopram   Finds this very effective  Has noted 1 side effect in that it has affected his ejaculation  This however is not a concern to him at this time  Personal life is good, work life is also good  Vitamin-D deficiency:  On vitamin-D supplementation  Vitamin-D level in low normal range  Denies bone pain, muscle cramping, muscle pain  Intermittent bronchospasm:  Patient notices this when he is at his girlfriend's house around her dog  Periodically has use the albuterol inhaler, but this is not a regular occurrence  Does not have to use inhaler on any type of regularity  Lipids elevated this time compared to 1 year ago  Patient admits his diet has changed and he will work very hard on getting did under control  He states he is still active and exercises  Tries to control his weight as best he can  Impaired fasting glucose:  Patient will work on reducing intake of concentrated sweets  Fasting sugar was 101  Denies polyuria, polyphagia, polydipsia  ALLERGIES:  No Known Allergies    CURRENT MEDICATIONS:    Current Outpatient Medications:     albuterol (Ventolin HFA) 90 mcg/act inhaler, Inhale 2 puffs every 6 (six) hours as needed for wheezing, Disp: 1 Inhaler, Rfl: 0    amLODIPine (NORVASC) 10 mg tablet, Take 1 tablet (10 mg total) by mouth daily, Disp: 90 tablet, Rfl: 1    ergocalciferol (VITAMIN D2) 50,000 units, Take 1 capsule (50,000 Units total) by mouth once a week, Disp: 12 capsule, Rfl: 1    escitalopram (LEXAPRO) 10 mg tablet, Take 1 tablet (10 mg total) by mouth daily, Disp: 30 tablet, Rfl: 5    losartan (COZAAR) 100 MG tablet, TAKE 1 TABLET BY MOUTH EVERY DAY, Disp: 90 tablet, Rfl: 1    ACTIVE PROBLEM LIST:  Patient Active Problem List   Diagnosis    Benign hypertension    Elevated lipids    LVH (left ventricular hypertrophy)    Major depressive disorder, single episode, moderate (HCC)    Obesity    Vitamin D deficiency    Chronic pain of right knee    Sleeping difficulty    Gouty arthritis of right great toe    Left foot pain    Acute gout of left foot       PAST MEDICAL HISTORY:  Past Medical History:   Diagnosis Date    Dyspnea on exertion     last assessed: 3/11/2016    Hypertension     Major depressive disorder, single episode, moderate (HCC) 12/19/2016       PAST SURGICAL HISTORY:  Past Surgical History:   Procedure Laterality Date    FIBULA FRACTURE SURGERY      HERNIA REPAIR      TONSILLECTOMY         FAMILY HISTORY:  Family History   Problem Relation Age of Onset    Leukemia Mother         acute myeloid    Hypertension Mother     Multiple myeloma Mother     Diabetes Father         mellitus    Hypertension Father     Pneumonia Father         NSIP (nonspecific interstital pneumonia)       SOCIAL HISTORY:  Social History     Socioeconomic History    Marital status: Legally      Spouse name: Not on file    Number of children: 0    Years of education: Not on file    Highest education level: Not on file   Occupational History    Occupation:  for [address] [place] [address] Avenue: full-time employment   Social Needs    Financial resource strain: Not on file    Food insecurity     Worry: Not on file     Inability: Not on file   [employer] needs     Medical: Not on file     Non-medical: Not on file   Tobacco Use    Smoking status: Never Smoker    Smokeless tobacco: Never Used   Substance and Sexual Activity    Alcohol use: Not Currently     Comment: no alchol use (as per allscripts)    Drug use: No    Sexual activity: Not on file   Lifestyle    Physical activity     Days per week: Not on file     Minutes per session: Not on file    Stress: Not on file   Relationships    Social connections     Talks on phone: Not on file     Gets together: Not on file     Attends [employer] service: Not on file     Active member of club or organization: Not on file     Attends meetings of clubs or organizations: Not on file     Relationship status: Not on file    Intimate partner violence     Fear of current or ex partner: Not on file     Emotionally abused: Not on file     Physically abused: Not on file     Forced sexual activity: Not on file   Other Topics Concern    Not on file   Social History Narrative    Brushes teeth twice a day    Dental care, regularly    Exercise: walking           Review of Systems   Constitutional: Negative for activity change, chills, fatigue and fever  HENT: Negative for congestion, postnasal drip, rhinorrhea and sneezing  Eyes: Negative for discharge, redness and itching  Respiratory: Negative for cough, chest tightness and shortness of breath  Cardiovascular: Negative for chest pain, palpitations and leg swelling  Gastrointestinal: Negative for abdominal pain, blood in stool, constipation, diarrhea, nausea and vomiting  Endocrine: Negative for polydipsia, polyphagia and polyuria  Genitourinary: Negative for difficulty urinating     Musculoskeletal: Negative for arthralgias and myalgias  Skin: Negative for rash  Allergic/Immunologic: Negative for immunocompromised state  Neurological: Negative for dizziness, syncope, weakness, light-headedness and headaches  Hematological: Negative for adenopathy  Does not bruise/bleed easily  Psychiatric/Behavioral: Negative for dysphoric mood, sleep disturbance and suicidal ideas  The patient is not nervous/anxious  Objective:  Vitals:    09/09/20 0821   BP: 134/80   BP Location: Left arm   Patient Position: Sitting   Cuff Size: Adult   Pulse: 105   Temp: 98 5 °F (36 9 °C)   TempSrc: Temporal   SpO2: 97%   Weight: 106 kg (233 lb)   Height: 5' 8" (1 727 m)     Body mass index is 35 43 kg/m²  Physical Exam  Vitals signs and nursing note reviewed  Constitutional:       General: He is not in acute distress  Appearance: He is well-developed  HENT:      Head: Normocephalic and atraumatic  Neck:      Thyroid: No thyromegaly  Vascular: No carotid bruit or JVD  Cardiovascular:      Rate and Rhythm: Normal rate and regular rhythm  Heart sounds: Normal heart sounds  Pulmonary:      Effort: Pulmonary effort is normal  No respiratory distress  Breath sounds: Normal breath sounds  Musculoskeletal:         General: No swelling  Right lower leg: No edema  Left lower leg: No edema  Lymphadenopathy:      Cervical: No cervical adenopathy  Skin:     General: Skin is warm and dry  Findings: No rash  Neurological:      General: No focal deficit present  Mental Status: He is alert and oriented to person, place, and time  Mental status is at baseline     Psychiatric:         Mood and Affect: Mood normal          Behavior: Behavior normal            RESULTS:    Recent Results (from the past 1008 hour(s))   CBC and differential    Collection Time: 09/05/20  7:03 AM   Result Value Ref Range    WBC 4 54 4 31 - 10 16 Thousand/uL    RBC 4 65 3 88 - 5 62 Million/uL    Hemoglobin 14 7 12 0 - 17 0 g/dL    Hematocrit 44 1 36 5 - 49 3 %    MCV 95 82 - 98 fL    MCH 31 6 26 8 - 34 3 pg    MCHC 33 3 31 4 - 37 4 g/dL    RDW 12 9 11 6 - 15 1 %    MPV 10 0 8 9 - 12 7 fL    Platelets 524 064 - 329 Thousands/uL    nRBC 0 /100 WBCs    Neutrophils Relative 55 43 - 75 %    Immat GRANS % 1 0 - 2 %    Lymphocytes Relative 21 14 - 44 %    Monocytes Relative 12 4 - 12 %    Eosinophils Relative 10 (H) 0 - 6 %    Basophils Relative 1 0 - 1 %    Neutrophils Absolute 2 50 1 85 - 7 62 Thousands/µL    Immature Grans Absolute 0 03 0 00 - 0 20 Thousand/uL    Lymphocytes Absolute 0 95 0 60 - 4 47 Thousands/µL    Monocytes Absolute 0 54 0 17 - 1 22 Thousand/µL    Eosinophils Absolute 0 46 0 00 - 0 61 Thousand/µL    Basophils Absolute 0 06 0 00 - 0 10 Thousands/µL   Comprehensive metabolic panel    Collection Time: 09/05/20  7:03 AM   Result Value Ref Range    Sodium 141 136 - 145 mmol/L    Potassium 4 2 3 5 - 5 3 mmol/L    Chloride 106 100 - 108 mmol/L    CO2 27 21 - 32 mmol/L    ANION GAP 8 4 - 13 mmol/L    BUN 17 5 - 25 mg/dL    Creatinine 1 36 (H) 0 60 - 1 30 mg/dL    Glucose, Fasting 101 (H) 65 - 99 mg/dL    Calcium 9 2 8 3 - 10 1 mg/dL    AST 27 5 - 45 U/L    ALT 26 12 - 78 U/L    Alkaline Phosphatase 80 46 - 116 U/L    Total Protein 7 8 6 4 - 8 2 g/dL    Albumin 3 5 3 5 - 5 0 g/dL    Total Bilirubin 0 27 0 20 - 1 00 mg/dL    eGFR 67 ml/min/1 73sq m   Lipid panel    Collection Time: 09/05/20  7:03 AM   Result Value Ref Range    Cholesterol 230 (H) 50 - 200 mg/dL    Triglycerides 315 (H) <=150 mg/dL    HDL, Direct 79 >=40 mg/dL    LDL Calculated 88 0 - 100 mg/dL    Non-HDL-Chol (CHOL-HDL) 151 mg/dl   Uric acid    Collection Time: 09/05/20  7:03 AM   Result Value Ref Range    Uric Acid 8 8 (H) 4 2 - 8 0 mg/dL   Vitamin D 25 hydroxy    Collection Time: 09/05/20  7:03 AM   Result Value Ref Range    Vit D, 25-Hydroxy 32 6 30 0 - 100 0 ng/mL       This note was created with voice recognition software    Phonic, grammatical and spelling errors may be present within the note as a result

## 2020-09-09 NOTE — PATIENT INSTRUCTIONS
Her no change in current medications  Would like patient to tighten up diet to get cholesterol back to wear was last year  Continued exercise  Schedule follow-up with repeat labs in 4 months, sooner as needed

## 2020-09-24 DIAGNOSIS — J98.01 BRONCHOSPASM: ICD-10-CM

## 2020-09-24 RX ORDER — ALBUTEROL SULFATE 90 UG/1
2 AEROSOL, METERED RESPIRATORY (INHALATION) EVERY 6 HOURS PRN
Qty: 1 INHALER | Refills: 0 | Status: SHIPPED | OUTPATIENT
Start: 2020-09-24 | End: 2020-10-18

## 2020-10-17 DIAGNOSIS — J98.01 BRONCHOSPASM: ICD-10-CM

## 2020-10-18 RX ORDER — ALBUTEROL SULFATE 90 UG/1
AEROSOL, METERED RESPIRATORY (INHALATION)
Qty: 6.7 INHALER | Refills: 3 | Status: SHIPPED | OUTPATIENT
Start: 2020-10-18 | End: 2020-11-23 | Stop reason: ALTCHOICE

## 2020-11-23 DIAGNOSIS — J98.01 BRONCHOSPASM: ICD-10-CM

## 2020-11-23 DIAGNOSIS — J98.01 BRONCHOSPASM: Primary | ICD-10-CM

## 2020-11-23 RX ORDER — ALBUTEROL SULFATE 90 UG/1
AEROSOL, METERED RESPIRATORY (INHALATION)
Qty: 6.7 INHALER | Refills: 3 | OUTPATIENT
Start: 2020-11-23

## 2020-11-23 RX ORDER — ALBUTEROL SULFATE 90 UG/1
2 AEROSOL, METERED RESPIRATORY (INHALATION) EVERY 6 HOURS PRN
Qty: 1 INHALER | Refills: 3 | Status: SHIPPED | OUTPATIENT
Start: 2020-11-23 | End: 2021-07-05

## 2021-02-25 DIAGNOSIS — J98.01 BRONCHOSPASM: ICD-10-CM

## 2021-02-25 RX ORDER — ALBUTEROL SULFATE 90 UG/1
2 AEROSOL, METERED RESPIRATORY (INHALATION) EVERY 6 HOURS PRN
Qty: 1 INHALER | Refills: 3 | OUTPATIENT
Start: 2021-02-25

## 2021-04-12 ENCOUNTER — IMMUNIZATIONS (OUTPATIENT)
Dept: FAMILY MEDICINE CLINIC | Facility: HOSPITAL | Age: 56
End: 2021-04-12

## 2021-04-12 DIAGNOSIS — Z23 ENCOUNTER FOR IMMUNIZATION: Primary | ICD-10-CM

## 2021-04-12 PROCEDURE — 91300 SARS-COV-2 / COVID-19 MRNA VACCINE (PFIZER-BIONTECH) 30 MCG: CPT

## 2021-04-12 PROCEDURE — 0001A SARS-COV-2 / COVID-19 MRNA VACCINE (PFIZER-BIONTECH) 30 MCG: CPT

## 2021-04-20 DIAGNOSIS — I10 BENIGN HYPERTENSION: ICD-10-CM

## 2021-04-20 DIAGNOSIS — I10 ESSENTIAL HYPERTENSION: ICD-10-CM

## 2021-04-20 RX ORDER — LOSARTAN POTASSIUM 100 MG/1
100 TABLET ORAL DAILY
Qty: 90 TABLET | Refills: 0 | Status: SHIPPED | OUTPATIENT
Start: 2021-04-20 | End: 2021-07-13

## 2021-05-01 ENCOUNTER — APPOINTMENT (OUTPATIENT)
Dept: LAB | Facility: CLINIC | Age: 56
End: 2021-05-01
Payer: COMMERCIAL

## 2021-05-01 DIAGNOSIS — I10 BENIGN HYPERTENSION: ICD-10-CM

## 2021-05-01 DIAGNOSIS — R73.01 IMPAIRED FASTING GLUCOSE: ICD-10-CM

## 2021-05-01 LAB
ALBUMIN SERPL BCP-MCNC: 3.7 G/DL (ref 3.5–5)
ALP SERPL-CCNC: 70 U/L (ref 46–116)
ALT SERPL W P-5'-P-CCNC: 28 U/L (ref 12–78)
ANION GAP SERPL CALCULATED.3IONS-SCNC: 6 MMOL/L (ref 4–13)
AST SERPL W P-5'-P-CCNC: 25 U/L (ref 5–45)
BILIRUB SERPL-MCNC: 0.35 MG/DL (ref 0.2–1)
BUN SERPL-MCNC: 17 MG/DL (ref 5–25)
CALCIUM SERPL-MCNC: 9.8 MG/DL (ref 8.3–10.1)
CHLORIDE SERPL-SCNC: 103 MMOL/L (ref 100–108)
CHOLEST SERPL-MCNC: 238 MG/DL (ref 50–200)
CO2 SERPL-SCNC: 28 MMOL/L (ref 21–32)
CREAT SERPL-MCNC: 1.31 MG/DL (ref 0.6–1.3)
EST. AVERAGE GLUCOSE BLD GHB EST-MCNC: 123 MG/DL
GFR SERPL CREATININE-BSD FRML MDRD: 70 ML/MIN/1.73SQ M
GLUCOSE P FAST SERPL-MCNC: 87 MG/DL (ref 65–99)
HBA1C MFR BLD: 5.9 %
HDLC SERPL-MCNC: 65 MG/DL
LDLC SERPL CALC-MCNC: 125 MG/DL (ref 0–100)
NONHDLC SERPL-MCNC: 173 MG/DL
POTASSIUM SERPL-SCNC: 4.7 MMOL/L (ref 3.5–5.3)
PROT SERPL-MCNC: 7.8 G/DL (ref 6.4–8.2)
SODIUM SERPL-SCNC: 137 MMOL/L (ref 136–145)
TRIGL SERPL-MCNC: 240 MG/DL

## 2021-05-01 PROCEDURE — 83036 HEMOGLOBIN GLYCOSYLATED A1C: CPT

## 2021-05-01 PROCEDURE — 80053 COMPREHEN METABOLIC PANEL: CPT

## 2021-05-01 PROCEDURE — 36415 COLL VENOUS BLD VENIPUNCTURE: CPT

## 2021-05-01 PROCEDURE — 80061 LIPID PANEL: CPT

## 2021-05-05 ENCOUNTER — IMMUNIZATIONS (OUTPATIENT)
Dept: FAMILY MEDICINE CLINIC | Facility: HOSPITAL | Age: 56
End: 2021-05-05

## 2021-05-05 DIAGNOSIS — Z23 ENCOUNTER FOR IMMUNIZATION: Primary | ICD-10-CM

## 2021-05-05 PROCEDURE — 91300 SARS-COV-2 / COVID-19 MRNA VACCINE (PFIZER-BIONTECH) 30 MCG: CPT

## 2021-05-05 PROCEDURE — 0002A SARS-COV-2 / COVID-19 MRNA VACCINE (PFIZER-BIONTECH) 30 MCG: CPT

## 2021-05-06 ENCOUNTER — OFFICE VISIT (OUTPATIENT)
Dept: INTERNAL MEDICINE CLINIC | Facility: CLINIC | Age: 56
End: 2021-05-06
Payer: COMMERCIAL

## 2021-05-06 VITALS
SYSTOLIC BLOOD PRESSURE: 132 MMHG | HEART RATE: 86 BPM | HEIGHT: 68 IN | WEIGHT: 245 LBS | RESPIRATION RATE: 16 BRPM | OXYGEN SATURATION: 99 % | BODY MASS INDEX: 37.13 KG/M2 | TEMPERATURE: 98.6 F | DIASTOLIC BLOOD PRESSURE: 86 MMHG

## 2021-05-06 DIAGNOSIS — M10.9 GOUTY ARTHRITIS OF RIGHT GREAT TOE: ICD-10-CM

## 2021-05-06 DIAGNOSIS — F32.1 MAJOR DEPRESSIVE DISORDER, SINGLE EPISODE, MODERATE (HCC): ICD-10-CM

## 2021-05-06 DIAGNOSIS — I10 BENIGN HYPERTENSION: Primary | ICD-10-CM

## 2021-05-06 DIAGNOSIS — R73.01 IMPAIRED FASTING GLUCOSE: ICD-10-CM

## 2021-05-06 DIAGNOSIS — E78.5 ELEVATED LIPIDS: ICD-10-CM

## 2021-05-06 DIAGNOSIS — E55.9 VITAMIN D DEFICIENCY: ICD-10-CM

## 2021-05-06 PROCEDURE — 3079F DIAST BP 80-89 MM HG: CPT | Performed by: PHYSICIAN ASSISTANT

## 2021-05-06 PROCEDURE — 99214 OFFICE O/P EST MOD 30 MIN: CPT | Performed by: PHYSICIAN ASSISTANT

## 2021-05-06 PROCEDURE — 3725F SCREEN DEPRESSION PERFORMED: CPT | Performed by: PHYSICIAN ASSISTANT

## 2021-05-06 PROCEDURE — 3008F BODY MASS INDEX DOCD: CPT | Performed by: PHYSICIAN ASSISTANT

## 2021-05-06 PROCEDURE — 3075F SYST BP GE 130 - 139MM HG: CPT | Performed by: PHYSICIAN ASSISTANT

## 2021-05-06 PROCEDURE — 1036F TOBACCO NON-USER: CPT | Performed by: PHYSICIAN ASSISTANT

## 2021-05-06 NOTE — PATIENT INSTRUCTIONS
No change in current medications  Patient to actively work on weight control through exercise and diet to also help improve his cholesterol  Schedule follow-up in 4 months with repeat labs for that visit, sooner as needed

## 2021-05-06 NOTE — PROGRESS NOTES
Assessment/Plan:   Patient Instructions   No change in current medications  Patient to actively work on weight control through exercise and diet to also help improve his cholesterol  Schedule follow-up in 4 months with repeat labs for that visit, sooner as needed  Quality Measures: BMI Counseling: Body mass index is 37 25 kg/m²  The BMI is above normal  Nutrition recommendations include decreasing portion sizes, encouraging healthy choices of fruits and vegetables, consuming healthier snacks and moderation in carbohydrate intake  Exercise recommendations include exercising 3-5 times per week  No follow-ups on file  Diagnoses and all orders for this visit:    Benign hypertension  -     CBC and differential; Future  -     Comprehensive metabolic panel; Future    Elevated lipids  -     Lipid panel; Future    Major depressive disorder, single episode, moderate (HCC)    Vitamin D deficiency    Impaired fasting glucose  -     Hemoglobin A1C; Future    Gouty arthritis of right great toe  -     Uric acid; Future          Subjective:      Patient ID: Isidra Linton is a 64 y o  male  Follow up, labs reviewed with patient  Patient missed last follow-up during pandemic  Hypertension:  Good control at this point with his current medications  Sidra cp, palp, sob, edema, HA, dizziness, diaphoresis, syncope, visual disturbance  Hyperlipidemia:  Not on any medication  Weight has gone up since he has not been going to the gym, but states he restarted this behavior recently  Lipids have gone up  Discussed medication however patient is refusing as in his past he was successful at it getting his lipids down through diet and exercise  Impaired fasting glucose:  Normal fasting sugar on his current labs  A1c of 5 9  Discussion held on importance of diet modification and weight control  Continues taking his vitamin-D supplementation    No complaint of muscle twitching or myalgias  Depression:  Had been doing well, he states however during the pandemic in March he admits to being a little depressed  This is much improved  His current relationship is going well  His work is also going well at this time  Psychosocial stressors are all manageable  Gout:  Has not been acting up        ALLERGIES:  No Known Allergies    CURRENT MEDICATIONS:    Current Outpatient Medications:     albuterol (Ventolin HFA) 90 mcg/act inhaler, Inhale 2 puffs every 6 (six) hours as needed for wheezing, Disp: 1 Inhaler, Rfl: 3    amLODIPine (NORVASC) 10 mg tablet, Take 1 tablet (10 mg total) by mouth daily, Disp: 90 tablet, Rfl: 1    ergocalciferol (VITAMIN D2) 50,000 units, Take 1 capsule (50,000 Units total) by mouth once a week, Disp: 12 capsule, Rfl: 1    escitalopram (LEXAPRO) 10 mg tablet, Take 1 tablet (10 mg total) by mouth daily, Disp: 30 tablet, Rfl: 5    losartan (COZAAR) 100 MG tablet, Take 1 tablet (100 mg total) by mouth daily, Disp: 90 tablet, Rfl: 0    ACTIVE PROBLEM LIST:  Patient Active Problem List   Diagnosis    Benign hypertension    Elevated lipids    LVH (left ventricular hypertrophy)    Major depressive disorder, single episode, moderate (HCC)    Obesity    Vitamin D deficiency    Chronic pain of right knee    Sleeping difficulty    Gouty arthritis of right great toe    Left foot pain    Acute gout of left foot       PAST MEDICAL HISTORY:  Past Medical History:   Diagnosis Date    Dyspnea on exertion     last assessed: 3/11/2016    Hypertension     Major depressive disorder, single episode, moderate (HCC) 12/19/2016       PAST SURGICAL HISTORY:  Past Surgical History:   Procedure Laterality Date    FIBULA FRACTURE SURGERY      HERNIA REPAIR      TONSILLECTOMY         FAMILY HISTORY:  Family History   Problem Relation Age of Onset    Leukemia Mother         acute myeloid    Hypertension Mother     Multiple myeloma Mother     Diabetes Father mellitus    Hypertension Father     Pneumonia Father         NSIP (nonspecific interstital pneumonia)       SOCIAL HISTORY:  Social History     Socioeconomic History    Marital status: Legally      Spouse name: Not on file    Number of children: 0    Years of education: Not on file    Highest education level: Not on file   Occupational History    Occupation:  for [employer]     Comment: full-time employment   Social Needs    Financial resource strain: Not on file    Food insecurity     Worry: Not on file     Inability: Not on file   [employer] needs     Medical: Not on file     Non-medical: Not on file   Tobacco Use    Smoking status: Never Smoker    Smokeless tobacco: Never Used   Substance and Sexual Activity    Alcohol use: Not Currently     Comment: no alchol use (as per allscripts)    Drug use: No    Sexual activity: Not on file   Lifestyle    Physical activity     Days per week: Not on file     Minutes per session: Not on file    Stress: Not on file   Relationships    Social connections     Talks on phone: Not on file     Gets together: Not on file     Attends Yars[employer]ism service: Not on file     Active member of club or organization: Not on file     Attends meetings of clubs or organizations: Not on file     Relationship status: Not on file    Intimate partner violence     Fear of current or ex partner: Not on file     Emotionally abused: Not on file     Physically abused: Not on file     Forced sexual activity: Not on file   Other Topics Concern    Not on file   Social History Narrative    Brushes teeth twice a day    Dental care, regularly    Exercise: walking           Review of Systems   Constitutional: Negative for activity change, chills, fatigue and fever  HENT: Negative for congestion  Eyes: Negative for discharge and visual disturbance  Respiratory: Negative for cough, chest tightness and shortness of breath      Cardiovascular: Negative for chest pain, palpitations and leg swelling  Gastrointestinal: Negative for abdominal pain  Endocrine: Negative for polydipsia, polyphagia and polyuria  Genitourinary: Negative for difficulty urinating  Musculoskeletal: Negative for arthralgias and myalgias  Skin: Negative for rash  Allergic/Immunologic: Negative for immunocompromised state  Neurological: Negative for dizziness, syncope, weakness, light-headedness and headaches  Hematological: Negative for adenopathy  Does not bruise/bleed easily  Psychiatric/Behavioral: Negative for dysphoric mood, sleep disturbance and suicidal ideas  The patient is not nervous/anxious  Objective:  Vitals:    05/06/21 0902   BP: 132/86   BP Location: Right arm   Patient Position: Sitting   Cuff Size: Large   Pulse: 86   Resp: 16   Temp: 98 6 °F (37 °C)   TempSrc: Tympanic   SpO2: 99%   Weight: 111 kg (245 lb)   Height: 5' 8" (1 727 m)     Body mass index is 37 25 kg/m²  Physical Exam  Vitals signs and nursing note reviewed  Constitutional:       General: He is not in acute distress  Appearance: He is well-developed  He is obese  HENT:      Head: Normocephalic and atraumatic  Eyes:      Pupils: Pupils are equal, round, and reactive to light  Neck:      Musculoskeletal: Neck supple  Thyroid: No thyromegaly  Vascular: No carotid bruit or JVD  Cardiovascular:      Rate and Rhythm: Normal rate and regular rhythm  Heart sounds: Normal heart sounds  Pulmonary:      Effort: Pulmonary effort is normal  No respiratory distress  Breath sounds: Normal breath sounds  Musculoskeletal:      Right lower leg: No edema  Left lower leg: No edema  Lymphadenopathy:      Cervical: No cervical adenopathy  Skin:     General: Skin is warm and dry  Findings: No rash  Neurological:      General: No focal deficit present  Mental Status: He is alert and oriented to person, place, and time  Mental status is at baseline     Psychiatric: Mood and Affect: Mood normal          Behavior: Behavior normal            RESULTS:    Recent Results (from the past 1008 hour(s))   Comprehensive metabolic panel    Collection Time: 05/01/21  7:08 AM   Result Value Ref Range    Sodium 137 136 - 145 mmol/L    Potassium 4 7 3 5 - 5 3 mmol/L    Chloride 103 100 - 108 mmol/L    CO2 28 21 - 32 mmol/L    ANION GAP 6 4 - 13 mmol/L    BUN 17 5 - 25 mg/dL    Creatinine 1 31 (H) 0 60 - 1 30 mg/dL    Glucose, Fasting 87 65 - 99 mg/dL    Calcium 9 8 8 3 - 10 1 mg/dL    AST 25 5 - 45 U/L    ALT 28 12 - 78 U/L    Alkaline Phosphatase 70 46 - 116 U/L    Total Protein 7 8 6 4 - 8 2 g/dL    Albumin 3 7 3 5 - 5 0 g/dL    Total Bilirubin 0 35 0 20 - 1 00 mg/dL    eGFR 70 ml/min/1 73sq m   Lipid panel    Collection Time: 05/01/21  7:08 AM   Result Value Ref Range    Cholesterol 238 (H) 50 - 200 mg/dL    Triglycerides 240 (H) <=150 mg/dL    HDL, Direct 65 >=40 mg/dL    LDL Calculated 125 (H) 0 - 100 mg/dL    Non-HDL-Chol (CHOL-HDL) 173 mg/dl   Hemoglobin A1C    Collection Time: 05/01/21  7:08 AM   Result Value Ref Range    Hemoglobin A1C 5 9 (H) Normal 3 8-5 6%; PreDiabetic 5 7-6 4%; Diabetic >=6 5%; Glycemic control for adults with diabetes <7 0% %     mg/dl       This note was created with voice recognition software  Phonic, grammatical and spelling errors may be present within the note as a result

## 2021-06-09 DIAGNOSIS — F32.1 MAJOR DEPRESSIVE DISORDER, SINGLE EPISODE, MODERATE (HCC): ICD-10-CM

## 2021-06-09 RX ORDER — ESCITALOPRAM OXALATE 10 MG/1
10 TABLET ORAL DAILY
Qty: 30 TABLET | Refills: 5 | Status: SHIPPED | OUTPATIENT
Start: 2021-06-09 | End: 2021-12-06 | Stop reason: SDUPTHER

## 2021-07-07 DIAGNOSIS — E55.9 VITAMIN D DEFICIENCY: ICD-10-CM

## 2021-07-07 RX ORDER — ERGOCALCIFEROL 1.25 MG/1
CAPSULE ORAL
Qty: 12 CAPSULE | Refills: 1 | Status: SHIPPED | OUTPATIENT
Start: 2021-07-07 | End: 2021-12-06 | Stop reason: SDUPTHER

## 2021-07-13 DIAGNOSIS — I10 ESSENTIAL HYPERTENSION: ICD-10-CM

## 2021-07-13 DIAGNOSIS — I10 BENIGN HYPERTENSION: ICD-10-CM

## 2021-07-13 RX ORDER — LOSARTAN POTASSIUM 100 MG/1
TABLET ORAL
Qty: 90 TABLET | Refills: 1 | Status: SHIPPED | OUTPATIENT
Start: 2021-07-13 | End: 2021-12-06 | Stop reason: SDUPTHER

## 2021-12-06 DIAGNOSIS — F32.1 MAJOR DEPRESSIVE DISORDER, SINGLE EPISODE, MODERATE (HCC): ICD-10-CM

## 2021-12-06 DIAGNOSIS — I10 ESSENTIAL HYPERTENSION: ICD-10-CM

## 2021-12-06 DIAGNOSIS — E55.9 VITAMIN D DEFICIENCY: ICD-10-CM

## 2021-12-06 DIAGNOSIS — I10 BENIGN HYPERTENSION: ICD-10-CM

## 2021-12-07 RX ORDER — ESCITALOPRAM OXALATE 10 MG/1
10 TABLET ORAL DAILY
Qty: 30 TABLET | Refills: 1 | Status: SHIPPED | OUTPATIENT
Start: 2021-12-07 | End: 2021-12-07

## 2021-12-07 RX ORDER — ERGOCALCIFEROL 1.25 MG/1
50000 CAPSULE ORAL WEEKLY
Qty: 4 CAPSULE | Refills: 1 | Status: SHIPPED | OUTPATIENT
Start: 2021-12-07 | End: 2022-02-01

## 2021-12-07 RX ORDER — AMLODIPINE BESYLATE 10 MG/1
10 TABLET ORAL DAILY
Qty: 30 TABLET | Refills: 1 | Status: SHIPPED | OUTPATIENT
Start: 2021-12-07 | End: 2022-01-03

## 2021-12-07 RX ORDER — LOSARTAN POTASSIUM 100 MG/1
100 TABLET ORAL DAILY
Qty: 30 TABLET | Refills: 1 | Status: SHIPPED | OUTPATIENT
Start: 2021-12-07 | End: 2022-01-03

## 2021-12-29 ENCOUNTER — OFFICE VISIT (OUTPATIENT)
Dept: URGENT CARE | Facility: MEDICAL CENTER | Age: 56
End: 2021-12-29
Payer: COMMERCIAL

## 2021-12-29 VITALS
OXYGEN SATURATION: 99 % | SYSTOLIC BLOOD PRESSURE: 168 MMHG | RESPIRATION RATE: 18 BRPM | HEART RATE: 78 BPM | DIASTOLIC BLOOD PRESSURE: 100 MMHG | TEMPERATURE: 98 F

## 2021-12-29 DIAGNOSIS — M10.9 ACUTE GOUT INVOLVING TOE OF LEFT FOOT, UNSPECIFIED CAUSE: Primary | ICD-10-CM

## 2021-12-29 PROCEDURE — G0382 LEV 3 HOSP TYPE B ED VISIT: HCPCS | Performed by: PHYSICIAN ASSISTANT

## 2021-12-29 RX ORDER — PREDNISONE 20 MG/1
40 TABLET ORAL DAILY
Qty: 10 TABLET | Refills: 0 | Status: SHIPPED | OUTPATIENT
Start: 2021-12-29 | End: 2022-01-03

## 2021-12-29 RX ORDER — INDOMETHACIN 50 MG/1
50 CAPSULE ORAL 2 TIMES DAILY WITH MEALS
Qty: 10 CAPSULE | Refills: 0 | Status: SHIPPED | OUTPATIENT
Start: 2021-12-29 | End: 2022-01-22

## 2022-01-01 DIAGNOSIS — I10 ESSENTIAL HYPERTENSION: ICD-10-CM

## 2022-01-01 DIAGNOSIS — I10 BENIGN HYPERTENSION: ICD-10-CM

## 2022-01-03 RX ORDER — LOSARTAN POTASSIUM 100 MG/1
TABLET ORAL
Qty: 30 TABLET | Refills: 1 | Status: SHIPPED | OUTPATIENT
Start: 2022-01-03 | End: 2022-02-02

## 2022-01-03 RX ORDER — AMLODIPINE BESYLATE 10 MG/1
TABLET ORAL
Qty: 30 TABLET | Refills: 1 | Status: SHIPPED | OUTPATIENT
Start: 2022-01-03 | End: 2022-02-02

## 2022-01-10 ENCOUNTER — TELEMEDICINE (OUTPATIENT)
Dept: INTERNAL MEDICINE CLINIC | Facility: CLINIC | Age: 57
End: 2022-01-10
Payer: COMMERCIAL

## 2022-01-10 VITALS — WEIGHT: 250 LBS | HEIGHT: 68 IN | BODY MASS INDEX: 37.89 KG/M2

## 2022-01-10 DIAGNOSIS — U07.1 COVID: Primary | ICD-10-CM

## 2022-01-10 PROCEDURE — 3008F BODY MASS INDEX DOCD: CPT | Performed by: INTERNAL MEDICINE

## 2022-01-10 PROCEDURE — 99213 OFFICE O/P EST LOW 20 MIN: CPT | Performed by: INTERNAL MEDICINE

## 2022-01-10 PROCEDURE — 1036F TOBACCO NON-USER: CPT | Performed by: INTERNAL MEDICINE

## 2022-01-10 NOTE — PROGRESS NOTES
COVID-19 Outpatient Progress Note    Assessment/Plan:    Problem List Items Addressed This Visit     None      Visit Diagnoses     COVID    -  Primary         Disposition:     Patient has COVID-19 infection  Based off CDC guidelines, they were recommended to isolate for 5 days from the date of the positive test  If they remain asymptomatic, isolation may be ended followed by 5 days of wearing a mask when around othes to minimize risk of infecting others  If they have a fever, continue to stay home until fever resolves for at least 24 hours  I recommended continued isolation until at least 24 hours have passed since recovery defined as resolution of fever without the use of fever-reducing medications AND improvement in COVID symptoms AND 10 days have passed since onset of symptoms (or 10 days have passed since date of first positive viral diagnostic test for asymptomatic patients)  I have spent 10 minutes directly with the patient  Greater than 50% of this time was spent in counseling/coordination of care regarding: instructions for management  Encounter provider Arcelia Hess MD    Provider located at [address] [place] [address]  [address] [place] [id number] [phone]    Recent Visits  No visits were found meeting these conditions  Showing recent visits within past 7 days and meeting all other requirements  Today's Visits  Date Type Provider Dept   01/10/22 Ashley Lau MD [facility] [address] today's visits and meeting all other requirements  Future Appointments  No visits were found meeting these conditions  Showing future appointments within next 150 days and meeting all other requirements     This virtual check-in was done via [facility] and patient was informed that this is a secure, HIPAA-compliant platform  He agrees to proceed      Patient agrees to participate in a virtual check in via telephone or video visit instead of presenting to the office to address urgent/immediate medical needs  Patient is aware this is a billable service  After connecting through [facility], the patient was identified by name and date of birth  Ross Perez was informed that this was a telemedicine visit and that the exam was being conducted confidentially over secure lines  My office door was closed  No one else was in the room  Ross Perez acknowledged consent and understanding of privacy and security of the telemedicine visit  I informed the patient that I have reviewed his record in Epic and presented the opportunity for him to ask any questions regarding the visit today  The patient agreed to participate  Verification of patient location:  Patient is located in the following state in which I hold an active license: [place]    Subjective:   Ross Perez is a 62 y o  male who has been screened for COVID-19  Symptom change since last report: improving  Patient's symptoms include fatigue, nasal congestion and cough  Patient denies fever      - Date of symptom onset: 1/6/2022  - Date of positive COVID-19 PCR: 1/7/2022  Type of test: Home antigen    COVID-19 vaccination status: Fully vaccinated (primary series)    Juventino Rg has been staying home and has isolated themselves in his home  He is taking care to not share personal items and is cleaning all surfaces that are touched often, like counters, tabletops, and doorknobs using household cleaning sprays or wipes  He is wearing a mask when he leaves his room       No results found for: Samina Watson, [address] [facility], [facility] [address], [facility]  Past Medical History:   Diagnosis Date    Dyspnea on exertion     last assessed: 3/11/2016    Hypertension     Major depressive disorder, single episode, moderate ([facility] [address] ) 12/19/2016     Past Surgical History:   Procedure Laterality Date    FIBULA FRACTURE SURGERY      HERNIA REPAIR      TONSILLECTOMY       Current Outpatient Medications   Medication Sig Dispense Refill    albuterol (PROVENTIL HFA,VENTOLIN HFA) 90 mcg/act inhaler TAKE 2 PUFFS BY MOUTH EVERY 6 HOURS AS NEEDED FOR WHEEZE 6 7 g 2    amLODIPine (NORVASC) 10 mg tablet TAKE 1 TABLET BY MOUTH EVERY DAY 30 tablet 1    ergocalciferol (VITAMIN D2) 50,000 units Take 1 capsule (50,000 Units total) by mouth once a week 4 capsule 1    escitalopram (LEXAPRO) 10 mg tablet TAKE 1 TABLET BY MOUTH EVERY DAY 30 tablet 1    indomethacin (INDOCIN) 50 mg capsule Take 1 capsule (50 mg total) by mouth 2 (two) times a day with meals for 5 days 10 capsule 0    losartan (COZAAR) 100 MG tablet TAKE 1 TABLET BY MOUTH EVERY DAY 30 tablet 1     No current facility-administered medications for this visit  No Known Allergies    Review of Systems   Constitutional: Positive for fatigue  Negative for fever  HENT: Positive for congestion, sinus pressure and sinus pain  Respiratory: Positive for cough  All other systems reviewed and are negative  Objective:    Vitals:    01/10/22 1150   Weight: 113 kg (250 lb)   Height: 5' 8" (1 727 m)       Physical Exam  Vitals reviewed  Pulmonary:      Effort: Pulmonary effort is normal    Neurological:      Mental Status: He is alert and oriented to person, place, and time  Psychiatric:         Mood and Affect: Mood normal          VIRTUAL VISIT [facility] verbally agrees to participate in [employer]  Pt is aware that [employer] could be limited without vital signs or the ability to perform a full hands-on physical Cullen Ramos understands he or the provider may request at any time to terminate the video visit and request the patient to seek care or treatment in person

## 2022-01-10 NOTE — LETTER
[facility] INTERNAL MEDICINE [place]  [address]  [facility] [address]  [address] [address] [phone]  Dept: [phone]    January 10, 2022    Patient: Donita Mares  YOB: 1965    Donita Mares was seen and evaluated at our [facility]  Lillie Oglesby has tested positive for COVID, he may return to work on 1/13/22, as this is greater than 5 days from the onset of symptoms  Upon return, he must then adhere to strict masking for an additional 5 days      Sincerely,    Ed Priest MD

## 2022-01-22 ENCOUNTER — OFFICE VISIT (OUTPATIENT)
Dept: URGENT CARE | Age: 57
End: 2022-01-22
Payer: COMMERCIAL

## 2022-01-22 VITALS
WEIGHT: 250 LBS | DIASTOLIC BLOOD PRESSURE: 100 MMHG | BODY MASS INDEX: 37.89 KG/M2 | SYSTOLIC BLOOD PRESSURE: 152 MMHG | HEIGHT: 68 IN | TEMPERATURE: 98.6 F | OXYGEN SATURATION: 98 % | HEART RATE: 84 BPM | RESPIRATION RATE: 16 BRPM

## 2022-01-22 DIAGNOSIS — M10.9 ACUTE GOUT INVOLVING TOE OF LEFT FOOT, UNSPECIFIED CAUSE: Primary | ICD-10-CM

## 2022-01-22 PROCEDURE — G0383 LEV 4 HOSP TYPE B ED VISIT: HCPCS

## 2022-01-22 RX ORDER — PREDNISONE 20 MG/1
40 TABLET ORAL DAILY
Qty: 10 TABLET | Refills: 0 | Status: SHIPPED | OUTPATIENT
Start: 2022-01-22 | End: 2022-01-27

## 2022-01-22 RX ORDER — INDOMETHACIN 50 MG/1
50 CAPSULE ORAL 2 TIMES DAILY WITH MEALS
Qty: 10 CAPSULE | Refills: 0 | Status: SHIPPED | OUTPATIENT
Start: 2022-01-22 | End: 2022-03-03 | Stop reason: ALTCHOICE

## 2022-01-22 NOTE — PATIENT INSTRUCTIONS
Gout   AMBULATORY CARE:   Gout  is a form of arthritis that causes severe joint pain and stiffness  Acute gout pain starts suddenly, gets worse quickly, and stops on its own  Acute gout can become chronic and cause permanent damage to your joints  Seek care immediately if:   · You have severe pain in one or more of your joints that you cannot tolerate  · You have a fever or redness that spreads beyond the joint area  Call your doctor if:   · You have new symptoms, such as a rash, after you start gout treatment  · Your joint pain and swelling do not go away, even after treatment  · You are not urinating as much or as often as you usually do  · You have trouble taking your gout medicines  · You have questions or concerns about your condition or care  Stages of gout:   · Hyperuricemia  starts with high levels of uric acid  Hyperuricemia is not gout, but it increases your risk for gout  You may have no symptoms at this stage, and it usually does not need treatment  · Acute gouty arthritis  starts with a sudden attack of pain and swelling, usually in 1 joint  The attack may last from a few days to 2 weeks  · Intercritical gout  is the time between attacks  You may go months or years without another attack  You will not have joint pain or stiffness, but this does not mean your gout is cured  You will still need treatment to prevent chronic gout  · Chronic tophaceous gout  develops if gout is not treated  Large amounts of uric acid crystals, called tophi, collect around your joints  The crystals can destroy or deform the joints  Gout attacks occur more often, and last hours to weeks  More than 1 joint may be painful and swollen  At this stage, gout symptoms do not go away on their own  Medicines: You may need any of the following:  · Prescription pain medicine  may be given  Ask your healthcare provider how to take this medicine safely   Some prescription pain medicines contain acetaminophen  Do not take other medicines that contain acetaminophen without talking to your healthcare provider  Too much acetaminophen may cause liver damage  Prescription pain medicine may cause constipation  Ask your healthcare provider how to prevent or treat constipation  · NSAIDs , such as ibuprofen, help decrease swelling, pain, and fever  This medicine is available with or without a doctor's order  NSAIDs can cause stomach bleeding or kidney problems in certain people  If you take blood thinner medicine, always ask your healthcare provider if NSAIDs are safe for you  Always read the medicine label and follow directions  · Gout medicine  decreases joint pain and swelling  It may also be given to prevent new gout attacks  · Steroids  reduce inflammation and can help your joint stiffness and pain during gout attacks  · Uric acid medicine  may be given to reduce the amount of uric acid your body makes  Some medicines may help you pass more uric acid when you urinate  · Take your medicine as directed  Contact your healthcare provider if you think your medicine is not helping or if you have side effects  Tell him or her if you are allergic to any medicine  Keep a list of the medicines, vitamins, and herbs you take  Include the amounts, and when and why you take them  Bring the list or the pill bottles to follow-up visits  Carry your medicine list with you in case of an emergency  Manage your symptoms:       · Rest your painful joint so it can heal   Your healthcare provider may recommend crutches or a walker if the affected joint is in a leg  · Apply ice to your joint  Ice decreases pain and swelling  Use an ice pack, or put crushed ice in a plastic bag  Cover the ice pack or bag with a towel before you apply it to your painful joint  Apply ice for 15 to 20 minutes every hour, or as directed  · Elevate your joint  Elevation helps reduce swelling and pain   Raise your joint above the level of your heart as often as you can  Prop your painful joint on pillows to keep it above your heart comfortably  · Go to physical therapy if directed  A physical therapist can teach you exercises to improve flexibility and range of motion  Help prevent gout attacks:   · Do not eat high-purine foods  These foods include meats, seafood, asparagus, spinach, cauliflower, and some types of beans  Healthcare providers may tell you to eat more low-fat milk products, such as yogurt  Milk products may decrease your risk for gout attacks  Vitamin C and coffee may also help  Your healthcare provider or dietitian can help you create a meal plan  · Drink liquids as directed  Liquids such as water help remove uric acid from your body  Ask how much liquid to drink each day and which liquids are best for you  · Maintain a healthy weight  Weight loss may decrease the amount of uric acid in your body  Ask your healthcare provider what a healthy weight is for you  Ask him or her to help you create a weight loss plan if you are overweight  · Control your blood sugar level if you have diabetes  Keep your blood sugar level in a normal range  This can help prevent gout attacks  · Limit or do not drink alcohol as directed  Alcohol can trigger a gout attack  Alcohol also increases your risk for dehydration  Ask your healthcare provider if alcohol is safe for you  Follow up with your doctor as directed: You may be referred to a rheumatologist or podiatrist  Write down your questions so you remember to ask them during your visits  © Copyright [employer] 2021 Information is for End User's use only and may not be sold, redistributed or otherwise used for commercial purposes  All illustrations and images included in CareNotes® are the copyrighted property of [employer]  or Jeannine Tay  The above information is an  only   It is not intended as medical advice for individual conditions or treatments  Talk to your doctor, nurse or pharmacist before following any medical regimen to see if it is safe and effective for you

## 2022-01-22 NOTE — PROGRESS NOTES
[address] [employer] [facility]        NAME: Donita Mares is a 62 y o  male  : 1965    MRN: [id number]  DATE: 2022  TIME: 9:00 AM    Assessment and Plan   Acute gout involving toe of left foot, unspecified cause [M10 9]  1  Acute gout involving toe of left foot, unspecified cause  predniSONE 20 mg tablet    indomethacin (INDOCIN) 50 mg capsule         Patient Instructions     Please take prednisone daily x5 days  Please take indocin BID with meals x5 days  Follow up with PCP in 3-5 days  Proceed to  ER if symptoms worsen  Chief Complaint     Chief Complaint   Patient presents with    Foot Pain     Pt c/o pain 5/10 in great toe of left foot that began last night  Hx of gout and feels this is a flare-up  History of Present Illness       Patient presenting with left great toe pain that started this morning  Patient states that he has a history of gout, low with his 1st attack being approximately 4 years ago, and his most recent attack approximately 1 month ago  He states that this occurrence is very similar to his previous gout attacks  He states that he was previously treated with Indocin and prednisone, which he responded well to  He denies any trauma or injury to his left foot  Review of Systems   Review of Systems   Constitutional: Negative for chills and fever  HENT: Negative for ear pain and sore throat  Eyes: Negative for pain and visual disturbance  Respiratory: Negative for cough and shortness of breath  Cardiovascular: Negative for chest pain and palpitations  Gastrointestinal: Negative for abdominal pain and vomiting  Genitourinary: Negative for dysuria and hematuria  Musculoskeletal: Positive for arthralgias and joint swelling  Negative for back pain  Skin: Negative for color change and rash  Neurological: Negative for headaches  All other systems reviewed and are negative          Current Medications       Current Outpatient Medications:    amLODIPine (NORVASC) 10 mg tablet, TAKE 1 TABLET BY MOUTH EVERY DAY, Disp: 30 tablet, Rfl: 1    ergocalciferol (VITAMIN D2) 50,000 units, Take 1 capsule (50,000 Units total) by mouth once a week, Disp: 4 capsule, Rfl: 1    escitalopram (LEXAPRO) 10 mg tablet, TAKE 1 TABLET BY MOUTH EVERY DAY, Disp: 30 tablet, Rfl: 1    losartan (COZAAR) 100 MG tablet, TAKE 1 TABLET BY MOUTH EVERY DAY, Disp: 30 tablet, Rfl: 1    albuterol (PROVENTIL HFA,VENTOLIN HFA) 90 mcg/act inhaler, TAKE 2 PUFFS BY MOUTH EVERY 6 HOURS AS NEEDED FOR WHEEZE (Patient not taking: Reported on 1/22/2022), Disp: 6 7 g, Rfl: 2    indomethacin (INDOCIN) 50 mg capsule, Take 1 capsule (50 mg total) by mouth 2 (two) times a day with meals for 5 days, Disp: 10 capsule, Rfl: 0    predniSONE 20 mg tablet, Take 2 tablets (40 mg total) by mouth daily for 5 days, Disp: 10 tablet, Rfl: 0    Current Allergies     Allergies as of 01/22/2022    (No Known Allergies)            The following portions of the patient's history were reviewed and updated as appropriate: allergies, current medications, past family history, past medical history, past social history, past surgical history and problem list      Past Medical History:   Diagnosis Date    Dyspnea on exertion     last assessed: 3/11/2016    Hypertension     Major depressive disorder, single episode, moderate ([address] ) 12/19/2016       Past Surgical History:   Procedure Laterality Date    FIBULA FRACTURE SURGERY      HERNIA REPAIR      TONSILLECTOMY         Family History   Problem Relation Age of Onset    Leukemia Mother         acute myeloid    Hypertension Mother     Multiple myeloma Mother     Diabetes Father         mellitus    Hypertension Father     Pneumonia Father         NSIP (nonspecific interstital pneumonia)         Medications have been verified          Objective   /100 (BP Location: Left arm, Patient Position: Sitting)   Pulse 84   Temp 98 6 °F (37 °C)   Resp 16   Ht 5' 8" (1 727 m)   Wt 113 kg (250 lb)   SpO2 98%   BMI 38 01 kg/m²        Physical Exam     Physical Exam  Vitals and nursing note reviewed  Constitutional:       General: He is not in acute distress  Appearance: Normal appearance  He is not ill-appearing  HENT:      Head: Normocephalic and atraumatic  Right Ear: Tympanic membrane normal       Left Ear: Tympanic membrane normal       Nose: Nose normal  No congestion or rhinorrhea  Mouth/Throat:      Mouth: Mucous membranes are moist       Pharynx: Oropharynx is clear  No oropharyngeal exudate or posterior oropharyngeal erythema  Eyes:      Extraocular Movements: Extraocular movements intact  Conjunctiva/sclera: Conjunctivae normal       Pupils: Pupils are equal, round, and reactive to light  Cardiovascular:      Rate and Rhythm: Normal rate and regular rhythm  Pulses: Normal pulses  Heart sounds: Normal heart sounds  No murmur heard  No friction rub  No gallop  Pulmonary:      Effort: Pulmonary effort is normal  No respiratory distress  Breath sounds: Normal breath sounds  No stridor  No wheezing, rhonchi or rales  Abdominal:      General: Bowel sounds are normal       Palpations: Abdomen is soft  Tenderness: There is no abdominal tenderness  Musculoskeletal:         General: Swelling and tenderness present  No signs of injury  Normal range of motion  Cervical back: Normal range of motion and neck supple  Right foot: Normal       Left foot: Swelling and tenderness present  Legs:    Skin:     General: Skin is warm and dry  Capillary Refill: Capillary refill takes less than 2 seconds  Neurological:      General: No focal deficit present  Mental Status: He is alert and oriented to person, place, and time     Psychiatric:         Mood and Affect: Mood normal          Behavior: Behavior normal

## 2022-02-01 DIAGNOSIS — E55.9 VITAMIN D DEFICIENCY: ICD-10-CM

## 2022-02-01 RX ORDER — ERGOCALCIFEROL 1.25 MG/1
CAPSULE ORAL
Qty: 4 CAPSULE | Refills: 1 | Status: SHIPPED | OUTPATIENT
Start: 2022-02-01 | End: 2022-04-05

## 2022-02-02 DIAGNOSIS — I10 BENIGN HYPERTENSION: ICD-10-CM

## 2022-02-02 DIAGNOSIS — I10 ESSENTIAL HYPERTENSION: ICD-10-CM

## 2022-02-02 DIAGNOSIS — F32.1 MAJOR DEPRESSIVE DISORDER, SINGLE EPISODE, MODERATE (HCC): ICD-10-CM

## 2022-02-02 RX ORDER — ESCITALOPRAM OXALATE 10 MG/1
TABLET ORAL
Qty: 30 TABLET | Refills: 1 | Status: SHIPPED | OUTPATIENT
Start: 2022-02-02 | End: 2022-03-07

## 2022-02-02 RX ORDER — AMLODIPINE BESYLATE 10 MG/1
TABLET ORAL
Qty: 30 TABLET | Refills: 1 | Status: SHIPPED | OUTPATIENT
Start: 2022-02-02 | End: 2022-03-07

## 2022-02-02 RX ORDER — LOSARTAN POTASSIUM 100 MG/1
TABLET ORAL
Qty: 30 TABLET | Refills: 1 | Status: SHIPPED | OUTPATIENT
Start: 2022-02-02 | End: 2022-03-07

## 2022-02-02 NOTE — TELEPHONE ENCOUNTER
This gentleman needs a follow-up appointment, he has not been seen in some time in the office  He will not get further refills without follow-up visit

## 2022-02-28 ENCOUNTER — APPOINTMENT (OUTPATIENT)
Dept: LAB | Facility: CLINIC | Age: 57
End: 2022-02-28
Payer: COMMERCIAL

## 2022-02-28 DIAGNOSIS — R73.01 IMPAIRED FASTING GLUCOSE: ICD-10-CM

## 2022-02-28 DIAGNOSIS — E78.5 ELEVATED LIPIDS: ICD-10-CM

## 2022-02-28 DIAGNOSIS — I10 BENIGN HYPERTENSION: ICD-10-CM

## 2022-02-28 DIAGNOSIS — M10.9 GOUTY ARTHRITIS OF RIGHT GREAT TOE: ICD-10-CM

## 2022-02-28 LAB
ALBUMIN SERPL BCP-MCNC: 3.6 G/DL (ref 3.5–5)
ALP SERPL-CCNC: 96 U/L (ref 46–116)
ALT SERPL W P-5'-P-CCNC: 30 U/L (ref 12–78)
ANION GAP SERPL CALCULATED.3IONS-SCNC: 6 MMOL/L (ref 4–13)
AST SERPL W P-5'-P-CCNC: 27 U/L (ref 5–45)
BASOPHILS # BLD AUTO: 0.04 THOUSANDS/ΜL (ref 0–0.1)
BASOPHILS NFR BLD AUTO: 1 % (ref 0–1)
BILIRUB SERPL-MCNC: 0.49 MG/DL (ref 0.2–1)
BUN SERPL-MCNC: 23 MG/DL (ref 5–25)
CALCIUM SERPL-MCNC: 9 MG/DL (ref 8.3–10.1)
CHLORIDE SERPL-SCNC: 105 MMOL/L (ref 100–108)
CHOLEST SERPL-MCNC: 253 MG/DL
CO2 SERPL-SCNC: 29 MMOL/L (ref 21–32)
CREAT SERPL-MCNC: 1.54 MG/DL (ref 0.6–1.3)
EOSINOPHIL # BLD AUTO: 0.4 THOUSAND/ΜL (ref 0–0.61)
EOSINOPHIL NFR BLD AUTO: 8 % (ref 0–6)
ERYTHROCYTE [DISTWIDTH] IN BLOOD BY AUTOMATED COUNT: 13.2 % (ref 11.6–15.1)
GFR SERPL CREATININE-BSD FRML MDRD: 49 ML/MIN/1.73SQ M
GLUCOSE P FAST SERPL-MCNC: 119 MG/DL (ref 65–99)
HCT VFR BLD AUTO: 42.7 % (ref 36.5–49.3)
HDLC SERPL-MCNC: 84 MG/DL
HGB BLD-MCNC: 14.2 G/DL (ref 12–17)
IMM GRANULOCYTES # BLD AUTO: 0.04 THOUSAND/UL (ref 0–0.2)
IMM GRANULOCYTES NFR BLD AUTO: 1 % (ref 0–2)
LDLC SERPL CALC-MCNC: 148 MG/DL (ref 0–100)
LYMPHOCYTES # BLD AUTO: 1.28 THOUSANDS/ΜL (ref 0.6–4.47)
LYMPHOCYTES NFR BLD AUTO: 26 % (ref 14–44)
MCH RBC QN AUTO: 30.7 PG (ref 26.8–34.3)
MCHC RBC AUTO-ENTMCNC: 33.3 G/DL (ref 31.4–37.4)
MCV RBC AUTO: 92 FL (ref 82–98)
MONOCYTES # BLD AUTO: 0.76 THOUSAND/ΜL (ref 0.17–1.22)
MONOCYTES NFR BLD AUTO: 16 % (ref 4–12)
NEUTROPHILS # BLD AUTO: 2.35 THOUSANDS/ΜL (ref 1.85–7.62)
NEUTS SEG NFR BLD AUTO: 48 % (ref 43–75)
NONHDLC SERPL-MCNC: 169 MG/DL
NRBC BLD AUTO-RTO: 0 /100 WBCS
PLATELET # BLD AUTO: 439 THOUSANDS/UL (ref 149–390)
PMV BLD AUTO: 10.2 FL (ref 8.9–12.7)
POTASSIUM SERPL-SCNC: 4 MMOL/L (ref 3.5–5.3)
PROT SERPL-MCNC: 8.2 G/DL (ref 6.4–8.2)
RBC # BLD AUTO: 4.62 MILLION/UL (ref 3.88–5.62)
SODIUM SERPL-SCNC: 140 MMOL/L (ref 136–145)
TRIGL SERPL-MCNC: 104 MG/DL
URATE SERPL-MCNC: 9.3 MG/DL (ref 4.2–8)
WBC # BLD AUTO: 4.87 THOUSAND/UL (ref 4.31–10.16)

## 2022-02-28 PROCEDURE — 80053 COMPREHEN METABOLIC PANEL: CPT

## 2022-02-28 PROCEDURE — 85025 COMPLETE CBC W/AUTO DIFF WBC: CPT

## 2022-02-28 PROCEDURE — 83036 HEMOGLOBIN GLYCOSYLATED A1C: CPT

## 2022-02-28 PROCEDURE — 80061 LIPID PANEL: CPT

## 2022-02-28 PROCEDURE — 36415 COLL VENOUS BLD VENIPUNCTURE: CPT

## 2022-02-28 PROCEDURE — 84550 ASSAY OF BLOOD/URIC ACID: CPT

## 2022-03-01 LAB
EST. AVERAGE GLUCOSE BLD GHB EST-MCNC: 131 MG/DL
HBA1C MFR BLD: 6.2 %

## 2022-03-03 ENCOUNTER — OFFICE VISIT (OUTPATIENT)
Dept: INTERNAL MEDICINE CLINIC | Facility: CLINIC | Age: 57
End: 2022-03-03
Payer: COMMERCIAL

## 2022-03-03 VITALS
OXYGEN SATURATION: 97 % | HEIGHT: 68 IN | TEMPERATURE: 98.3 F | SYSTOLIC BLOOD PRESSURE: 120 MMHG | HEART RATE: 88 BPM | BODY MASS INDEX: 38.92 KG/M2 | DIASTOLIC BLOOD PRESSURE: 80 MMHG | WEIGHT: 256.8 LBS

## 2022-03-03 DIAGNOSIS — Z00.00 ANNUAL PHYSICAL EXAM: Primary | ICD-10-CM

## 2022-03-03 DIAGNOSIS — M10.9 GOUT, UNSPECIFIED CAUSE, UNSPECIFIED CHRONICITY, UNSPECIFIED SITE: ICD-10-CM

## 2022-03-03 DIAGNOSIS — R73.03 PREDIABETES: ICD-10-CM

## 2022-03-03 DIAGNOSIS — D75.839 THROMBOCYTOSIS: ICD-10-CM

## 2022-03-03 DIAGNOSIS — F32.1 MAJOR DEPRESSIVE DISORDER, SINGLE EPISODE, MODERATE (HCC): ICD-10-CM

## 2022-03-03 DIAGNOSIS — N52.9 ERECTILE DYSFUNCTION, UNSPECIFIED ERECTILE DYSFUNCTION TYPE: ICD-10-CM

## 2022-03-03 DIAGNOSIS — Z12.5 SCREENING FOR PROSTATE CANCER: ICD-10-CM

## 2022-03-03 DIAGNOSIS — E78.2 MIXED HYPERLIPIDEMIA: ICD-10-CM

## 2022-03-03 DIAGNOSIS — I10 BENIGN HYPERTENSION: ICD-10-CM

## 2022-03-03 PROBLEM — F33.9 DEPRESSION, RECURRENT (HCC): Status: ACTIVE | Noted: 2022-03-03

## 2022-03-03 PROCEDURE — 3725F SCREEN DEPRESSION PERFORMED: CPT | Performed by: PHYSICIAN ASSISTANT

## 2022-03-03 PROCEDURE — 3008F BODY MASS INDEX DOCD: CPT | Performed by: PHYSICIAN ASSISTANT

## 2022-03-03 PROCEDURE — 93000 ELECTROCARDIOGRAM COMPLETE: CPT | Performed by: PHYSICIAN ASSISTANT

## 2022-03-03 PROCEDURE — 3079F DIAST BP 80-89 MM HG: CPT | Performed by: PHYSICIAN ASSISTANT

## 2022-03-03 PROCEDURE — 3074F SYST BP LT 130 MM HG: CPT | Performed by: PHYSICIAN ASSISTANT

## 2022-03-03 PROCEDURE — 1036F TOBACCO NON-USER: CPT | Performed by: PHYSICIAN ASSISTANT

## 2022-03-03 PROCEDURE — 99396 PREV VISIT EST AGE 40-64: CPT | Performed by: PHYSICIAN ASSISTANT

## 2022-03-03 RX ORDER — TADALAFIL 10 MG/1
10 TABLET ORAL DAILY PRN
Qty: 10 TABLET | Refills: 2 | Status: SHIPPED | OUTPATIENT
Start: 2022-03-03 | End: 2022-03-07 | Stop reason: SDUPTHER

## 2022-03-03 RX ORDER — ALLOPURINOL 100 MG/1
100 TABLET ORAL DAILY
Qty: 30 TABLET | Refills: 5 | Status: SHIPPED | OUTPATIENT
Start: 2022-03-03 | End: 2022-04-19 | Stop reason: SDUPTHER

## 2022-03-03 NOTE — PROGRESS NOTES
Assessment/Plan:   Patient Instructions   General medical exam is good other than weight  He is to continue to exercise, reduce his intake of concentrated sweets and reduce his portion sizes of carbohydrates  We will start allopurinol 100 mg 1 daily for gout prophylaxis  No other medication changes other than allowing patient to use Cialis 10 mg as needed for ED  Recommend follow-up in 6 months, sooner as needed  Last be done for that visit  Quality Measures: BMI Counseling: Body mass index is 39 05 kg/m²  The BMI is above normal  Nutrition recommendations include decreasing portion sizes, encouraging healthy choices of fruits and vegetables, consuming healthier snacks, moderation in carbohydrate intake and reducing intake of cholesterol  Exercise recommendations include exercising 3-5 times per week  Rationale for BMI follow-up plan is due to patient being overweight or obese  Return in about 6 months (around 9/3/2022) for Next scheduled follow up  Diagnoses and all orders for this visit:    Annual physical exam    Benign hypertension  -     CBC and differential; Future  -     POCT ECG    Major depressive disorder, single episode, moderate (HCC)    Prediabetes  -     Hemoglobin A1C; Future    Mixed hyperlipidemia  -     Comprehensive metabolic panel; Future  -     Lipid panel; Future    Thrombocytosis  -     CBC and differential; Future    Gout, unspecified cause, unspecified chronicity, unspecified site  -     allopurinol (ZYLOPRIM) 100 mg tablet; Take 1 tablet (100 mg total) by mouth daily  -     Uric acid; Future    Erectile dysfunction, unspecified erectile dysfunction type  -     tadalafil (CIALIS) 10 MG tablet; Take 1 tablet (10 mg total) by mouth daily as needed for erectile dysfunction    Dep[facility] ([facility] )    Screening for prostate cancer  -     PSA, Total Screen; Future          Subjective:      Patient ID: Charity Valle is a 62 y o  male        43-year-old male presents for his annual physical   Has not been seen it in almost 1 year  Labs reviewed with patient  Hypertension:  Currently on amlodipine and losartan  Blood pressure well controlled  [place] cp, palp, sob, edema, HA, dizziness, diaphoresis, syncope, visual disturbance  Gout:  Has had 2 urgent care visits for acute episodes of gout  Patient is currently asymptomatic however uric acid level is elevated  At 9 3  We discussed initiation of allopurinol and he is in agreement  Hyperlipidemia:  Elevated total cholesterol and LDL  Very good HDL cholesterol  Have agreed to lifestyle modifications in strong diet modifications with decrease intake of cholesterol rich foods  Prediabetes:  A1c 6 2 with fasting sugar of 119  Denies polyuria, polyphagia, polydipsia  Discussed importance of diet modification and weight loss  Encouraged reduction in intake of concentrated sweets and portion sizes of carbohydrates  ED:  Patient states she does not notice it at all times but is becoming more frequent  Depression:  Markedly improved with the escitalopram   Discussed considering tapering off at some point, however patient does not wish to do so at this time  Thrombosis noted on CBC:  Will continue to monitor  No complaint of bruising  No other acute concerns        ALLERGIES:  No Known Allergies    CURRENT MEDICATIONS:    Current Outpatient Medications:     albuterol (PROVENTIL HFA,VENTOLIN HFA) 90 mcg/act inhaler, TAKE 2 PUFFS BY MOUTH EVERY 6 HOURS AS NEEDED FOR WHEEZE, Disp: 6 7 g, Rfl: 2    amLODIPine (NORVASC) 10 mg tablet, TAKE 1 TABLET BY MOUTH EVERY DAY, Disp: 30 tablet, Rfl: 1    ergocalciferol (VITAMIN D2) 50,000 units, TAKE 1 CAPSULE BY MOUTH ONE TIME PER WEEK, Disp: 4 capsule, Rfl: 1    escitalopram (LEXAPRO) 10 mg tablet, TAKE 1 TABLET BY MOUTH EVERY DAY, Disp: 30 tablet, Rfl: 1    losartan (COZAAR) 100 MG tablet, TAKE 1 TABLET BY MOUTH EVERY DAY, Disp: 30 tablet, Rfl: 1   allopurinol (ZYLOPRIM) 100 mg tablet, Take 1 tablet (100 mg total) by mouth daily, Disp: 30 tablet, Rfl: 5    tadalafil (CIALIS) 10 MG tablet, Take 1 tablet (10 mg total) by mouth daily as needed for erectile dysfunction, Disp: 10 tablet, Rfl: 2    ACTIVE PROBLEM LIST:  Patient Active Problem List   Diagnosis    Benign hypertension    Elevated lipids    LVH (left ventricular hypertrophy)    Major depressive disorder, single episode, moderate (HCC)    Obesity    Vitamin D deficiency    Chronic pain of right knee    Sleeping difficulty    Gouty arthritis of right great toe    Left foot pain    Gout    Prediabetes    Mixed hyperlipidemia    [facility]       PAST MEDICAL HISTORY:  Past Medical History:   Diagnosis Date    Dyspnea on exertion     last assessed: 3/11/2016    Hypertension     Major depressive disorder, single episode, moderate ([facility] ) 12/19/2016       PAST SURGICAL HISTORY:  Past Surgical History:   Procedure Laterality Date    FIBULA FRACTURE SURGERY      HERNIA REPAIR      TONSILLECTOMY         FAMILY HISTORY:  Family History   Problem Relation Age of Onset    Leukemia Mother         acute myeloid    Hypertension Mother     Multiple myeloma Mother     Diabetes Father         mellitus    Hypertension Father     Pneumonia Father         NSIP (nonspecific interstital pneumonia)       SOCIAL HISTORY:  Social History     Socioeconomic History    Marital status: Legally      Spouse name: Not on file    Number of children: 0    Years of education: Not on file    Highest education level: Not on file   Occupational History    Occupation:  for [employer]     Comment: full-time employment   Tobacco Use    Smoking status: Never Smoker    Smokeless tobacco: Never Used   Substance and Sexual Activity    Alcohol use: Not Currently     Comment: no alchol use (as per allscripts)    Drug use: No    Sexual activity: Not on file   Other Topics Concern    Not on file   Social History Narrative    Brushes teeth twice a day    Dental care, regularly    Exercise: walking         Social Determinants of Health     Financial Resource Strain: Not on file   Food Insecurity: Not on file   Transportation Needs: Not on file   Physical Activity: Not on file   Stress: Not on file   Social Connections: Not on file   Intimate Partner Violence: Not on file   Housing Stability: Not on file       Review of Systems   Constitutional: Negative for activity change, chills, fatigue and fever  HENT: Negative for congestion  Eyes: Negative for discharge  Respiratory: Negative for cough, chest tightness and shortness of breath  Cardiovascular: Negative for chest pain, palpitations and leg swelling  Gastrointestinal: Negative for abdominal pain, blood in stool, constipation, diarrhea, nausea and vomiting  Endocrine: Negative for polydipsia, polyphagia and polyuria  Genitourinary: Negative for difficulty urinating  Musculoskeletal: Negative for arthralgias and myalgias  Skin: Negative for rash  Allergic/Immunologic: Negative for immunocompromised state  Neurological: Negative for dizziness, syncope, weakness, light-headedness and headaches  Hematological: Negative for adenopathy  Does not bruise/bleed easily  Psychiatric/Behavioral: Negative for dysphoric mood, sleep disturbance and suicidal ideas  The patient is not nervous/anxious  Objective:  Vitals:    03/03/22 1501 03/03/22 1525   BP: 130/98 120/80   BP Location: Left arm Left arm   Patient Position: Sitting Sitting   Cuff Size: Large    Pulse: 88    Temp: 98 3 °F (36 8 °C)    TempSrc: Tympanic    SpO2: 97%    Weight: 116 kg (256 lb 12 8 oz)    Height: 5' 8" (1 727 m)      Body mass index is 39 05 kg/m²  Physical Exam  Vitals and nursing note reviewed  Constitutional:       General: He is not in acute distress  Appearance: Normal appearance  He is well-developed  He is obese     HENT: Head: Normocephalic  Right Ear: Ear canal and external ear normal  There is impacted cerumen  Left Ear: Ear canal and external ear normal  There is impacted cerumen  Nose: Nose normal       Mouth/Throat:      Mouth: Mucous membranes are moist       Pharynx: Oropharynx is clear  No oropharyngeal exudate  Eyes:      General: No scleral icterus  Right eye: No discharge  Left eye: No discharge  Extraocular Movements: Extraocular movements intact  Conjunctiva/sclera: Conjunctivae normal       Pupils: Pupils are equal, round, and reactive to light  Neck:      Thyroid: No thyromegaly  Vascular: No carotid bruit or JVD  Trachea: No tracheal deviation  Cardiovascular:      Rate and Rhythm: Normal rate and regular rhythm  Pulses: Normal pulses  Heart sounds: Normal heart sounds  No murmur heard  No friction rub  No gallop  Pulmonary:      Effort: Pulmonary effort is normal  No respiratory distress  Breath sounds: Normal breath sounds  No wheezing or rales  Chest:      Chest wall: No tenderness  Abdominal:      General: Bowel sounds are normal  There is no distension  Palpations: Abdomen is soft  There is no hepatomegaly, splenomegaly or mass  Tenderness: There is no abdominal tenderness  There is no right CVA tenderness, left CVA tenderness, guarding or rebound  Hernia: A hernia is present  Hernia is present in the umbilical area  Comments: Obese   Genitourinary:     Comments: Circumcised adult male  No lesions of the phallus, scrotum, or testes  No inguinal hernias  Rectal exam:  Normal rectal tone  Prostate normal size shape and consistency without dominant nodules  Hemoccult negative  Musculoskeletal:         General: No tenderness or deformity  Normal range of motion  Cervical back: Normal range of motion and neck supple  Right lower leg: No edema  Left lower leg: No edema     Lymphadenopathy: Cervical: No cervical adenopathy  Skin:     General: Skin is warm and dry  Findings: No rash  Neurological:      General: No focal deficit present  Mental Status: He is alert and oriented to person, place, and time  Cranial Nerves: No cranial nerve deficit  Sensory: No sensory deficit  Motor: No weakness or abnormal muscle tone  Coordination: Coordination normal       Gait: Gait normal       Deep Tendon Reflexes: Reflexes are normal and symmetric  Reflexes normal    Psychiatric:         Mood and Affect: Mood normal          Behavior: Behavior normal          Thought Content:  Thought content normal          Judgment: Judgment normal            RESULTS:    Recent Results (from the past 1008 hour(s))   CBC and differential    Collection Time: 02/28/22  7:03 AM   Result Value Ref Range    WBC 4 87 4 31 - 10 16 Thousand/uL    RBC 4 62 3 88 - 5 62 Million/uL    Hemoglobin 14 2 12 0 - 17 0 g/dL    Hematocrit 42 7 36 5 - 49 3 %    MCV 92 82 - 98 fL    MCH 30 7 26 8 - 34 3 pg    MCHC 33 3 31 4 - 37 4 g/dL    RDW 13 2 11 6 - 15 1 %    MPV 10 2 8 9 - 12 7 fL    Platelets 678 (H) 764 - 390 Thousands/uL    nRBC 0 /100 WBCs    Neutrophils Relative 48 43 - 75 %    Immat GRANS % 1 0 - 2 %    Lymphocytes Relative 26 14 - 44 %    Monocytes Relative 16 (H) 4 - 12 %    Eosinophils Relative 8 (H) 0 - 6 %    Basophils Relative 1 0 - 1 %    Neutrophils Absolute 2 35 1 85 - 7 62 Thousands/µL    Immature Grans Absolute 0 04 0 00 - 0 20 Thousand/uL    Lymphocytes Absolute 1 28 0 60 - 4 47 Thousands/µL    Monocytes Absolute 0 76 0 17 - 1 22 Thousand/µL    Eosinophils Absolute 0 40 0 00 - 0 61 Thousand/µL    Basophils Absolute 0 04 0 00 - 0 10 Thousands/µL   Comprehensive metabolic panel    Collection Time: 02/28/22  7:03 AM   Result Value Ref Range    Sodium 140 136 - 145 mmol/L    Potassium 4 0 3 5 - 5 3 mmol/L    Chloride 105 100 - 108 mmol/L    CO2 29 21 - 32 mmol/L    ANION GAP 6 4 - 13 mmol/L    BUN 23 5 - 25 mg/dL    Creatinine 1 54 (H) 0 60 - 1 30 mg/dL    Glucose, Fasting 119 (H) 65 - 99 mg/dL    Calcium 9 0 8 3 - 10 1 mg/dL    AST 27 5 - 45 U/L    ALT 30 12 - 78 U/L    Alkaline Phosphatase 96 46 - 116 U/L    Total Protein 8 2 6 4 - 8 2 g/dL    Albumin 3 6 3 5 - 5 0 g/dL    Total Bilirubin 0 49 0 20 - 1 00 mg/dL    eGFR 49 ml/min/1 73sq m   Lipid panel    Collection Time: 02/28/22  7:03 AM   Result Value Ref Range    Cholesterol 253 (H) See Comment mg/dL    Triglycerides 104 See Comment mg/dL    HDL, Direct 84 >=40 mg/dL    LDL Calculated 148 (H) 0 - 100 mg/dL    Non-HDL-Chol (CHOL-HDL) 169 mg/dl   Hemoglobin A1C    Collection Time: 02/28/22  7:03 AM   Result Value Ref Range    Hemoglobin A1C 6 2 (H) Normal 3 8-5 6%; PreDiabetic 5 7-6 4%; Diabetic >=6 5%; Glycemic control for adults with diabetes <7 0% %     mg/dl   Uric acid    Collection Time: 02/28/22  7:03 AM   Result Value Ref Range    Uric Acid 9 3 (H) 4 2 - 8 0 mg/dL       This note was created with voice recognition software  Phonic, grammatical and spelling errors may be present within the note as a result

## 2022-03-03 NOTE — PATIENT INSTRUCTIONS
General medical exam is good other than weight  He is to continue to exercise, reduce his intake of concentrated sweets and reduce his portion sizes of carbohydrates  We will start allopurinol 100 mg 1 daily for gout prophylaxis  No other medication changes other than allowing patient to use Cialis 10 mg as needed for ED  Recommend follow-up in 6 months, sooner as needed  Last be done for that visit

## 2022-03-05 DIAGNOSIS — I10 ESSENTIAL HYPERTENSION: ICD-10-CM

## 2022-03-05 DIAGNOSIS — F32.1 MAJOR DEPRESSIVE DISORDER, SINGLE EPISODE, MODERATE (HCC): ICD-10-CM

## 2022-03-05 DIAGNOSIS — I10 BENIGN HYPERTENSION: ICD-10-CM

## 2022-03-07 RX ORDER — ESCITALOPRAM OXALATE 10 MG/1
TABLET ORAL
Qty: 30 TABLET | Refills: 5 | Status: SHIPPED | OUTPATIENT
Start: 2022-03-07

## 2022-03-07 RX ORDER — AMLODIPINE BESYLATE 10 MG/1
TABLET ORAL
Qty: 30 TABLET | Refills: 5 | Status: SHIPPED | OUTPATIENT
Start: 2022-03-07

## 2022-03-07 RX ORDER — LOSARTAN POTASSIUM 100 MG/1
TABLET ORAL
Qty: 30 TABLET | Refills: 5 | Status: SHIPPED | OUTPATIENT
Start: 2022-03-07

## 2022-03-11 DIAGNOSIS — J98.01 BRONCHOSPASM: ICD-10-CM

## 2022-03-11 RX ORDER — ALBUTEROL SULFATE 90 UG/1
2 AEROSOL, METERED RESPIRATORY (INHALATION) EVERY 6 HOURS PRN
Qty: 6.7 G | Refills: 3 | Status: SHIPPED | OUTPATIENT
Start: 2022-03-11

## 2022-04-05 DIAGNOSIS — E55.9 VITAMIN D DEFICIENCY: ICD-10-CM

## 2022-04-05 RX ORDER — ERGOCALCIFEROL 1.25 MG/1
CAPSULE ORAL
Qty: 4 CAPSULE | Refills: 1 | Status: SHIPPED | OUTPATIENT
Start: 2022-04-05 | End: 2022-06-03

## 2022-04-29 ENCOUNTER — OFFICE VISIT (OUTPATIENT)
Dept: INTERNAL MEDICINE CLINIC | Facility: CLINIC | Age: 57
End: 2022-04-29
Payer: COMMERCIAL

## 2022-04-29 VITALS
TEMPERATURE: 98 F | BODY MASS INDEX: 39.25 KG/M2 | RESPIRATION RATE: 16 BRPM | SYSTOLIC BLOOD PRESSURE: 164 MMHG | WEIGHT: 259 LBS | HEART RATE: 92 BPM | HEIGHT: 68 IN | DIASTOLIC BLOOD PRESSURE: 100 MMHG | OXYGEN SATURATION: 97 %

## 2022-04-29 DIAGNOSIS — M77.02 MEDIAL EPICONDYLITIS OF ELBOW, LEFT: Primary | ICD-10-CM

## 2022-04-29 DIAGNOSIS — M54.6 ACUTE RIGHT-SIDED THORACIC BACK PAIN: ICD-10-CM

## 2022-04-29 DIAGNOSIS — M25.562 MEDIAL KNEE PAIN, LEFT: ICD-10-CM

## 2022-04-29 PROCEDURE — 99213 OFFICE O/P EST LOW 20 MIN: CPT | Performed by: PHYSICIAN ASSISTANT

## 2022-04-29 PROCEDURE — 1036F TOBACCO NON-USER: CPT | Performed by: PHYSICIAN ASSISTANT

## 2022-04-29 PROCEDURE — 3080F DIAST BP >= 90 MM HG: CPT | Performed by: PHYSICIAN ASSISTANT

## 2022-04-29 PROCEDURE — 3725F SCREEN DEPRESSION PERFORMED: CPT | Performed by: PHYSICIAN ASSISTANT

## 2022-04-29 PROCEDURE — 3008F BODY MASS INDEX DOCD: CPT | Performed by: PHYSICIAN ASSISTANT

## 2022-04-29 PROCEDURE — 3077F SYST BP >= 140 MM HG: CPT | Performed by: PHYSICIAN ASSISTANT

## 2022-04-29 RX ORDER — MELOXICAM 15 MG/1
15 TABLET ORAL DAILY
Qty: 30 TABLET | Refills: 5 | Status: SHIPPED | OUTPATIENT
Start: 2022-04-29

## 2022-04-29 NOTE — LETTER
April 29, 2022     Patient: Lesly Falcon  YOB: 1965  Date of Visit: 4/29/2022      To Whom it May Concern:    Sandra Salgado is under my professional care  Suzie Bell was seen in my office on 4/29/2022  Suzie Bell may return to work on 4/3/22  If you have any questions or concerns, please don't hesitate to call           Sincerely,          Mehdi Pérez PA-C        CC: No Recipients

## 2022-04-29 NOTE — PATIENT INSTRUCTIONS
Current aches and pains are related to overuse conditions  Will give trial of anti-inflammatory meloxicam 15 mg daily  Patient needs to stay hydrated  Follow-up as scheduled, sooner as needed  As we discussed it may benefit you by wearing a knee brace,  Back lifting brace, and epicondyle band for the left elbow  Proper body mechanics encouraged

## 2022-04-29 NOTE — PROGRESS NOTES
Assessment/Plan:   Patient Instructions   Current aches and pains are related to overuse conditions  Will give trial of anti-inflammatory meloxicam 15 mg daily  Patient needs to stay hydrated  Follow-up as scheduled, sooner as needed  As we discussed it may benefit you by wearing a knee brace,  Back lifting brace, and epicondyle band for the left elbow  Proper body mechanics encouraged  Quality Measures:       Return for Next scheduled follow up  Diagnoses and all orders for this visit:    Medial epicondylitis of elbow, left  -     meloxicam (Mobic) 15 mg tablet; Take 1 tablet (15 mg total) by mouth daily    Medial knee pain, left  -     meloxicam (Mobic) 15 mg tablet; Take 1 tablet (15 mg total) by mouth daily    Acute right-sided thoracic back pain  -     meloxicam (Mobic) 15 mg tablet; Take 1 tablet (15 mg total) by mouth daily          Subjective:      Patient ID: Nacho Boston is a 62 y o  male  Acute visit    Patient has taken a job at a [employer] on the [employer] for which she is doing a lot of twisting and lifting  Since doing that he has developed medial left elbow pain, medial left knee pain, and right mid back pain  Aches and pains tend to worsen during work, and calmed down slightly when being rested  He is overweight and out of shape for this kind of job  He did tell me he needed to get employment to pay his mortgage after he retired from his last job  He is actively looking for a more sedate job  These appear to be overuse injuries        ALLERGIES:  No Known Allergies    CURRENT MEDICATIONS:    Current Outpatient Medications:     albuterol (PROVENTIL HFA,VENTOLIN HFA) 90 mcg/act inhaler, Inhale 2 puffs every 6 (six) hours as needed for wheezing, Disp: 6 7 g, Rfl: 3    allopurinol (ZYLOPRIM) 100 mg tablet, Take 2 tablets (200 mg total) by mouth daily, Disp: 60 tablet, Rfl: 5    amLODIPine (NORVASC) 10 mg tablet, TAKE 1 TABLET BY MOUTH EVERY DAY, Disp: 30 tablet, Rfl: 5    ergocalciferol (VITAMIN D2) 50,000 units, TAKE 1 CAPSULE BY MOUTH ONE TIME PER WEEK, Disp: 4 capsule, Rfl: 1    escitalopram (LEXAPRO) 10 mg tablet, TAKE 1 TABLET BY MOUTH EVERY DAY, Disp: 30 tablet, Rfl: 5    losartan (COZAAR) 100 MG tablet, TAKE 1 TABLET BY MOUTH EVERY DAY, Disp: 30 tablet, Rfl: 5    tadalafil (CIALIS) 10 MG tablet, Take 1 tablet (10 mg total) by mouth daily as needed for erectile dysfunction, Disp: 10 tablet, Rfl: 2    meloxicam (Mobic) 15 mg tablet, Take 1 tablet (15 mg total) by mouth daily, Disp: 30 tablet, Rfl: 5    ACTIVE PROBLEM LIST:  Patient Active Problem List   Diagnosis    Benign hypertension    Elevated lipids    LVH (left ventricular hypertrophy)    Major depressive disorder, single episode, moderate (HCC)    Obesity    Vitamin D deficiency    Chronic pain of right knee    Sleeping difficulty    Gouty arthritis of right great toe    Left foot pain    Gout    Prediabetes    Mixed hyperlipidemia    Depression, recurrent (HCC)       PAST MEDICAL HISTORY:  Past Medical History:   Diagnosis Date    Dyspnea on exertion     last assessed: 3/11/2016    Hypertension     Major depressive disorder, single episode, moderate (HCC) 12/19/2016       PAST SURGICAL HISTORY:  Past Surgical History:   Procedure Laterality Date    FIBULA FRACTURE SURGERY      HERNIA REPAIR      TONSILLECTOMY         FAMILY HISTORY:  Family History   Problem Relation Age of Onset    Leukemia Mother         acute myeloid    Hypertension Mother     Multiple myeloma Mother     Diabetes Father         mellitus    Hypertension Father     Pneumonia Father         NSIP (nonspecific interstital pneumonia)       SOCIAL HISTORY:  Social History     Socioeconomic History    Marital status: Legally      Spouse name: Not on file    Number of children: 0    Years of education: Not on file    Highest education level: Not on file   Occupational History    Occupation:  for [employer]     Comment: full-time employment   Tobacco Use    Smoking status: Never Smoker    Smokeless tobacco: Never Used   Substance and Sexual Activity    Alcohol use: Not Currently     Comment: no alchol use (as per allscripts)    Drug use: No    Sexual activity: Not on file   Other Topics Concern    Not on file   Social History Narrative    Brushes teeth twice a day    Dental care, regularly    Exercise: walking         Social Determinants of Health     Financial Resource Strain: Not on file   Food Insecurity: Not on file   Transportation Needs: Not on file   Physical Activity: Not on file   Stress: Not on file   Social Connections: Not on file   Intimate Partner Violence: Not on file   Housing Stability: Not on file       Review of Systems   Constitutional: Negative for activity change, chills, fatigue and fever  HENT: Negative for congestion  Eyes: Negative for discharge  Respiratory: Negative for cough, chest tightness and shortness of breath  Cardiovascular: Negative for chest pain, palpitations and leg swelling  Gastrointestinal: Negative for abdominal pain  Endocrine: Negative for polydipsia, polyphagia and polyuria  Genitourinary: Negative for difficulty urinating  Musculoskeletal: Positive for arthralgias and back pain  Negative for myalgias  Skin: Negative for rash  Allergic/Immunologic: Negative for immunocompromised state  Neurological: Negative for dizziness, syncope, weakness, light-headedness and headaches  Hematological: Negative for adenopathy  Does not bruise/bleed easily  Psychiatric/Behavioral: Negative for dysphoric mood  The patient is not nervous/anxious  Objective:  Vitals:    04/29/22 0751   BP: 164/100   BP Location: Left arm   Patient Position: Sitting   Cuff Size: Standard   Pulse: 92   Resp: 16   Temp: 98 °F (36 7 °C)   TempSrc: Tympanic   SpO2: 97%   Weight: 117 kg (259 lb)   Height: 5' 8" (1 727 m)     Body mass index is 39 38 kg/m²  Physical Exam  Vitals and nursing note reviewed  Constitutional:       General: He is not in acute distress  Appearance: He is well-developed  HENT:      Head: Normocephalic and atraumatic  Eyes:      Extraocular Movements: Extraocular movements intact  Pupils: Pupils are equal, round, and reactive to light  Pulmonary:      Effort: Pulmonary effort is normal  No respiratory distress  Musculoskeletal:      Right lower leg: No edema  Left lower leg: No edema  Comments: Palpable tenderness at left medial epicondyle  Tenderness on flexion  No instability  Medial left knee tenderness along joint space  No instability  No acute swelling  Palpable tenderness right mid back region more in the muscle area  Tenderness on range of motion  Skin:     General: Skin is warm and dry  Findings: No rash  Neurological:      General: No focal deficit present  Mental Status: He is alert and oriented to person, place, and time  Mental status is at baseline  Psychiatric:         Mood and Affect: Mood normal          Behavior: Behavior normal            RESULTS:    No results found for this or any previous visit (from the past 1008 hour(s))  This note was created with voice recognition software  Phonic, grammatical and spelling errors may be present within the note as a result

## 2022-06-03 DIAGNOSIS — E55.9 VITAMIN D DEFICIENCY: ICD-10-CM

## 2022-06-03 DIAGNOSIS — E55.9 VITAMIN D DEFICIENCY: Primary | ICD-10-CM

## 2022-06-03 RX ORDER — ERGOCALCIFEROL 1.25 MG/1
CAPSULE ORAL
Qty: 4 CAPSULE | Refills: 1 | Status: SHIPPED | OUTPATIENT
Start: 2022-06-03 | End: 2022-08-04

## 2022-06-27 DIAGNOSIS — M10.9 GOUT, UNSPECIFIED CAUSE, UNSPECIFIED CHRONICITY, UNSPECIFIED SITE: ICD-10-CM

## 2022-06-27 RX ORDER — ALLOPURINOL 100 MG/1
200 TABLET ORAL DAILY
Qty: 60 TABLET | Refills: 5 | Status: SHIPPED | OUTPATIENT
Start: 2022-06-27

## 2022-08-04 DIAGNOSIS — E55.9 VITAMIN D DEFICIENCY: ICD-10-CM

## 2022-08-04 RX ORDER — ERGOCALCIFEROL 1.25 MG/1
CAPSULE ORAL
Qty: 4 CAPSULE | Refills: 1 | Status: SHIPPED | OUTPATIENT
Start: 2022-08-04

## 2022-09-26 ENCOUNTER — APPOINTMENT (OUTPATIENT)
Dept: LAB | Facility: CLINIC | Age: 57
End: 2022-09-26
Payer: COMMERCIAL

## 2022-09-26 DIAGNOSIS — E78.2 MIXED HYPERLIPIDEMIA: ICD-10-CM

## 2022-09-26 DIAGNOSIS — I10 BENIGN HYPERTENSION: ICD-10-CM

## 2022-09-26 DIAGNOSIS — D75.839 THROMBOCYTOSIS: ICD-10-CM

## 2022-09-26 DIAGNOSIS — Z12.5 SCREENING FOR PROSTATE CANCER: ICD-10-CM

## 2022-09-26 DIAGNOSIS — R73.03 PREDIABETES: ICD-10-CM

## 2022-09-26 DIAGNOSIS — E55.9 VITAMIN D DEFICIENCY: ICD-10-CM

## 2022-09-26 DIAGNOSIS — M10.9 GOUT, UNSPECIFIED CAUSE, UNSPECIFIED CHRONICITY, UNSPECIFIED SITE: ICD-10-CM

## 2022-09-26 LAB
25(OH)D3 SERPL-MCNC: 57 NG/ML (ref 30–100)
ALBUMIN SERPL BCP-MCNC: 3.4 G/DL (ref 3.5–5)
ALP SERPL-CCNC: 90 U/L (ref 46–116)
ALT SERPL W P-5'-P-CCNC: 24 U/L (ref 12–78)
ANION GAP SERPL CALCULATED.3IONS-SCNC: 3 MMOL/L (ref 4–13)
AST SERPL W P-5'-P-CCNC: 19 U/L (ref 5–45)
BASOPHILS # BLD AUTO: 0.05 THOUSANDS/ΜL (ref 0–0.1)
BASOPHILS NFR BLD AUTO: 1 % (ref 0–1)
BILIRUB SERPL-MCNC: 0.88 MG/DL (ref 0.2–1)
BUN SERPL-MCNC: 25 MG/DL (ref 5–25)
CALCIUM ALBUM COR SERPL-MCNC: 10.2 MG/DL (ref 8.3–10.1)
CALCIUM SERPL-MCNC: 9.7 MG/DL (ref 8.3–10.1)
CHLORIDE SERPL-SCNC: 104 MMOL/L (ref 96–108)
CHOLEST SERPL-MCNC: 216 MG/DL
CO2 SERPL-SCNC: 31 MMOL/L (ref 21–32)
CREAT SERPL-MCNC: 1.39 MG/DL (ref 0.6–1.3)
EOSINOPHIL # BLD AUTO: 0.41 THOUSAND/ΜL (ref 0–0.61)
EOSINOPHIL NFR BLD AUTO: 11 % (ref 0–6)
ERYTHROCYTE [DISTWIDTH] IN BLOOD BY AUTOMATED COUNT: 12.8 % (ref 11.6–15.1)
GFR SERPL CREATININE-BSD FRML MDRD: 55 ML/MIN/1.73SQ M
GLUCOSE P FAST SERPL-MCNC: 119 MG/DL (ref 65–99)
HCT VFR BLD AUTO: 46 % (ref 36.5–49.3)
HDLC SERPL-MCNC: 96 MG/DL
HGB BLD-MCNC: 15 G/DL (ref 12–17)
IMM GRANULOCYTES # BLD AUTO: 0.02 THOUSAND/UL (ref 0–0.2)
IMM GRANULOCYTES NFR BLD AUTO: 1 % (ref 0–2)
LDLC SERPL CALC-MCNC: 106 MG/DL (ref 0–100)
LYMPHOCYTES # BLD AUTO: 0.88 THOUSANDS/ΜL (ref 0.6–4.47)
LYMPHOCYTES NFR BLD AUTO: 23 % (ref 14–44)
MCH RBC QN AUTO: 30.7 PG (ref 26.8–34.3)
MCHC RBC AUTO-ENTMCNC: 32.6 G/DL (ref 31.4–37.4)
MCV RBC AUTO: 94 FL (ref 82–98)
MONOCYTES # BLD AUTO: 0.47 THOUSAND/ΜL (ref 0.17–1.22)
MONOCYTES NFR BLD AUTO: 12 % (ref 4–12)
NEUTROPHILS # BLD AUTO: 2.02 THOUSANDS/ΜL (ref 1.85–7.62)
NEUTS SEG NFR BLD AUTO: 52 % (ref 43–75)
NONHDLC SERPL-MCNC: 120 MG/DL
NRBC BLD AUTO-RTO: 0 /100 WBCS
PLATELET # BLD AUTO: 413 THOUSANDS/UL (ref 149–390)
PMV BLD AUTO: 9.7 FL (ref 8.9–12.7)
POTASSIUM SERPL-SCNC: 4.5 MMOL/L (ref 3.5–5.3)
PROT SERPL-MCNC: 8 G/DL (ref 6.4–8.4)
PSA SERPL-MCNC: 1.5 NG/ML (ref 0–4)
RBC # BLD AUTO: 4.89 MILLION/UL (ref 3.88–5.62)
SODIUM SERPL-SCNC: 138 MMOL/L (ref 135–147)
TRIGL SERPL-MCNC: 68 MG/DL
URATE SERPL-MCNC: 7.3 MG/DL (ref 3.5–8.5)
WBC # BLD AUTO: 3.85 THOUSAND/UL (ref 4.31–10.16)

## 2022-09-26 PROCEDURE — 85025 COMPLETE CBC W/AUTO DIFF WBC: CPT

## 2022-09-26 PROCEDURE — 80061 LIPID PANEL: CPT

## 2022-09-26 PROCEDURE — G0103 PSA SCREENING: HCPCS

## 2022-09-26 PROCEDURE — 80053 COMPREHEN METABOLIC PANEL: CPT

## 2022-09-26 PROCEDURE — 36415 COLL VENOUS BLD VENIPUNCTURE: CPT

## 2022-09-26 PROCEDURE — 82306 VITAMIN D 25 HYDROXY: CPT

## 2022-09-26 PROCEDURE — 83036 HEMOGLOBIN GLYCOSYLATED A1C: CPT

## 2022-09-26 PROCEDURE — 84550 ASSAY OF BLOOD/URIC ACID: CPT

## 2022-09-27 LAB
EST. AVERAGE GLUCOSE BLD GHB EST-MCNC: 128 MG/DL
HBA1C MFR BLD: 6.1 %

## 2022-10-03 ENCOUNTER — OFFICE VISIT (OUTPATIENT)
Dept: INTERNAL MEDICINE CLINIC | Facility: CLINIC | Age: 57
End: 2022-10-03
Payer: COMMERCIAL

## 2022-10-03 VITALS
BODY MASS INDEX: 37.44 KG/M2 | HEART RATE: 75 BPM | DIASTOLIC BLOOD PRESSURE: 82 MMHG | OXYGEN SATURATION: 97 % | SYSTOLIC BLOOD PRESSURE: 120 MMHG | WEIGHT: 247 LBS | HEIGHT: 68 IN | TEMPERATURE: 98 F

## 2022-10-03 DIAGNOSIS — M10.9 GOUTY ARTHRITIS OF RIGHT GREAT TOE: ICD-10-CM

## 2022-10-03 DIAGNOSIS — I10 BENIGN HYPERTENSION: Primary | ICD-10-CM

## 2022-10-03 DIAGNOSIS — F33.9 DEPRESSION, RECURRENT (HCC): ICD-10-CM

## 2022-10-03 DIAGNOSIS — E78.2 MIXED HYPERLIPIDEMIA: ICD-10-CM

## 2022-10-03 DIAGNOSIS — R73.03 PREDIABETES: ICD-10-CM

## 2022-10-03 PROCEDURE — 99214 OFFICE O/P EST MOD 30 MIN: CPT | Performed by: PHYSICIAN ASSISTANT

## 2022-10-03 NOTE — PROGRESS NOTES
Assessment/Plan:   Patient Instructions   Continue lifestyle modifications as you can see it is definitely benefitting your health  Blood pressure under much better control  Lab numbers looking improved  Schedule follow-up for 6 months to be your annual physical with repeat labs for that visit  Follow-up sooner as needed  Continue current medications  Quality Measures:       Return in about 6 months (around 4/3/2023) for Annual physical          Diagnoses and all orders for this visit:    Benign hypertension  -     Comprehensive metabolic panel; Future  -     CBC and differential; Future    Depression, recurrent (HCC)    Mixed hyperlipidemia  -     Lipid panel; Future    Prediabetes  -     Hemoglobin A1C; Future    Gouty arthritis of right great toe  -     Uric acid; Future        Subjective:      Patient ID: Dean Irizarry is a 62 y o  male  Follow up, labs reviewed with patient    Hypertension:  Much better control  On amlodipine and losartan  Significant weight loss since last visit  Sidra cp, palp, sob, edema, HA, dizziness, diaphoresis, syncope, visual disturbance  Hyperlipidemia:  Currently not on any medication  Very high HDL  Marked improvement in profile with weight loss, and diet modification  Prediabetes:  A1c 6 1 with fasting blood sugar 119  Denies polyuria, polyphagia, polydipsia  Depression:  Many of his previous psychosocial stressors are gone  He is feeling much better, more positive  Considering weaning off of the Lexapro  We did discuss how he could do this  He will keep me informed  Gout:  Uric acid in normal range  Patient informs me that he is not taking his allopurinol since his last visit  He has not had any gout episodes  Again has modified his diet significantly along with lifestyle modifications  He would like to hold off taking the allopurinol unless he starts with recurrent gout episodes        ALLERGIES:  No Known Allergies    CURRENT MEDICATIONS:    Current Outpatient Medications:     albuterol (PROVENTIL HFA,VENTOLIN HFA) 90 mcg/act inhaler, Inhale 2 puffs every 6 (six) hours as needed for wheezing, Disp: 6 7 g, Rfl: 3    allopurinol (ZYLOPRIM) 100 mg tablet, Take 2 tablets (200 mg total) by mouth daily, Disp: 60 tablet, Rfl: 5    amLODIPine (NORVASC) 10 mg tablet, TAKE 1 TABLET BY MOUTH EVERY DAY, Disp: 30 tablet, Rfl: 5    ergocalciferol (VITAMIN D2) 50,000 units, TAKE 1 CAPSULE BY MOUTH ONE TIME PER WEEK, Disp: 4 capsule, Rfl: 1    escitalopram (LEXAPRO) 10 mg tablet, TAKE 1 TABLET BY MOUTH EVERY DAY, Disp: 30 tablet, Rfl: 5    losartan (COZAAR) 100 MG tablet, TAKE 1 TABLET BY MOUTH EVERY DAY, Disp: 30 tablet, Rfl: 5    tadalafil (CIALIS) 10 MG tablet, Take 1 tablet (10 mg total) by mouth daily as needed for erectile dysfunction, Disp: 10 tablet, Rfl: 2    ACTIVE PROBLEM LIST:  Patient Active Problem List   Diagnosis    Benign hypertension    LVH (left ventricular hypertrophy)    Major depressive disorder, single episode, moderate (HCC)    Obesity    Vitamin D deficiency    Chronic pain of right knee    Sleeping difficulty    Gouty arthritis of right great toe    Left foot pain    Gout    Prediabetes    Mixed hyperlipidemia    Depression, recurrent (HCC)       PAST MEDICAL HISTORY:  Past Medical History:   Diagnosis Date    Dyspnea on exertion     last assessed: 3/11/2016    Hypertension     Major depressive disorder, single episode, moderate (HCC) 12/19/2016       PAST SURGICAL HISTORY:  Past Surgical History:   Procedure Laterality Date    FIBULA FRACTURE SURGERY      HERNIA REPAIR      TONSILLECTOMY         FAMILY HISTORY:  Family History   Problem Relation Age of Onset    Leukemia Mother         acute myeloid    Hypertension Mother     Multiple myeloma Mother     Diabetes Father         mellitus    Hypertension Father     Pneumonia Father         NSIP (nonspecific interstital pneumonia)       SOCIAL HISTORY:  Social History     Socioeconomic History    Marital status:      Spouse name: Not on file    Number of children: 0    Years of education: Not on file    Highest education level: Not on file   Occupational History    Occupation:  for [employer]     Comment: full-time employment   Tobacco Use    Smoking status: Never Smoker    Smokeless tobacco: Never Used   Substance and Sexual Activity    Alcohol use: Not Currently     Comment: no alchol use (as per allscripts)    Drug use: No    Sexual activity: Not on file   Other Topics Concern    Not on file   Social History Narrative    Brushes teeth twice a day    Dental care, regularly    Exercise: walking         Social Determinants of Health     Financial Resource Strain: Not on file   Food Insecurity: Not on file   Transportation Needs: Not on file   Physical Activity: Not on file   Stress: Not on file   Social Connections: Not on file   Intimate Partner Violence: Not on file   Housing Stability: Not on file       Review of Systems   Constitutional: Negative for activity change, chills, fatigue and fever  HENT: Negative for congestion  Eyes: Positive for visual disturbance (  Wears glasses)  Negative for discharge  Respiratory: Negative for cough, chest tightness and shortness of breath  Cardiovascular: Negative for chest pain, palpitations and leg swelling  Gastrointestinal: Negative for abdominal pain, blood in stool, constipation, diarrhea, nausea and vomiting  Endocrine: Negative for polydipsia, polyphagia and polyuria  Genitourinary: Negative for difficulty urinating  Musculoskeletal: Negative for arthralgias and myalgias  Skin: Negative for rash  Allergic/Immunologic: Negative for immunocompromised state  Neurological: Negative for dizziness, syncope, weakness, light-headedness and headaches  Hematological: Negative for adenopathy  Does not bruise/bleed easily     Psychiatric/Behavioral: Negative for dysphoric mood, sleep disturbance and suicidal ideas  The patient is not nervous/anxious  Objective:  Vitals:    10/03/22 1017   BP: 120/82   BP Location: Left arm   Patient Position: Sitting   Cuff Size: Large   Pulse: 75   Temp: 98 °F (36 7 °C)   TempSrc: Tympanic   SpO2: 97%   Weight: 112 kg (247 lb)   Height: 5' 8" (1 727 m)     Body mass index is 37 56 kg/m²  Physical Exam  Vitals and nursing note reviewed  Constitutional:       General: He is not in acute distress  Appearance: He is well-developed  He is obese  He is not ill-appearing  HENT:      Head: Normocephalic and atraumatic  Eyes:      Extraocular Movements: Extraocular movements intact  Pupils: Pupils are equal, round, and reactive to light  Neck:      Thyroid: No thyromegaly  Vascular: No carotid bruit or JVD  Cardiovascular:      Rate and Rhythm: Normal rate and regular rhythm  Heart sounds: Normal heart sounds  Pulmonary:      Effort: Pulmonary effort is normal  No respiratory distress  Breath sounds: Normal breath sounds  Musculoskeletal:      Cervical back: Neck supple  Right lower leg: No edema  Left lower leg: No edema  Lymphadenopathy:      Cervical: No cervical adenopathy  Skin:     General: Skin is warm and dry  Findings: No rash  Neurological:      General: No focal deficit present  Mental Status: He is alert and oriented to person, place, and time  Mental status is at baseline     Psychiatric:         Mood and Affect: Mood normal          Behavior: Behavior normal            RESULTS:    Recent Results (from the past 1008 hour(s))   Comprehensive metabolic panel    Collection Time: 09/26/22  9:03 AM   Result Value Ref Range    Sodium 138 135 - 147 mmol/L    Potassium 4 5 3 5 - 5 3 mmol/L    Chloride 104 96 - 108 mmol/L    CO2 31 21 - 32 mmol/L    ANION GAP 3 (L) 4 - 13 mmol/L    BUN 25 5 - 25 mg/dL    Creatinine 1 39 (H) 0 60 - 1 30 mg/dL    Glucose, Fasting 119 (H) 65 - 99 mg/dL    Calcium 9 7 8 3 - 10 1 mg/dL    Corrected Calcium 10 2 (H) 8 3 - 10 1 mg/dL    AST 19 5 - 45 U/L    ALT 24 12 - 78 U/L    Alkaline Phosphatase 90 46 - 116 U/L    Total Protein 8 0 6 4 - 8 4 g/dL    Albumin 3 4 (L) 3 5 - 5 0 g/dL    Total Bilirubin 0 88 0 20 - 1 00 mg/dL    eGFR 55 ml/min/1 73sq m   CBC and differential    Collection Time: 09/26/22  9:03 AM   Result Value Ref Range    WBC 3 85 (L) 4 31 - 10 16 Thousand/uL    RBC 4 89 3 88 - 5 62 Million/uL    Hemoglobin 15 0 12 0 - 17 0 g/dL    Hematocrit 46 0 36 5 - 49 3 %    MCV 94 82 - 98 fL    MCH 30 7 26 8 - 34 3 pg    MCHC 32 6 31 4 - 37 4 g/dL    RDW 12 8 11 6 - 15 1 %    MPV 9 7 8 9 - 12 7 fL    Platelets 678 (H) 406 - 390 Thousands/uL    nRBC 0 /100 WBCs    Neutrophils Relative 52 43 - 75 %    Immat GRANS % 1 0 - 2 %    Lymphocytes Relative 23 14 - 44 %    Monocytes Relative 12 4 - 12 %    Eosinophils Relative 11 (H) 0 - 6 %    Basophils Relative 1 0 - 1 %    Neutrophils Absolute 2 02 1 85 - 7 62 Thousands/µL    Immature Grans Absolute 0 02 0 00 - 0 20 Thousand/uL    Lymphocytes Absolute 0 88 0 60 - 4 47 Thousands/µL    Monocytes Absolute 0 47 0 17 - 1 22 Thousand/µL    Eosinophils Absolute 0 41 0 00 - 0 61 Thousand/µL    Basophils Absolute 0 05 0 00 - 0 10 Thousands/µL   Lipid panel    Collection Time: 09/26/22  9:03 AM   Result Value Ref Range    Cholesterol 216 (H) See Comment mg/dL    Triglycerides 68 See Comment mg/dL    HDL, Direct 96 >=40 mg/dL    LDL Calculated 106 (H) 0 - 100 mg/dL    Non-HDL-Chol (CHOL-HDL) 120 mg/dl   Hemoglobin A1C    Collection Time: 09/26/22  9:03 AM   Result Value Ref Range    Hemoglobin A1C 6 1 (H) Normal 3 8-5 6%; PreDiabetic 5 7-6 4%;  Diabetic >=6 5%; Glycemic control for adults with diabetes <7 0% %     mg/dl   Uric acid    Collection Time: 09/26/22  9:03 AM   Result Value Ref Range    Uric Acid 7 3 3 5 - 8 5 mg/dL   PSA, Total Screen    Collection Time: 09/26/22  9:03 AM   Result Value Ref Range PSA 1 5 0 0 - 4 0 ng/mL   Vitamin D 25 hydroxy    Collection Time: 09/26/22  9:03 AM   Result Value Ref Range    Vit D, 25-Hydroxy 57 0 30 0 - 100 0 ng/mL       This note was created with voice recognition software  Phonic, grammatical and spelling errors may be present within the note as a result

## 2022-10-03 NOTE — PATIENT INSTRUCTIONS
Continue lifestyle modifications as you can see it is definitely benefitting your health  Blood pressure under much better control  Lab numbers looking improved  Schedule follow-up for 6 months to be your annual physical with repeat labs for that visit  Follow-up sooner as needed  Continue current medications

## 2022-11-04 ENCOUNTER — OCCMED (OUTPATIENT)
Dept: URGENT CARE | Facility: CLINIC | Age: 57
End: 2022-11-04

## 2022-11-04 ENCOUNTER — APPOINTMENT (OUTPATIENT)
Dept: RADIOLOGY | Facility: CLINIC | Age: 57
End: 2022-11-04

## 2022-11-04 DIAGNOSIS — M72.2 PLANTAR FASCIITIS: ICD-10-CM

## 2022-11-04 DIAGNOSIS — M72.2 PLANTAR FASCIITIS: Primary | ICD-10-CM

## 2022-11-04 DIAGNOSIS — S90.821A BLISTER OF RIGHT HEEL, INITIAL ENCOUNTER: ICD-10-CM

## 2022-11-11 ENCOUNTER — OCCMED (OUTPATIENT)
Dept: URGENT CARE | Facility: CLINIC | Age: 57
End: 2022-11-11

## 2022-11-11 DIAGNOSIS — M72.2 PLANTAR FASCIITIS: Primary | ICD-10-CM

## 2022-11-11 DIAGNOSIS — L08.9: ICD-10-CM

## 2022-11-11 DIAGNOSIS — S90.821D: ICD-10-CM

## 2022-11-17 ENCOUNTER — OFFICE VISIT (OUTPATIENT)
Dept: INTERNAL MEDICINE CLINIC | Facility: CLINIC | Age: 57
End: 2022-11-17

## 2022-11-17 VITALS
BODY MASS INDEX: 38.62 KG/M2 | SYSTOLIC BLOOD PRESSURE: 142 MMHG | RESPIRATION RATE: 16 BRPM | HEART RATE: 113 BPM | HEIGHT: 68 IN | DIASTOLIC BLOOD PRESSURE: 80 MMHG | WEIGHT: 254.8 LBS | OXYGEN SATURATION: 97 %

## 2022-11-17 DIAGNOSIS — J98.01 BRONCHOSPASM: ICD-10-CM

## 2022-11-17 DIAGNOSIS — M72.2 PLANTAR FASCIITIS: Primary | ICD-10-CM

## 2022-11-17 RX ORDER — METHYLPREDNISOLONE 4 MG/1
TABLET ORAL
Qty: 21 TABLET | Refills: 0 | Status: SHIPPED | OUTPATIENT
Start: 2022-11-17

## 2022-11-17 RX ORDER — ALBUTEROL SULFATE 90 UG/1
2 AEROSOL, METERED RESPIRATORY (INHALATION) EVERY 6 HOURS PRN
Qty: 6.7 G | Refills: 3 | Status: SHIPPED | OUTPATIENT
Start: 2022-11-17

## 2022-11-17 NOTE — PATIENT INSTRUCTIONS
Continue use of ice massage on right foot  Do this especially after returning from work  Only start Medrol pack if the plantar fasciitis starts acting up again  Follow-up if not steadily improving and as scheduled

## 2022-11-17 NOTE — LETTER
November 17, 2022     Patient: Fam Chowdhury  YOB: 1965  Date of Visit: 11/17/2022      To Whom it May Concern:    Franca Cerna is under my professional care  Christopher Garcia was seen in my office on 11/17/2022  Christopher Garcia may return to work on 11/21/22  Please excuse Alejandra Pagan from work 6 / 15/22 through 11/20/2022 due to an acute condition  If you have any questions or concerns, please don't hesitate to call           Sincerely,          Shwetha Cortes PA-C        CC: No Recipients

## 2022-11-17 NOTE — PROGRESS NOTES
Assessment/Plan:   Patient Instructions   Continue use of ice massage on right foot  Do this especially after returning from work  Only start Medrol pack if the plantar fasciitis starts acting up again  Follow-up if not steadily improving and as scheduled  Quality Measures:       No follow-ups on file  Diagnoses and all orders for this visit:    Plantar fasciitis  Comments:  R foot  Orders:  -     methylPREDNISolone 4 MG tablet therapy pack; Use as directed on package    Bronchospasm  -     albuterol (PROVENTIL HFA,VENTOLIN HFA) 90 mcg/act inhaler; Inhale 2 puffs every 6 (six) hours as needed for wheezing        Subjective:      Patient ID: Dean Irizarry is a 62 y o  male  Acute visit    Patient reports that he started with right foot pain early in the month  It occurred while he was at work therefore he was sent to Occupational Medicine  He was diagnosed with plantar fascitis  He was also noted to have a blister on his foot  He was eventually treated with conservative measures of ice massage therapy with good results  He did note that the symptoms waxed and waned for several days without fully improving  He did not take any anti-inflammatories  At this time he stating overall the foot is feeling much better        ALLERGIES:  No Known Allergies    CURRENT MEDICATIONS:    Current Outpatient Medications:   •  albuterol (PROVENTIL HFA,VENTOLIN HFA) 90 mcg/act inhaler, Inhale 2 puffs every 6 (six) hours as needed for wheezing, Disp: 6 7 g, Rfl: 3  •  amLODIPine (NORVASC) 10 mg tablet, TAKE 1 TABLET BY MOUTH EVERY DAY, Disp: 30 tablet, Rfl: 5  •  ergocalciferol (VITAMIN D2) 50,000 units, TAKE 1 CAPSULE BY MOUTH ONE TIME PER WEEK, Disp: 4 capsule, Rfl: 1  •  losartan (COZAAR) 100 MG tablet, TAKE 1 TABLET BY MOUTH EVERY DAY, Disp: 30 tablet, Rfl: 5  •  methylPREDNISolone 4 MG tablet therapy pack, Use as directed on package, Disp: 21 tablet, Rfl: 0  •  tadalafil (CIALIS) 10 MG tablet, Take 1 tablet (10 mg total) by mouth daily as needed for erectile dysfunction, Disp: 10 tablet, Rfl: 2  •  allopurinol (ZYLOPRIM) 100 mg tablet, Take 2 tablets (200 mg total) by mouth daily (Patient not taking: Reported on 11/17/2022), Disp: 60 tablet, Rfl: 5  •  escitalopram (LEXAPRO) 10 mg tablet, TAKE 1 TABLET BY MOUTH EVERY DAY (Patient not taking: Reported on 11/17/2022), Disp: 30 tablet, Rfl: 5    ACTIVE PROBLEM LIST:  Patient Active Problem List   Diagnosis   • Benign hypertension   • LVH (left ventricular hypertrophy)   • Major depressive disorder, single episode, moderate (HCC)   • Obesity   • Vitamin D deficiency   • Chronic pain of right knee   • Sleeping difficulty   • Gouty arthritis of right great toe   • Left foot pain   • Gout   • Prediabetes   • Mixed hyperlipidemia   • Depression, recurrent (HCC)       PAST MEDICAL HISTORY:  Past Medical History:   Diagnosis Date   • Dyspnea on exertion     last assessed: 3/11/2016   • Hypertension    • [facility] 12/19/2016       PAST SURGICAL HISTORY:  Past Surgical History:   Procedure Laterality Date   • FIBULA FRACTURE SURGERY     • HERNIA REPAIR     • TONSILLECTOMY         FAMILY HISTORY:  Family History   Problem Relation Age of Onset   • Leukemia Mother         acute myeloid   • Hypertension Mother    • Multiple myeloma Mother    • Diabetes Father         mellitus   • Hypertension Father    • Pneumonia Father         NSIP (nonspecific interstital pneumonia)       SOCIAL HISTORY:  Social History     Socioeconomic History   • Marital status:      Spouse name: Not on file   • Number of children: 0   • Years of education: Not on file   • Highest education level: Not on file   Occupational History   • Occupation:  for [employer]     Comment: full-time employment   Tobacco Use   • Smoking status: Never   • Smokeless tobacco: Never   Vaping Use   • Vaping Use: Never used   Substance and Sexual Activity   • Alcohol use: Not Currently     Comment: no alchol use (as per allscripts)   • Drug use: No   • Sexual activity: Not Currently   Other Topics Concern   • Not on file   Social History Narrative    Brushes teeth twice a day    Dental care, regularly    Exercise: walking         Social Determinants of Health     Financial Resource Strain: Not on file   Food Insecurity: Not on file   Transportation Needs: Not on file   Physical Activity: Not on file   Stress: Not on file   Social Connections: Not on file   Intimate Partner Violence: Not on file   Housing Stability: Not on file       Review of Systems   Constitutional: Negative for activity change, chills, fatigue and fever  HENT: Negative for congestion  Eyes: Negative for discharge  Respiratory: Negative for cough, chest tightness and shortness of breath  Cardiovascular: Negative for chest pain, palpitations and leg swelling  Gastrointestinal: Negative for abdominal pain  Genitourinary: Negative for difficulty urinating  Musculoskeletal: Positive for arthralgias  Negative for myalgias  Skin: Negative for rash  Allergic/Immunologic: Negative for immunocompromised state  Neurological: Negative for dizziness, syncope, weakness, light-headedness and headaches  Hematological: Negative for adenopathy  Does not bruise/bleed easily  Psychiatric/Behavioral: Negative for dysphoric mood  The patient is not nervous/anxious  Objective:  Vitals:    11/17/22 1444   BP: 142/80   BP Location: Left arm   Patient Position: Sitting   Cuff Size: Adult   Pulse: (!) 113   Resp: 16   SpO2: 97%   Weight: 116 kg (254 lb 12 8 oz)   Height: 5' 8" (1 727 m)     Body mass index is 38 74 kg/m²  Physical Exam  Vitals and nursing note reviewed  Constitutional:       General: He is not in acute distress  Appearance: He is well-developed  He is obese  He is not ill-appearing  HENT:      Head: Normocephalic and atraumatic     Eyes:      Extraocular Movements: Extraocular movements intact  Pupils: Pupils are equal, round, and reactive to light  Pulmonary:      Effort: Pulmonary effort is normal  No respiratory distress  Musculoskeletal:      Right lower leg: No edema  Left lower leg: No edema  Comments: Tenderness present plantar aspect of right foot  Decreased transverse foot arch  No neurovascular compromise  No blebs present today  Skin:     General: Skin is warm and dry  Findings: No rash  Neurological:      General: No focal deficit present  Mental Status: He is alert and oriented to person, place, and time  Mental status is at baseline  Psychiatric:         Mood and Affect: Mood normal          Behavior: Behavior normal            RESULTS:    No results found for this or any previous visit (from the past 1008 hour(s))  This note was created with voice recognition software  Phonic, grammatical and spelling errors may be present within the note as a result

## 2022-12-06 ENCOUNTER — APPOINTMENT (OUTPATIENT)
Dept: RADIOLOGY | Facility: CLINIC | Age: 57
End: 2022-12-06

## 2022-12-06 ENCOUNTER — OFFICE VISIT (OUTPATIENT)
Dept: URGENT CARE | Facility: CLINIC | Age: 57
End: 2022-12-06

## 2022-12-06 VITALS — HEART RATE: 115 BPM | RESPIRATION RATE: 18 BRPM | TEMPERATURE: 97.4 F | OXYGEN SATURATION: 95 %

## 2022-12-06 DIAGNOSIS — M25.561 ACUTE PAIN OF RIGHT KNEE: Primary | ICD-10-CM

## 2022-12-06 DIAGNOSIS — M25.561 ACUTE PAIN OF RIGHT KNEE: ICD-10-CM

## 2022-12-06 NOTE — PATIENT INSTRUCTIONS
Follow-up with your primary care provider in the next 2-3 days  Any new or worsening symptoms develop get re-evaluated sooner or proceed to the ER

## 2022-12-06 NOTE — PROGRESS NOTES
[facility]        NAME: Caryn Boyer is a 62 y o  male  : 1965    MRN: [id number]  DATE: 2022  TIME: 11:16 AM    Assessment and Plan   Acute pain of right knee [M25 561]  1  Acute pain of right knee  XR knee 3 vw right non injury            Patient Instructions   Patient Instructions   Follow-up with your primary care provider in the next 2-3 days  Any new or worsening symptoms develop get re-evaluated sooner or proceed to the ER  Follow up with PCP in 3-5 days  Proceed to  ER if symptoms worsen  Chief Complaint     Chief Complaint   Patient presents with   • R Knee Pain     States Knee pain started appox 5 days ago  States he has a hx of planter fascitis and thinks that's why his knee is hurting  States taking liquid motrin with little to no relief  No other interventions attempted          History of Present Illness       Patient presents with right knee pain  Went back to work this week and states he stands around a lot so by the end of the week he had right knee pain  Has some pain with full extension of the right knee but mainly pain is when standing/bearing weight on the leg  Pain over the posterior/lateral side of the knee  States 3 weeks ago had plantar fasciitis and was limping around  Denies any injury or event to start the symptoms  Denies numbness/tingling, bruising, swelling,   Also mild discomfort in the left knee  Tried ice with some relief and motrin  Review of Systems   Review of Systems   Musculoskeletal: Positive for arthralgias and gait problem  Negative for joint swelling  Skin: Negative for color change  Neurological: Negative for weakness and numbness           Current Medications       Current Outpatient Medications:   •  albuterol (PROVENTIL HFA,VENTOLIN HFA) 90 mcg/act inhaler, Inhale 2 puffs every 6 (six) hours as needed for wheezing, Disp: 6 7 g, Rfl: 3  •  amLODIPine (NORVASC) 10 mg tablet, TAKE 1 TABLET BY MOUTH EVERY DAY, Disp: 30 tablet, Rfl: 5  •  ergocalciferol (VITAMIN D2) 50,000 units, TAKE 1 CAPSULE BY MOUTH ONE TIME PER WEEK, Disp: 4 capsule, Rfl: 1  •  losartan (COZAAR) 100 MG tablet, TAKE 1 TABLET BY MOUTH EVERY DAY, Disp: 30 tablet, Rfl: 5  •  methylPREDNISolone 4 MG tablet therapy pack, Use as directed on package, Disp: 21 tablet, Rfl: 0  •  tadalafil (CIALIS) 10 MG tablet, Take 1 tablet (10 mg total) by mouth daily as needed for erectile dysfunction, Disp: 10 tablet, Rfl: 2  •  allopurinol (ZYLOPRIM) 100 mg tablet, Take 2 tablets (200 mg total) by mouth daily (Patient not taking: Reported on 11/17/2022), Disp: 60 tablet, Rfl: 5  •  escitalopram (LEXAPRO) 10 mg tablet, TAKE 1 TABLET BY MOUTH EVERY DAY (Patient not taking: Reported on 11/17/2022), Disp: 30 tablet, Rfl: 5    Current Allergies     Allergies as of 12/06/2022   • (No Known Allergies)            The following portions of the patient's history were reviewed and updated as appropriate: allergies, current medications, past family history, past medical history, past social history, past surgical history and problem list      Past Medical History:   Diagnosis Date   • Dyspnea on exertion     last assessed: 3/11/2016   • Hypertension    • Major depressive disorder, single episode, moderate ([facility] ) 12/19/2016       Past Surgical History:   Procedure Laterality Date   • FIBULA FRACTURE SURGERY     • HERNIA REPAIR     • TONSILLECTOMY         Family History   Problem Relation Age of Onset   • Leukemia Mother         acute myeloid   • Hypertension Mother    • Multiple myeloma Mother    • Diabetes Father         mellitus   • Hypertension Father    • Pneumonia Father         NSIP (nonspecific interstital pneumonia)         Medications have been verified  Objective   Pulse (!) 115   Temp (!) 97 4 °F (36 3 °C)   Resp 18   SpO2 95%        Physical Exam     Physical Exam  Constitutional:       Appearance: Normal appearance     Musculoskeletal:         General: Tenderness present  No swelling or deformity  Normal range of motion  Comments: Mild tenderness of the posterior lateral portion of the knee  No ecchymosis, swelling, deformity noted  No crepitus  Full active range of motion 5/5 muscle strength of lower extremities bilaterally  No ligament laxity with varus or valgus stress  Anterior and posterior drawer negative  Neurological:      Mental Status: He is alert     Psychiatric:         Mood and Affect: Mood normal          Behavior: Behavior normal

## 2022-12-06 NOTE — LETTER
December 6, 2022     Patient: Zachariah Panda  YOB: 1965  Date of Visit: 12/6/2022      To Whom it May Concern:    Savage Ryees is under my professional care  Sara Santana was seen in my office on 12/6/2022  Sara Santana may return to work on 12/08/2022  If you have any questions or concerns, please don't hesitate to call           Sincerely,          JAIDEN Valencia        CC: No Recipients

## 2022-12-12 ENCOUNTER — OFFICE VISIT (OUTPATIENT)
Dept: INTERNAL MEDICINE CLINIC | Facility: CLINIC | Age: 57
End: 2022-12-12

## 2022-12-12 VITALS
HEIGHT: 68 IN | BODY MASS INDEX: 39.56 KG/M2 | HEART RATE: 92 BPM | WEIGHT: 261 LBS | RESPIRATION RATE: 14 BRPM | DIASTOLIC BLOOD PRESSURE: 100 MMHG | SYSTOLIC BLOOD PRESSURE: 142 MMHG | OXYGEN SATURATION: 98 %

## 2022-12-12 DIAGNOSIS — M25.561 ACUTE PAIN OF RIGHT KNEE: Primary | ICD-10-CM

## 2022-12-12 DIAGNOSIS — I10 BENIGN HYPERTENSION: ICD-10-CM

## 2022-12-12 NOTE — PROGRESS NOTES
Assessment/Plan:   Patient Instructions   Start Medrol pack for right knee pain  Will also refer to Ortho for this ongoing knee pain  Return in 1 week for repeat blood pressure check  Quality Measures:       Return in about 1 week (around 12/19/2022) for Recheck  Diagnoses and all orders for this visit:    Acute pain of right knee  -     Ambulatory referral to Orthopedic Surgery; Future    Benign hypertension        Subjective:      Patient ID: Gorge Farmer is a 62 y o  male  At last visit in mid November patient was complaining of plantar fasciitis of right foot  Gradually improved however he believes because he was walking funny and limping he developed pain in his right lateral knee  The pain has increased to the point where he has not been able to work because weightbearing increases the pain as does walking, and pivoting  He went to urgent care on 12/6/2022, and x-ray showed Patellar enthesopathy  Anterior tibial tubercular enthesopathy  Patient continues to complain of pain and has not been back to work since he was seen at urgent care  He feels at times as if the knee is unstable and could give out on him causing him to fall  Blood pressure elevated today  States he did not take his medicine until noon today  Previous blood pressures were controlled  Sidra cp, palp, sob, edema, HA, dizziness, diaphoresis, syncope, visual disturbance  We will recheck blood pressure on follow-up visit in 1 week        ALLERGIES:  No Known Allergies    CURRENT MEDICATIONS:    Current Outpatient Medications:   •  albuterol (PROVENTIL HFA,VENTOLIN HFA) 90 mcg/act inhaler, Inhale 2 puffs every 6 (six) hours as needed for wheezing, Disp: 6 7 g, Rfl: 3  •  amLODIPine (NORVASC) 10 mg tablet, TAKE 1 TABLET BY MOUTH EVERY DAY, Disp: 30 tablet, Rfl: 5  •  ergocalciferol (VITAMIN D2) 50,000 units, TAKE 1 CAPSULE BY MOUTH ONE TIME PER WEEK, Disp: 4 capsule, Rfl: 1  •  losartan (COZAAR) 100 MG tablet, TAKE 1 TABLET BY MOUTH EVERY DAY, Disp: 30 tablet, Rfl: 5  •  methylPREDNISolone 4 MG tablet therapy pack, Use as directed on package (Patient taking differently: if needed Use as directed on package), Disp: 21 tablet, Rfl: 0  •  tadalafil (CIALIS) 10 MG tablet, Take 1 tablet (10 mg total) by mouth daily as needed for erectile dysfunction, Disp: 10 tablet, Rfl: 2  •  allopurinol (ZYLOPRIM) 100 mg tablet, Take 2 tablets (200 mg total) by mouth daily (Patient not taking: Reported on 12/12/2022), Disp: 60 tablet, Rfl: 5    ACTIVE PROBLEM LIST:  Patient Active Problem List   Diagnosis   • Benign hypertension   • LVH (left ventricular hypertrophy)   • Major depressive disorder, single episode, moderate (HCC)   • Obesity   • Vitamin D deficiency   • Chronic pain of right knee   • Sleeping difficulty   • Gouty arthritis of right great toe   • Left foot pain   • Gout   • Prediabetes   • Mixed hyperlipidemia   • Depression, recurrent (HCC)       PAST MEDICAL HISTORY:  Past Medical History:   Diagnosis Date   • Dyspnea on exertion     last assessed: 3/11/2016   • Hypertension    • [facility] 12/19/2016       PAST SURGICAL HISTORY:  Past Surgical History:   Procedure Laterality Date   • FIBULA FRACTURE SURGERY     • HERNIA REPAIR     • TONSILLECTOMY         FAMILY HISTORY:  Family History   Problem Relation Age of Onset   • Leukemia Mother         acute myeloid   • Hypertension Mother    • Multiple myeloma Mother    • Diabetes Father         mellitus   • Hypertension Father    • Pneumonia Father         NSIP (nonspecific interstital pneumonia)       SOCIAL HISTORY:  Social History     Socioeconomic History   • Marital status:      Spouse name: Not on file   • Number of children: 0   • Years of education: Not on file   • Highest education level: Not on file   Occupational History   • Occupation:  for [employer]     Comment: full-time employment   Tobacco Use   • Smoking status: Never   • Smokeless tobacco: Never   Vaping Use   • Vaping Use: Never used   Substance and Sexual Activity   • Alcohol use: Not Currently     Comment: no alchol use (as per allscripts)   • Drug use: Yes     Types: Marijuana   • Sexual activity: Not Currently   Other Topics Concern   • Not on file   Social History Narrative    Brushes teeth twice a day    Dental care, regularly    Exercise: walking         Social Determinants of Health     Financial Resource Strain: Not on file   Food Insecurity: Not on file   Transportation Needs: Not on file   Physical Activity: Not on file   Stress: Not on file   Social Connections: Not on file   Intimate Partner Violence: Not on file   Housing Stability: Not on file       Review of Systems   Constitutional: Negative for activity change, chills, fatigue and fever  HENT: Negative for congestion  Eyes: Negative for discharge  Respiratory: Negative for cough, chest tightness and shortness of breath  Cardiovascular: Negative for chest pain, palpitations and leg swelling  Gastrointestinal: Negative for abdominal pain  Genitourinary: Negative for difficulty urinating  Musculoskeletal: Positive for arthralgias  Negative for myalgias  Skin: Negative for rash  Allergic/Immunologic: Negative for immunocompromised state  Neurological: Negative for dizziness, syncope, weakness, light-headedness and headaches  Hematological: Negative for adenopathy  Does not bruise/bleed easily  Psychiatric/Behavioral: Negative for dysphoric mood  The patient is not nervous/anxious  Objective:  Vitals:    12/12/22 1510 12/12/22 1627   BP: (!) 162/104 142/100   BP Location: Left arm Left arm   Patient Position: Sitting Sitting   Pulse: 92    Resp: 14    SpO2: 98%    Weight: 118 kg (261 lb)    Height: 5' 8" (1 727 m)      Body mass index is 39 68 kg/m²  Physical Exam  Vitals and nursing note reviewed     Constitutional:       General: He is not in acute distress  Appearance: He is well-developed  He is obese  HENT:      Head: Normocephalic and atraumatic  Eyes:      Extraocular Movements: Extraocular movements intact  Pupils: Pupils are equal, round, and reactive to light  Cardiovascular:      Rate and Rhythm: Normal rate  Musculoskeletal:      Right lower leg: No edema  Left lower leg: No edema  Comments: Tender right posterior lateral knee area  Tenderness in that area on flexion and extension  Complains of tenderness on full extension and full flexion  No instability demonstrated  Dana's negative  Skin:     General: Skin is warm and dry  Findings: No rash  Neurological:      General: No focal deficit present  Mental Status: He is alert and oriented to person, place, and time  Mental status is at baseline  Psychiatric:         Mood and Affect: Mood normal          Behavior: Behavior normal            RESULTS:    No results found for this or any previous visit (from the past 1008 hour(s))  This note was created with voice recognition software  Phonic, grammatical and spelling errors may be present within the note as a result

## 2022-12-12 NOTE — PATIENT INSTRUCTIONS
Start Medrol pack for right knee pain  Will also refer to Ortho for this ongoing knee pain  Return in 1 week for repeat blood pressure check

## 2022-12-13 ENCOUNTER — OFFICE VISIT (OUTPATIENT)
Dept: OBGYN CLINIC | Facility: CLINIC | Age: 57
End: 2022-12-13

## 2022-12-13 VITALS
SYSTOLIC BLOOD PRESSURE: 180 MMHG | HEART RATE: 88 BPM | DIASTOLIC BLOOD PRESSURE: 127 MMHG | BODY MASS INDEX: 39.34 KG/M2 | WEIGHT: 259.6 LBS | HEIGHT: 68 IN

## 2022-12-13 DIAGNOSIS — S83.281A TEAR OF LATERAL MENISCUS OF RIGHT KNEE, UNSPECIFIED TEAR TYPE, UNSPECIFIED WHETHER OLD OR CURRENT TEAR, INITIAL ENCOUNTER: Primary | ICD-10-CM

## 2022-12-13 DIAGNOSIS — M25.561 ACUTE PAIN OF RIGHT KNEE: ICD-10-CM

## 2022-12-13 NOTE — LETTER
December 13, 2022     Patient: Palma Bowers  YOB: 1965  Date of Visit: 12/13/2022      To Whom it May Concern:    Gurpreet Kunz is under my professional care  Kt Albarado was seen in my office on 12/13/2022  Please excuse patient from work from today,  12/13/22  To   12/18/22  May return to work full duty on 12/19/22  If you have any questions or concerns, please don't hesitate to call           Sincerely,          Yuri Reyna MD

## 2022-12-13 NOTE — PROGRESS NOTES
Orthopaedics Office Visit - 1st Patient Visit    ASSESSMENT/PLAN:    Assessment:   Right knee patellofemoral OA   Probable lateral meniscus tear , patient had acute injury, no pre-existing knee pain      Plan:   - Discussed conservative treatment with patient at length  - Weight bearing as tolerated right lower extremity   - Begin physical therapy and maintain knee brace as directed   - Over the counter analgesics as needed / directed   - Ice / heat as directed  - Discussed possibility of additional imaging in future pending symptoms    - Follow up 6 weeks       To Do Next Visit:  Evaluate knee pain     _____________________________________________________  CHIEF COMPLAINT:  Chief Complaint   Patient presents with   • Right Knee - Follow-up, Pain         SUBJECTIVE:  Caryn Boyer is a 62 y o  male who presents to the office for initial relation of his right knee  Patient states that symptoms have been ongoing the past 2 weeks in duration with no injury of note  Patient states that he began to experience knee pain and weakness 2 weeks ago and states that approximately 1 week ago he was seen and evaluated at a local urgent care where x-rays were taken and was diagnosed with a knee sprain  Patient states that he rested the knee and began to ice the knee which provided some relief of symptoms  Patient states that a few days ago he was walking downstairs and felt a pop in the lateral aspect of his knee with associated instability  Patient states that his main complaint is instability and weakness in the knee  Patient states that turning makes his pain worse in particular  Patient states that his pain is predominantly on the lateral aspect of the knee  Patient does admit to having clicking and popping in the knee associated with range of motion     Patient was seen and evaluated by his primary care doctor yesterday who prescribed the patient a steroid pack which she has been taking over the past 24 hours which has provided minimal relief of symptoms  States he has been taking Tylenol as needed for pain with mild relief of symptoms  Denies any prior history of right knee pain or injuries prior to 2 weeks ago  Patient offers no other complaints at this time           PAST MEDICAL HISTORY:  Past Medical History:   Diagnosis Date   • Dyspnea on exertion     last assessed: 3/11/2016   • Hypertension    • Major depressive disorder, single episode, moderate ([address] ) 12/19/2016       PAST SURGICAL HISTORY:  Past Surgical History:   Procedure Laterality Date   • FIBULA FRACTURE SURGERY     • HERNIA REPAIR     • TONSILLECTOMY         FAMILY HISTORY:  Family History   Problem Relation Age of Onset   • Leukemia Mother         acute myeloid   • Hypertension Mother    • Multiple myeloma Mother    • Diabetes Father         mellitus   • Hypertension Father    • Pneumonia Father         NSIP (nonspecific interstital pneumonia)       SOCIAL HISTORY:  Social History     Tobacco Use   • Smoking status: Never   • Smokeless tobacco: Never   Vaping Use   • Vaping Use: Never used   Substance Use Topics   • Alcohol use: Not Currently     Comment: no alchol use (as per allscripts)   • Drug use: Yes     Types: Marijuana       MEDICATIONS:    Current Outpatient Medications:   •  albuterol (PROVENTIL HFA,VENTOLIN HFA) 90 mcg/act inhaler, Inhale 2 puffs every 6 (six) hours as needed for wheezing, Disp: 6 7 g, Rfl: 3  •  amLODIPine (NORVASC) 10 mg tablet, TAKE 1 TABLET BY MOUTH EVERY DAY, Disp: 30 tablet, Rfl: 5  •  ergocalciferol (VITAMIN D2) 50,000 units, TAKE 1 CAPSULE BY MOUTH ONE TIME PER WEEK, Disp: 4 capsule, Rfl: 1  •  losartan (COZAAR) 100 MG tablet, TAKE 1 TABLET BY MOUTH EVERY DAY, Disp: 30 tablet, Rfl: 5  •  methylPREDNISolone 4 MG tablet therapy pack, Use as directed on package (Patient taking differently: if needed Use as directed on package), Disp: 21 tablet, Rfl: 0  •  tadalafil (CIALIS) 10 MG tablet, Take 1 tablet (10 mg total) by mouth daily as needed for erectile dysfunction, Disp: 10 tablet, Rfl: 2  •  allopurinol (ZYLOPRIM) 100 mg tablet, Take 2 tablets (200 mg total) by mouth daily (Patient not taking: Reported on 12/12/2022), Disp: 60 tablet, Rfl: 5    ALLERGIES:  No Known Allergies    REVIEW OF SYSTEMS:  MSK: Right knee pain   Neuro: WNL   Pertinent items are otherwise noted in HPI  A comprehensive review of systems was otherwise negative  LABS:  HgA1c:   Lab Results   Component Value Date    HGBA1C 6 1 (H) 09/26/2022     BMP:   Lab Results   Component Value Date    CALCIUM 9 7 09/26/2022     12/29/2017    K 4 5 09/26/2022    CO2 31 09/26/2022     09/26/2022    BUN 25 09/26/2022    CREATININE 1 39 (H) 09/26/2022     CBC: No components found for: CBC    _____________________________________________________  PHYSICAL EXAMINATION:  Vital signs: BP (!) 180/127   Pulse 88   Ht 5' 8" (1 727 m)   Wt 118 kg (259 lb 9 6 oz)   BMI 39 47 kg/m²   General: No acute distress, awake and alert  Psychiatric: Mood and affect appear appropriate  HEENT: Trachea Midline, No torticollis, no apparent facial trauma  Cardiovascular: No audible murmurs; Extremities appear perfused  Pulmonary: No audible wheezing or stridor  Skin: No open lesions; see further details (if any) below      MUSCULOSKELETAL EXAMINATION:  Right knee examination:  - Patient sitting comfortably in the office in no acute distress   -No acute visible abnormalities present in the right knee  Extremity appears well-perfused overall   -Palpation noted over the medial aspect of the knee    No other bony or soft tissue tenderness to palpation noted at this time   -Full range of motion of the knee noted with pain associated with terminal flexion  - Special Tests       -   + Dana's examination laterally, ligamentously intact in all planes  - NV intact    _____________________________________________________  STUDIES REVIEWED:  I personally reviewed the images and interpretation is as follows:  Right knee XR 3 views:  No acute osseous abnormality  Mild patellofemoral OA   Patellar enthesopathy  Anterior tibial tubercular enthesopathy  PROCEDURES PERFORMED:  No procedures were performed at this time  Brigitte Tavares PA-C - assisting  Vivien Villasenor                Portions of the record may have been created with voice recognition software  Occasional wrong word or "sound a like" substitutions may have occurred due to the inherent limitations of voice recognition software  Read the chart carefully and recognize, using context, where substitutions have occurred

## 2022-12-14 PROBLEM — S83.281A TEAR OF LATERAL MENISCUS OF RIGHT KNEE: Status: ACTIVE | Noted: 2022-12-14

## 2022-12-27 ENCOUNTER — OFFICE VISIT (OUTPATIENT)
Dept: URGENT CARE | Facility: MEDICAL CENTER | Age: 57
End: 2022-12-27

## 2022-12-27 ENCOUNTER — APPOINTMENT (OUTPATIENT)
Dept: RADIOLOGY | Facility: MEDICAL CENTER | Age: 57
End: 2022-12-27

## 2022-12-27 VITALS
HEART RATE: 94 BPM | DIASTOLIC BLOOD PRESSURE: 84 MMHG | RESPIRATION RATE: 18 BRPM | BODY MASS INDEX: 39.71 KG/M2 | SYSTOLIC BLOOD PRESSURE: 193 MMHG | TEMPERATURE: 99.6 F | HEIGHT: 68 IN | OXYGEN SATURATION: 96 % | WEIGHT: 262 LBS

## 2022-12-27 DIAGNOSIS — S83.91XA SPRAIN OF RIGHT KNEE, UNSPECIFIED LIGAMENT, INITIAL ENCOUNTER: Primary | ICD-10-CM

## 2022-12-27 NOTE — LETTER
December 27, 2022     Patient: Chicho Ramirez   YOB: 1965   Date of Visit: 12/27/2022       To Whom it May Concern:    Pia Wheeler was seen in my clinic on 12/27/2022  He may return to work on with 12/30/2022  If you have any questions or concerns, please don't hesitate to call           Sincerely,          [facility]        CC: No Recipients

## 2022-12-27 NOTE — PROGRESS NOTES
[id number]        NAME: Mary Ann Winchester is a 62 y o  male  : 1965    MRN: [id number]  DATE: 2022  TIME: 10:51 AM    Assessment and Plan   Sprain of right knee, unspecified ligament, initial encounter [[id number] [id number]]  1  Sprain of right knee, unspecified ligament, initial encounter              Patient Instructions     Right knee sprain  Rest, ice, elevate  Follow up with PCP in 3-5 days  Proceed to  ER if symptoms worsen  Chief Complaint     Chief Complaint   Patient presents with   • Knee Pain     Pt states he hurt his right knee 2-3 weeks ago, pt states the pain has been worse x2 days  History of Present Illness       60-year-old male who presents complaining of right knee pain  States that he has been seeing orthopedics for the same  Was diagnosed with a meniscal tear and he is scheduled for physical therapy  States he needs a note for work today  Denies any recent trauma  Review of Systems   Review of Systems   Constitutional: Negative  HENT: Negative  Eyes: Negative  Respiratory: Negative  Negative for apnea, cough, choking, chest tightness, shortness of breath, wheezing and stridor  Cardiovascular: Negative  Negative for chest pain  Musculoskeletal: Positive for arthralgias           Current Medications       Current Outpatient Medications:   •  albuterol (PROVENTIL HFA,VENTOLIN HFA) 90 mcg/act inhaler, Inhale 2 puffs every 6 (six) hours as needed for wheezing, Disp: 6 7 g, Rfl: 3  •  amLODIPine (NORVASC) 10 mg tablet, TAKE 1 TABLET BY MOUTH EVERY DAY, Disp: 30 tablet, Rfl: 5  •  ergocalciferol (VITAMIN D2) 50,000 units, TAKE 1 CAPSULE BY MOUTH ONE TIME PER WEEK, Disp: 4 capsule, Rfl: 1  •  losartan (COZAAR) 100 MG tablet, TAKE 1 TABLET BY MOUTH EVERY DAY, Disp: 30 tablet, Rfl: 5  •  methylPREDNISolone 4 MG tablet therapy pack, Use as directed on package (Patient taking differently: if needed Use as directed on package), Disp: 21 tablet, Rfl: 0  • tadalafil (CIALIS) 10 MG tablet, Take 1 tablet (10 mg total) by mouth daily as needed for erectile dysfunction, Disp: 10 tablet, Rfl: 2  •  allopurinol (ZYLOPRIM) 100 mg tablet, Take 2 tablets (200 mg total) by mouth daily (Patient not taking: Reported on 12/12/2022), Disp: 60 tablet, Rfl: 5    Current Allergies     Allergies as of 12/27/2022   • (No Known Allergies)            The following portions of the patient's history were reviewed and updated as appropriate: allergies, current medications, past family history, past medical history, past social history, past surgical history and problem list      Past Medical History:   Diagnosis Date   • Dyspnea on exertion     last assessed: 3/11/2016   • Hypertension    • Major depressive disorder, single episode, moderate ([facility] ) 12/19/2016       Past Surgical History:   Procedure Laterality Date   • FIBULA FRACTURE SURGERY     • HERNIA REPAIR     • TONSILLECTOMY         Family History   Problem Relation Age of Onset   • Leukemia Mother         acute myeloid   • Hypertension Mother    • Multiple myeloma Mother    • Diabetes Father         mellitus   • Hypertension Father    • Pneumonia Father         NSIP (nonspecific interstital pneumonia)         Medications have been verified  Objective   BP (!) 193/84 (BP Location: Left arm)   Pulse 94   Temp 99 6 °F (37 6 °C) (Temporal)   Resp 18   Ht 5' 8" (1 727 m)   Wt 119 kg (262 lb)   SpO2 96%   BMI 39 84 kg/m²        Physical Exam     Physical Exam  Constitutional:       General: He is not in acute distress  Appearance: He is well-developed  He is not diaphoretic  Cardiovascular:      Rate and Rhythm: Normal rate and regular rhythm  Heart sounds: Normal heart sounds  Pulmonary:      Effort: Pulmonary effort is normal  No respiratory distress  Breath sounds: Normal breath sounds  No wheezing or rales  Chest:      Chest wall: No tenderness     Musculoskeletal:      Cervical back: Normal range of motion and neck supple  Right knee: Swelling present  No deformity, effusion, erythema, ecchymosis or lacerations  Normal range of motion  Tenderness present over the medial joint line and lateral joint line  No LCL laxity, MCL laxity, ACL laxity or PCL laxity  Left knee: Normal    Lymphadenopathy:      Cervical: No cervical adenopathy  Patient counseled on high blood pressure  Advised to follow-up with primary care doctor  Denies chest pain, shortness of breath, dizziness, headaches, palpitations

## 2022-12-27 NOTE — PATIENT INSTRUCTIONS
Right knee sprain  Rest, ice, elevate  Follow up with PCP in 3-5 days  Proceed to  ER if symptoms worsen

## 2022-12-29 ENCOUNTER — OFFICE VISIT (OUTPATIENT)
Dept: OBGYN CLINIC | Facility: CLINIC | Age: 57
End: 2022-12-29

## 2022-12-29 VITALS
WEIGHT: 262 LBS | TEMPERATURE: 98.7 F | DIASTOLIC BLOOD PRESSURE: 116 MMHG | SYSTOLIC BLOOD PRESSURE: 168 MMHG | BODY MASS INDEX: 39.71 KG/M2 | HEART RATE: 84 BPM | HEIGHT: 68 IN

## 2022-12-29 DIAGNOSIS — S83.281A TEAR OF LATERAL MENISCUS OF RIGHT KNEE, UNSPECIFIED TEAR TYPE, UNSPECIFIED WHETHER OLD OR CURRENT TEAR, INITIAL ENCOUNTER: Primary | ICD-10-CM

## 2022-12-29 RX ORDER — BETAMETHASONE SODIUM PHOSPHATE AND BETAMETHASONE ACETATE 3; 3 MG/ML; MG/ML
12 INJECTION, SUSPENSION INTRA-ARTICULAR; INTRALESIONAL; INTRAMUSCULAR; SOFT TISSUE
Status: COMPLETED | OUTPATIENT
Start: 2022-12-29 | End: 2022-12-29

## 2022-12-29 RX ORDER — BUPIVACAINE HYDROCHLORIDE 2.5 MG/ML
2 INJECTION, SOLUTION INFILTRATION; PERINEURAL
Status: COMPLETED | OUTPATIENT
Start: 2022-12-29 | End: 2022-12-29

## 2022-12-29 RX ADMIN — BUPIVACAINE HYDROCHLORIDE 2 ML: 2.5 INJECTION, SOLUTION INFILTRATION; PERINEURAL at 09:31

## 2022-12-29 RX ADMIN — BETAMETHASONE SODIUM PHOSPHATE AND BETAMETHASONE ACETATE 12 MG: 3; 3 INJECTION, SUSPENSION INTRA-ARTICULAR; INTRALESIONAL; INTRAMUSCULAR; SOFT TISSUE at 09:31

## 2022-12-29 NOTE — PATIENT INSTRUCTIONS
- Discussed conservative treatment with patient at length  - Offered patient cortisone injection  Patient accepted and tolerated well  - Physician guided home exercise program provided for patient   - Begin physical therapy as previously directed   - Over the counter analgesics as needed / directed   - Ice / heat as directed   - Follow up 4 weeks     Patellofemoral Pain Syndrome Exercises   WHAT YOU NEED TO KNOW:   You will need to ice and elevate your knee the first few days after the pain starts  Your healthcare provider may send you to physical therapy  A physical therapist will teach you exercises to strengthen the muscles in your legs, including around your knee  Strong leg muscles can help prevent more injuries  He or she will give you exercises to do at home  DISCHARGE INSTRUCTIONS:   What you need to know about PFPS:   Only do exercises as instructed  Your physical therapist will tell you which exercises to do and how many times to do them  Stop if you feel pain  You may feel some discomfort when you start  This should get better as you continue the exercises  You should not feel pain  Stop and call your physical therapist or healthcare provider right away  Exercises to do at home: Your physical therapist or healthcare provider may give you any of the following exercises to do at home:  Wall lean:  Stand with your side against the wall  Bend and raise the leg closest to the wall  Press your knee into the wall  Hold the position as long as directed  Repeat on the other side  Quad set exercise:  Lie on a flat, firm surface  Bend your left leg until your foot is flat on the floor  Keep your right leg straight  Tighten the top of your right thigh and hold for 10 to 20 seconds, and then relax  Repeat this exercise 5 to 10 times  Then repeat on your other leg  Short arc quad (SAQ) exercise:  Lie on a flat, firm surface  Place a rolled up towel or foam roller under your injured knee  Bend your knee  Tighten your quad muscle and lift your foot as you straighten your leg  Hold for 5 seconds and then return to the starting position  Keep your knee touching the towel or roller at all times  Straight leg lift:  Lie on a flat, firm surface  Bend your left leg until your foot is flat on the floor  Raise your right leg several inches off the floor and hold for 5 to 10 seconds  Lower your leg slowly  Repeat this exercise 5 to 10 times  Then repeat on your other leg  Clam exercise:  Lie on your side so your injured side is on top  Bend your knees  Keep your heels together during this exercise  Slowly raise your top knee toward the ceiling  Then lower your leg so your knees are together  Single leg stance: Your goal is to put weight on your injured leg  First stand with your weight evenly on both feet  You may hold on to a chair or wall for balance  Do not lean to the side  Then lift your foot so all your weight is on your injured leg  Ask your healthcare provider how long to hold this position  Single leg squat:  Stand on one leg  Raise the other leg straight out with toes pointed up  Bend the standing leg into a squat and slowly come back to a standing position  Ball bridge:  Lie on your back with legs straight and ankles on the ball  Keep your legs straight  Slowly raise your hips off the floor by making your buttock muscles tight  Hold for 5 seconds  Repeat as directed  Ball bridge with knee flexion:  Lie on your back  Bend your knees and put your feet on the ball  Lift your hips off the floor by making your buttocks muscles tight  Make sure to keep your feet on the ball and knees bent  Standing hip abduction with band:  Stand with legs hip width apart with a band around your ankles  Lift your leg out to the side and stretch the band  Bring your leg back down  Repeat on the other side         © Copyright [employer] 2022 Information is for End User's use only and may not be sold, redistributed or otherwise used for commercial purposes  All illustrations and images included in CareNotes® are the copyrighted property of A D A M , Inc  or Jeannine Tay  The above information is an  only  It is not intended as medical advice for individual conditions or treatments  Talk to your doctor, nurse or pharmacist before following any medical regimen to see if it is safe and effective for you

## 2022-12-29 NOTE — LETTER
December 29, 2022     Patient: Adalid Fang  YOB: 1965  Date of Visit: 12/29/2022      To Whom it May Concern:    Maxine Fernández is under my professional care  Riccardo Ames was seen in my office on 12/29/2022  Please excuse patient from work until otherwise stated  Patient may return to work on Tuesday, 1/3/23, without restrictions  If you have any questions or concerns, please don't hesitate to call           Sincerely,          Brandan Wharton MD

## 2022-12-29 NOTE — PROGRESS NOTES
Orthopaedics Office Visit - Follow up Patient Visit    ASSESSMENT/PLAN:    Assessment:   Right knee patellofemoral OA   Probable lateral meniscus tear , patient had acute injury, no pre-existing knee pain      Plan:   - Discussed conservative treatment with patient at length  - Offered patient cortisone injection  Patient accepted and tolerated well  - Physician guided home exercise program provided for patient   - Begin physical therapy as previously directed   - Over the counter analgesics as needed / directed   - Ice / heat as directed   - Follow up 4 weeks         To Do Next Visit:  Evaluate knee pain   _____________________________________________________  CHIEF COMPLAINT:  Chief Complaint   Patient presents with   • Right Knee - Follow-up         SUBJECTIVE:  Clayton Phillips is a 62 y o  male who presents to the office for follow-up evaluation of his right knee  Patient states that he continues to have pain throughout the knee  Patient states that few days ago he began to have increased pain in the knee with no recent injury  Patient was seen and evaluated at a local urgent care and was advised to follow-up with orthopedics again  Patient states that his pain is predominantly on the medial and lateral aspect the knee becomes worse with bending, twisting, weightbearing  Patient states he has been maintaining his knee brace provided to him at the last appointment which provides stability to the knee  Patient states that he has not started physical therapy yet and is scheduled to start next week  Patient states he has been taking over-the-counter pain medication which provides mild relief of symptoms  Patient offers no other complaints at this time        PAST MEDICAL HISTORY:  Past Medical History:   Diagnosis Date   • Dyspnea on exertion     last assessed: 3/11/2016   • Hypertension    • Major depressive disorder, single episode, moderate ([facility] [address] ) 12/19/2016       PAST SURGICAL HISTORY:  Past Surgical History:   Procedure Laterality Date   • FIBULA FRACTURE SURGERY     • HERNIA REPAIR     • TONSILLECTOMY         FAMILY HISTORY:  Family History   Problem Relation Age of Onset   • Leukemia Mother         acute myeloid   • Hypertension Mother    • Multiple myeloma Mother    • Diabetes Father         mellitus   • Hypertension Father    • Pneumonia Father         NSIP (nonspecific interstital pneumonia)       SOCIAL HISTORY:  Social History     Tobacco Use   • Smoking status: Never   • Smokeless tobacco: Never   Vaping Use   • Vaping Use: Never used   Substance Use Topics   • Alcohol use: Not Currently     Comment: no alchol use (as per allscripts)   • Drug use: Yes     Types: Marijuana       MEDICATIONS:    Current Outpatient Medications:   •  albuterol (PROVENTIL HFA,VENTOLIN HFA) 90 mcg/act inhaler, Inhale 2 puffs every 6 (six) hours as needed for wheezing, Disp: 6 7 g, Rfl: 3  •  allopurinol (ZYLOPRIM) 100 mg tablet, Take 2 tablets (200 mg total) by mouth daily (Patient not taking: Reported on 12/12/2022), Disp: 60 tablet, Rfl: 5  •  amLODIPine (NORVASC) 10 mg tablet, TAKE 1 TABLET BY MOUTH EVERY DAY, Disp: 30 tablet, Rfl: 5  •  ergocalciferol (VITAMIN D2) 50,000 units, TAKE 1 CAPSULE BY MOUTH ONE TIME PER WEEK, Disp: 4 capsule, Rfl: 1  •  losartan (COZAAR) 100 MG tablet, TAKE 1 TABLET BY MOUTH EVERY DAY, Disp: 30 tablet, Rfl: 5  •  methylPREDNISolone 4 MG tablet therapy pack, Use as directed on package (Patient taking differently: if needed Use as directed on package), Disp: 21 tablet, Rfl: 0  •  tadalafil (CIALIS) 10 MG tablet, Take 1 tablet (10 mg total) by mouth daily as needed for erectile dysfunction, Disp: 10 tablet, Rfl: 2    ALLERGIES:  No Known Allergies    REVIEW OF SYSTEMS:  MSK: right knee pain   Neuro: WNL   Pertinent items are otherwise noted in HPI  A comprehensive review of systems was otherwise negative      LABS:  HgA1c:   Lab Results   Component Value Date    HGBA1C 6 1 (H) 09/26/2022     BMP: Lab Results   Component Value Date    CALCIUM 9 7 09/26/2022     12/29/2017    K 4 5 09/26/2022    CO2 31 09/26/2022     09/26/2022    BUN 25 09/26/2022    CREATININE 1 39 (H) 09/26/2022     CBC: No components found for: CBC    _____________________________________________________  PHYSICAL EXAMINATION:  Vital signs: There were no vitals taken for this visit  General: No acute distress, awake and alert  Psychiatric: Mood and affect appear appropriate  HEENT: Trachea Midline, No torticollis, no apparent facial trauma  Cardiovascular: No audible murmurs; Extremities appear perfused  Pulmonary: No audible wheezing or stridor  Skin: No open lesions; see further details (if any) below      MUSCULOSKELETAL EXAMINATION:  Right knee examination:  - Patient sitting comfortably in the office in no acute distress   -No acute visible abnormalities present in the right knee  Extremity appears well-perfused overall   -Palpation noted over the medial aspect of the knee  No other bony or soft tissue tenderness to palpation noted at this time   -Full range of motion of the knee noted with pain associated with terminal flexion  - Special Tests       -   + Dana's examination laterally, ligamentously intact in all planes  - NV intact    _____________________________________________________  STUDIES REVIEWED:  I personally reviewed the images and interpretation is as follows:  N/A       PROCEDURES PERFORMED:  Large joint arthrocentesis: R knee  Universal Protocol:  Consent: Verbal consent obtained    Risks and benefits: risks, benefits and alternatives were discussed  Consent given by: patient  Patient understanding: patient states understanding of the procedure being performed  Site marked: the operative site was marked  Patient identity confirmed: verbally with patient    Supporting Documentation  Indications: pain   Procedure Details  Location: knee - R knee  Preparation: Patient was prepped and draped in the usual sterile fashion  Needle size: 22 G  Ultrasound guidance: no  Approach: anterolateral  Medications administered: 2 mL bupivacaine 0 25 %; 12 mg betamethasone acetate-betamethasone sodium phosphate 6 (3-3) mg/mL    Patient tolerance: patient tolerated the procedure well with no immediate complications  Dressing:  Sterile dressing applied                 Dawna Kussmaul, PA-C - assisting  Bhavna Villasenor              Portions of the record may have been created with voice recognition software  Occasional wrong word or "sound a like" substitutions may have occurred due to the inherent limitations of voice recognition software  Read the chart carefully and recognize, using context, where substitutions have occurred

## 2023-01-05 ENCOUNTER — EVALUATION (OUTPATIENT)
Dept: PHYSICAL THERAPY | Facility: CLINIC | Age: 58
End: 2023-01-05

## 2023-01-05 DIAGNOSIS — S83.281D TEAR OF LATERAL MENISCUS OF RIGHT KNEE, UNSPECIFIED TEAR TYPE, UNSPECIFIED WHETHER OLD OR CURRENT TEAR, SUBSEQUENT ENCOUNTER: Primary | ICD-10-CM

## 2023-01-05 NOTE — PROGRESS NOTES
PT Evaluation     Today's date: 2023  Patient name: Eliel Rajput  : 1965  MRN: [id number]  Referring provider: HERNANDEZ Jenkins*  Dx:   Encounter Diagnosis     ICD-10-CM    1  Tear of lateral meniscus of right knee, unspecified tear type, unspecified whether old or current tear, subsequent encounter  S83 281D Ambulatory Referral to Physical Therapy          Start Time: 1400  Stop Time: 1500  Total time in clinic (min): 60 minutes    Assessment  Assessment details: Eliel Rajput is a a pleasant 62 y o  male who presents today with pain, decreased strength, decreased ROM, decreased joint mobility, joint effusion, ambulatory dysfunction and balance dysfunction  The patient's clinical presentation is consistent with his diagnosis of R meniscus tear  Alan complains of aching and throbbing pain in the right knee ranging from 2/10 to 9/10  Pain is exacerbated by activity or standing and made better by ice, medication or rest     The patient demonstrates minimally decreased strength during resisted muscle testing  Pt ROM is moderately decreased with empty end feel  The patient has difficulty with ambulation, transfers, leisure, athletics and work  Patient will benefit from skilled physical therapy, including therapeutic exercise, stretching, balance training, manual therapy and modalities prn to improve their level of function, to increase overall quality of life, and to address his impairments  Impairments: abnormal coordination, abnormal gait, abnormal muscle firing, abnormal or restricted ROM, abnormal movement, activity intolerance, impaired balance, impaired physical strength, lacks appropriate home exercise program, pain with function, safety issue, weight-bearing intolerance, poor posture  and poor body mechanics  Understanding of Dx/Px/POC: excellent  Goals  ST  Independent with HEP in 2 weeks  2  Pt will have verbal report of improvement in symptoms by >/=25% in 2 weeks    3  Decrease pain to 6/10 at it's worst   4  Increase strength by 1/2 grade in all deficient planes  To be achieved by D/C   LT  Pt will improve FOTO score by >/= goal points in 6 weeks  2  Pt will improve FOTO score to >/= goal score by visit # 12   3  Pt will be able to perform his usual work activities with little to no difficulty  4  Pt will be able to walk a mile with little to no difficulty  5  Pt will be able to stand for an hour with little to no difficulty  Plan  Patient would benefit from: skilled PT  Planned modality interventions: cryotherapy, hydrotherapy, TENS and unattended electrical stimulation  Planned therapy interventions: activity modification, ADL retraining, balance, balance/weight bearing training, behavior modification, body mechanics training, functional ROM exercises, gait training, home exercise program, IADL retraining, joint mobilization, manual therapy, massage, neuromuscular re-education, patient education, strengthening, stretching, therapeutic activities, therapeutic exercise and transfer training  Frequency: 2x week  Duration in visits: 6  Duration in weeks: 12  Plan of Care beginning date: 2023  Plan of Care expiration date: 2023  Treatment plan discussed with: patient        Subjective Evaluation    History of Present Illness  Mechanism of injury: Kt Albarado presents today with complaints of R knee pain that began atraumatically  Mechanism of injury was atraumatic  He notes that he had plantar fasciitis in November, so he was compensating with his gait  After that, he started having knee pain  Pt had X-ray performed on  that shows the following: No acute osseous abnormality  The patient also reports diminished strength, decreased ROM, decreased balance, decreased endurance, disrupted sleep and difficulty with function  Relevant PMH includes plantar fasciitis in R foot    Pain  Current pain ratin  At best pain ratin  At worst pain rating: 9  Quality: dull ache, throbbing and sharp  Relieving factors: ice, medications and rest  Aggravating factors: standing, walking and stair climbing  Progression: worsening      Diagnostic Tests  X-ray: normal  Treatments  No previous or current treatments  Patient Goals  Patient goals for therapy: decreased edema, decreased pain, increased motion, improved balance, return to sport/leisure activities, independence with ADLs/IADLs, increased strength and return to work          Objective     Passive Range of Motion   Left Knee   Flexion: 120 degrees   Extension: 0 degrees     Right Knee   Flexion: 115 degrees with pain  Extension: 0 degrees with pain    Strength/Myotome Testing     Left Knee   Flexion: 4+  Extension: 4+    Right Knee   Flexion: 4  Extension: 4             Precautions: N/A    Access Code: [email or web address]  URL: [email or web address]  Date: 01/05/2023  Prepared by: Mechelle Hinojosa    Exercises  • Small Range Straight Leg Raise - 1 x daily - 7 x weekly - 3 sets - 10 reps  • Clamshell with Resistance - 1 x daily - 7 x weekly - 3 sets - 10 reps  • Supine Bridge with Resistance Band - 1 x daily - 7 x weekly - 3 sets - 10 reps  • Supine Ankle Inversion Eversion AROM - 1 x daily - 7 x weekly - 3 sets - 10 reps  • Standing Heel Raise with Support - 1 x daily - 7 x weekly - 3 sets - 10 reps  • Standing Toe Raises at Chair - 1 x daily - 7 x weekly - 3 sets - 10 reps  • Ice - 1 x daily - 7 x weekly - 3 sets - 10 reps    Patient Education  • Meniscus Tear    Manuals 1/5            Knee PROM             Tibiofemoral distr  MWM                                       Neuro Re-Ed             Rockerboard             SLS             SL clock             StS walk on foam TB             Bosu Balance                          Ther Ex             Nustep                          Monster walk             Lateral walk             Standing hip 3 way             Bridge w/ alt   DF PF 3x6            Clamshell 3x10            SLR 3 way Flex 3x8            HL ankle inv/ev 3x20            Heel raise HEP            Toe raise HEP            Matrix HS             Matrix Quad             Resisted walk             TKE             Ther Activity             TG SL             FSU             FSD             LSU             LSD             Gait Training                                       Modalities

## 2023-01-09 ENCOUNTER — TELEPHONE (OUTPATIENT)
Dept: OBGYN CLINIC | Facility: HOSPITAL | Age: 58
End: 2023-01-09

## 2023-01-09 ENCOUNTER — OFFICE VISIT (OUTPATIENT)
Dept: PHYSICAL THERAPY | Facility: CLINIC | Age: 58
End: 2023-01-09

## 2023-01-09 DIAGNOSIS — S83.281D TEAR OF LATERAL MENISCUS OF RIGHT KNEE, UNSPECIFIED TEAR TYPE, UNSPECIFIED WHETHER OLD OR CURRENT TEAR, SUBSEQUENT ENCOUNTER: Primary | ICD-10-CM

## 2023-01-09 NOTE — PROGRESS NOTES
Daily Note     Today's date: 2023  Patient name: Qi Hull  : 1965  MRN: [id number]  Referring provider: HERNANDEZ Moralez*  Dx:   Encounter Diagnosis     ICD-10-CM    1  Tear of lateral meniscus of right knee, unspecified tear type, unspecified whether old or current tear, subsequent encounter  S83 281D           Start Time: 1119  Stop Time: 1202  Total time in clinic (min): 43 minutes    Subjective: Pt reported upon arrival that his R knee has some p! That comes and goes  Pt noted that the current HEP exercises are going well and causing no R knee discomfort  Pt noted that his brace has been helping him feel more stable  Objective: See treatment diary below      Assessment: Continued with treatment session, Tolerated treatment fairly well  Verbal cues required for proper form and technique  Pt did not have some challenge with front step ups  Pt advised to continue with SLR with focus on quad strengthening and avoid quad lag on RLE  Patient exhibited good technique with therapeutic exercises and would benefit from continued PT  Pt noted that he has no challenge noted with clamshells with GTB in hooklying  S/P treatment session, noted no significant changes  Pt educated that he can use CP/ice to help with any pain, discomfort and or muscle soreness for 10 min on and 60 min off prior to reapplication  DOMS reviewed and acknowledged by patient may have 24 to 48 hours of muscle soreness following treatment session       Plan: Continue per plan of care  Precautions: N/A    Access Code: [email or web address]  URL: [email or web address]  Date: 2023  Prepared by: Trenton Boron    Exercises  • Small Range Straight Leg Raise - 1 x daily - 7 x weekly - 3 sets - 10 reps  • Clamshell with Resistance - 1 x daily - 7 x weekly - 3 sets - 10 reps  • Supine Bridge with Resistance Band - 1 x daily - 7 x weekly - 3 sets - 10 reps  • Supine Ankle Inversion Eversion AROM - 1 x daily - 7 x weekly - 3 sets - 10 reps  • Standing Heel Raise with Support - 1 x daily - 7 x weekly - 3 sets - 10 reps  • Standing Toe Raises at Chair - 1 x daily - 7 x weekly - 3 sets - 10 reps  • Ice - 1 x daily - 7 x weekly - 3 sets - 10 reps    Patient Education  • Meniscus Tear    Manuals 1/5 1/9           Knee PROM             Tibiofemoral distr  MWM                                       Neuro Re-Ed             Rockerboard             SLS             SL clock             StS walk on foam TB             Bosu Balance                          Ther Ex             Nustep  10 min L5 LE only                         Monster walk             Lateral walk             Standing hip 3 way             Bridge w/ alt   DF PF 3x6 2x 10 3" hold             Clamshell 3x10 GTB 5" hold 3x 10 in hooklying            S/L clamshells   NV           SLR 3 way Flex 3x8 2x 10 Flexion            HL ankle inv/ev 3x20 HEP only            Heel raise HEP HEP only            Toe raise HEP HEP only            Matrix HS  NV           Matrix Quad  NV           Resisted walk             TKE             Ther Activity             TG SL  L22 B/L  2x 10            FSU  6" 2x 10            FSD             LSU             LSD             Gait Training                                       Modalities

## 2023-01-09 NOTE — TELEPHONE ENCOUNTER
Caller: patient  Doctor: Coty Johns    Reason for call: patient would like a cb from Dr Coty Johns     Thank you    Call back#:  [phone]

## 2023-01-10 NOTE — TELEPHONE ENCOUNTER
Caller: Patient    Doctor: [facility]     Reason for call: Patient returning call to speak with [facility] about rtw note   DId not provide any further information     Call back#: [phone]

## 2023-01-10 NOTE — TELEPHONE ENCOUNTER
Caller: Patient    Doctor: Trent Castrejon    Reason for call: Patient called in stating that his return work note has to have a specific date for his return  Patient would like to know if that date can be after his follow up with the provider on 1/7 and would also need to be faxed over to the patient employer  Please let the patient know when the work note is revised and faxed over  Thank you!     Fax#: [phone]

## 2023-01-12 ENCOUNTER — OFFICE VISIT (OUTPATIENT)
Dept: PHYSICAL THERAPY | Facility: CLINIC | Age: 58
End: 2023-01-12

## 2023-01-12 DIAGNOSIS — S83.281D TEAR OF LATERAL MENISCUS OF RIGHT KNEE, UNSPECIFIED TEAR TYPE, UNSPECIFIED WHETHER OLD OR CURRENT TEAR, SUBSEQUENT ENCOUNTER: Primary | ICD-10-CM

## 2023-01-12 NOTE — PROGRESS NOTES
Daily Note    Today's date: 23  Patient name: Gorge Farmer  : 1965  MRN: [id number]  Referring provider: HERNANDEZ Jimenez*  Dx:   Encounter Diagnosis     ICD-10-CM    1  Tear of lateral meniscus of right knee, unspecified tear type, unspecified whether old or current tear, subsequent encounter  S83 281D           Start Time: 1000  Stop Time: 1045  Total time in clinic (min): 45 minutes      Subjective: Sarahi Cole reports feeling pretty sore today  Objective: See treatment diary below  Assessment: Alan tolerated treatment well with consistent cuing throughout  TE's were performed with increased resistance and increased reps  New TE's were demonstrated with proper technique, and tolerated well  Following treatment, the patient demonstrated fatigue post treatment, exhibited good technique with therapeutic exercises and would benefit from continued PT  Plan: Continue per plan of care  Progress treatment as tolerated  Precautions: N/A    Access Code: [email or web address]  URL: [email or web address]  Date: 2023  Prepared by: Watson Apley    Exercises  • Small Range Straight Leg Raise - 1 x daily - 7 x weekly - 3 sets - 10 reps  • Clamshell with Resistance - 1 x daily - 7 x weekly - 3 sets - 10 reps  • Supine Bridge with Resistance Band - 1 x daily - 7 x weekly - 3 sets - 10 reps  • Supine Ankle Inversion Eversion AROM - 1 x daily - 7 x weekly - 3 sets - 10 reps  • Standing Heel Raise with Support - 1 x daily - 7 x weekly - 3 sets - 10 reps  • Standing Toe Raises at Chair - 1 x daily - 7 x weekly - 3 sets - 10 reps  • Ice - 1 x daily - 7 x weekly - 3 sets - 10 reps    Patient Education  • Meniscus Tear    Manuals           Knee PROM             Tibiofemoral distr   MWM                                       Neuro Re-Ed             Rockerboard             SLS             SL clock             StS walk on foam TB             Bosu Balance                          Ther Ex Nustep  10 min L5 LE only  10' L6 LE only                       Monster walk             Lateral walk             Standing hip 3 way             Bridge w/ alt   DF PF 3x6 2x 10 3" hold   2x10 3" hold          Clamshell 3x10 GTB 5" hold 3x 10 in hooklying  GTB 5" 3x10           S/L clamshells   NV           SLR 3 way Flex 3x8 2x 10 Flexion  Held          HL ankle inv/ev 3x20 HEP only            Heel raise HEP HEP only            Toe raise HEP HEP only            Matrix HS  NV 3x15 35#          Matrix Quad  NV 3x10 15#          Resisted walk             TKE   2x20 supine          Ther Activity             TG SL  L22 B/L  2x 10  L22 3x10          FSU  6" 2x 10            FSD             LSU             LSD             Gait Training                                       Modalities                                            BUDDY Barba, PT  1/12/2023,10:53 AM

## 2023-01-16 ENCOUNTER — OFFICE VISIT (OUTPATIENT)
Dept: PHYSICAL THERAPY | Facility: CLINIC | Age: 58
End: 2023-01-16

## 2023-01-16 DIAGNOSIS — S83.281D TEAR OF LATERAL MENISCUS OF RIGHT KNEE, UNSPECIFIED TEAR TYPE, UNSPECIFIED WHETHER OLD OR CURRENT TEAR, SUBSEQUENT ENCOUNTER: Primary | ICD-10-CM

## 2023-01-16 NOTE — PROGRESS NOTES
Daily Note     Today's date: 2023  Patient name: David Reyes  : 1965  MRN: [id number]  Referring provider: HERNANDEZ Donohue*  Dx:   Encounter Diagnosis     ICD-10-CM    1  Tear of lateral meniscus of right knee, unspecified tear type, unspecified whether old or current tear, subsequent encounter  E74 269V                      Subjective: Patient reports some discomfort in L side PSIS as well as intermittent cramping in R calf  He said overall he is feeling better but his R LE feels weaker/tired  Objective: See treatment diary below      Assessment: Tolerated treatment well  Cues throughout for technique and tempo  Patient was able to make small progressions in resistance as charted  Quad lag noted with SLR as he fatigues but he noted this was not painful as it was a few visits ago  Leg ext/curls performed with a focus on eccentric to challenge NM control  Standing gastroc stretch added to address restriction possibly due to gait compensations  Patient demonstrated fatigue post treatment and would benefit from continued PT      Plan: Progress treatment as tolerated  Precautions: N/A    Access Code: [email or web address]  URL: [email or web address]  Date: 2023  Prepared by: Liseth Boudreauxberg    Exercises  • Small Range Straight Leg Raise - 1 x daily - 7 x weekly - 3 sets - 10 reps  • Clamshell with Resistance - 1 x daily - 7 x weekly - 3 sets - 10 reps  • Supine Bridge with Resistance Band - 1 x daily - 7 x weekly - 3 sets - 10 reps  • Supine Ankle Inversion Eversion AROM - 1 x daily - 7 x weekly - 3 sets - 10 reps  • Standing Heel Raise with Support - 1 x daily - 7 x weekly - 3 sets - 10 reps  • Standing Toe Raises at Chair - 1 x daily - 7 x weekly - 3 sets - 10 reps  • Ice - 1 x daily - 7 x weekly - 3 sets - 10 reps    Patient Education  • Meniscus Tear    Manuals          Knee PROM             Tibiofemoral distr   MWM                                       Neuro Re-Ed             Rockerboard             SLS             SL clock             StS walk on foam TB             Bosu Balance                          Ther Ex             Nustep  10 min L5 LE only  10' L6 LE only 10' L6 LE only         Standing calf stretch    30"x3          Monster walk             Lateral walk             Standing hip 3 way             Bridge w/ alt   DF PF 3x6 2x 10 3" hold   2x10 3" hold BTB 2x10 3" hold         Clamshell 3x10 GTB 5" hold 3x 10 in hooklying  GTB 5" 3x10           S/L clamshells   NV  BTB 5" 3x10 ea         SLR 3 way Flex 3x8 2x 10 Flexion  Held 2x10 flexion          HL ankle inv/ev 3x20 HEP only            Heel raise HEP HEP only            Toe raise HEP HEP only            Matrix HS  NV 3x15 35# 35# 1x10 DL  2x10 2 up 1 down         Matrix Quad  NV 3x10 15# 15# 1x10 DL  2x10 2 up 1 down         Resisted walk             TKE   2x20 supine 10"x10 BTB          Ther Activity             TG SL  L22 B/L  2x 10  L22 3x10 L22 2x15          FSU  6" 2x 10   6" 2x10         FSD             LSU             LSD    4" 2x10         Gait Training                                       Modalities

## 2023-01-18 NOTE — LETTER
April 29, 2022     Patient: Leeroy Vicente  YOB: 1965  Date of Visit: 4/29/2022      To Whom it May Concern:     Gilford Clinch is under my professional care  Chemalea Dent was seen in my office on 4/29/2022  Lamont Dent may return to work on 5/3/22  If you have any questions or concerns, please don't hesitate to call           Sincerely,          Vaishnavi Ross PA-C        CC: No Recipients Abnormal Lactate: > 2

## 2023-01-19 ENCOUNTER — OFFICE VISIT (OUTPATIENT)
Dept: PHYSICAL THERAPY | Facility: CLINIC | Age: 58
End: 2023-01-19

## 2023-01-19 DIAGNOSIS — S83.281D TEAR OF LATERAL MENISCUS OF RIGHT KNEE, UNSPECIFIED TEAR TYPE, UNSPECIFIED WHETHER OLD OR CURRENT TEAR, SUBSEQUENT ENCOUNTER: Primary | ICD-10-CM

## 2023-01-19 NOTE — PROGRESS NOTES
Daily Note    Today's date: 23  Patient name: Marylou Jhaveri  : 1965  MRN: [id number]  Referring provider: HERNANDEZ La*  Dx:   Encounter Diagnosis     ICD-10-CM    1  Tear of lateral meniscus of right knee, unspecified tear type, unspecified whether old or current tear, subsequent encounter  S83 281D           Start Time: 0915  Stop Time: 1000  Total time in clinic (min): 45 minutes      Subjective: Roman Michel reports that he has been feeling better overall  He notes little pain and stiffness when he wakes up, but it works itself out as he is up and moving  Objective: See treatment diary below  Assessment: Alan tolerated treatment well with consistent cuing throughout  TE's were performed with increased resistance and increased reps  New TE's were demonstrated with proper technique, and tolerated well  Following treatment, the patient demonstrated fatigue post treatment, exhibited good technique with therapeutic exercises and would benefit from continued PT  Plan: Continue per plan of care  Progress treatment as tolerated  Precautions: N/A    Access Code: [email or web address]  URL: [email or web address]  Date: 2023  Prepared by: Niesha Tovar    Exercises  • Small Range Straight Leg Raise - 1 x daily - 7 x weekly - 3 sets - 10 reps  • Clamshell with Resistance - 1 x daily - 7 x weekly - 3 sets - 10 reps  • Supine Bridge with Resistance Band - 1 x daily - 7 x weekly - 3 sets - 10 reps  • Supine Ankle Inversion Eversion AROM - 1 x daily - 7 x weekly - 3 sets - 10 reps  • Standing Heel Raise with Support - 1 x daily - 7 x weekly - 3 sets - 10 reps  • Standing Toe Raises at Chair - 1 x daily - 7 x weekly - 3 sets - 10 reps  • Ice - 1 x daily - 7 x weekly - 3 sets - 10 reps    Patient Education  • Meniscus Tear    Manuals         Knee PROM             Tibiofemoral distr   MWM                                       Neuro Re-Ed             Rockerboard SLS             SL clock             StS walk on foam TB             Bosu Balance                          Ther Ex             Nustep  10 min L5 LE only  10' L6 LE only 10' L6 LE only 10' L7 no UE        Standing calf stretch    30"x3          Monster walk             Lateral walk             Standing hip 3 way             Bridge w/ alt   DF PF 3x6 2x 10 3" hold   2x10 3" hold BTB 2x10 3" hold BTB 3x10         Clamshell 3x10 GTB 5" hold 3x 10 in hooklying  GTB 5" 3x10   BTB 3x10        S/L clamshells   NV  BTB 5" 3x10 ea         SLR 3 way Flex 3x8 2x 10 Flexion  Held 2x10 flexion  3x10 flexion        HL ankle inv/ev 3x20 HEP only            Heel raise HEP HEP only            Toe raise HEP HEP only            Matrix HS  NV 3x15 35# 35# 1x10 DL  2x10 2 up 1 down 35# 3x10 2 up 1 down        Matrix Quad  NV 3x10 15# 15# 1x10 DL  2x10 2 up 1 down 25# 3x8 2 up 1 down        TKE   2x20 supine 10"x10 BTB  [place] 16# 2x20        Ther Activity             TG SL  L22 B/L  2x 10  L22 3x10 L22 2x15  L22 3x12        FSU  6" 2x 10   6" 2x10 6" 2x10        FSD             LSU             LSD    4" 2x10         Resisted BW Walking     11# 10x BW        Gait Training                                       Modalities                                            BUDDY Barba, PT  1/19/2023,10:06 AM

## 2023-01-23 ENCOUNTER — OFFICE VISIT (OUTPATIENT)
Dept: PHYSICAL THERAPY | Facility: CLINIC | Age: 58
End: 2023-01-23

## 2023-01-23 DIAGNOSIS — S83.281D TEAR OF LATERAL MENISCUS OF RIGHT KNEE, UNSPECIFIED TEAR TYPE, UNSPECIFIED WHETHER OLD OR CURRENT TEAR, SUBSEQUENT ENCOUNTER: Primary | ICD-10-CM

## 2023-01-23 NOTE — PROGRESS NOTES
Daily Note    Today's date: 23  Patient name: Preston Mora  : 1965  MRN: [id number]  Referring provider: HERNANDEZ Pichardo*  Dx:   Encounter Diagnosis     ICD-10-CM    1  Tear of lateral meniscus of right knee, unspecified tear type, unspecified whether old or current tear, subsequent encounter  S71 988G                        Subjective: Lacey reports feeling better overall  He notes adherence to HEP  Objective: See treatment diary below  Assessment: Alan tolerated treatment well with consistent cuing throughout  TE's were performed with increased resistance and increased reps  New TE's were demonstrated with proper technique, and tolerated well  Following treatment, the patient demonstrated fatigue post treatment, exhibited good technique with therapeutic exercises and would benefit from continued PT  Plan: Continue per plan of care  Progress treatment as tolerated  Precautions: N/A    Access Code: [email or web address]  URL: [email or web address]  Date: 2023  Prepared by: Rich Almaguer    Exercises  • Small Range Straight Leg Raise - 1 x daily - 7 x weekly - 3 sets - 10 reps  • Clamshell with Resistance - 1 x daily - 7 x weekly - 3 sets - 10 reps  • Supine Bridge with Resistance Band - 1 x daily - 7 x weekly - 3 sets - 10 reps  • Supine Ankle Inversion Eversion AROM - 1 x daily - 7 x weekly - 3 sets - 10 reps  • Standing Heel Raise with Support - 1 x daily - 7 x weekly - 3 sets - 10 reps  • Standing Toe Raises at Chair - 1 x daily - 7 x weekly - 3 sets - 10 reps  • Ice - 1 x daily - 7 x weekly - 3 sets - 10 reps    Patient Education  • Meniscus Tear    Manuals        Knee PROM             Tibiofemoral distr   MWM                                       Neuro Re-Ed             Rockerboard             SLS             SL clock             StS walk on foam TB             Bosu Balance                          Ther Ex             Nustep  10 min L5 LE only  10' L6 LE only 10' L6 LE only 10' L7 no UE 10' L7 no UE       Standing calf stretch    30"x3          Monster walk             Lateral walk             Standing hip 3 way             Bridge w/ alt   DF PF 3x6 2x 10 3" hold   2x10 3" hold BTB 2x10 3" hold BTB 3x10  BTB 3x10       Clamshell 3x10 GTB 5" hold 3x 10 in hooklying  GTB 5" 3x10   BTB 3x10 BTB 3x10       S/L clamshells   NV  BTB 5" 3x10 ea         SLR 3 way Flex 3x8 2x 10 Flexion  Held 2x10 flexion  3x10 flexion 3x10       HL ankle inv/ev 3x20 HEP only            Heel raise HEP HEP only            Toe raise HEP HEP only            Matrix HS  NV 3x15 35# 35# 1x10 DL  2x10 2 up 1 down 35# 3x10 2 up 1 down 55# 3x10 2 up 1 down       Matrix Quad  NV 3x10 15# 15# 1x10 DL  2x10 2 up 1 down 25# 3x8 2 up 1 down 25# 3x8 2 up 1 down       TKE   2x20 supine 10"x10 BTB  [place]arius[place] 16# 2x20 [place] 16# 2x20       Ther Activity             TG SL  L22 B/L  2x 10  L22 3x10 L22 2x15  L22 3x12 L22 3x12       FSU  6" 2x 10   6" 2x10 6" 2x10        FSD             LSU             LSD    4" 2x10         Resisted BW Walking     11# 10x BW 13# 10x 4 ways       Gait Training                                       Modalities                                            BUDDY Barba, PT  1/23/2023,9:23 AM

## 2023-01-26 ENCOUNTER — APPOINTMENT (OUTPATIENT)
Dept: PHYSICAL THERAPY | Facility: CLINIC | Age: 58
End: 2023-01-26

## 2023-01-30 ENCOUNTER — OFFICE VISIT (OUTPATIENT)
Dept: PHYSICAL THERAPY | Facility: CLINIC | Age: 58
End: 2023-01-30

## 2023-01-30 DIAGNOSIS — S83.281D TEAR OF LATERAL MENISCUS OF RIGHT KNEE, UNSPECIFIED TEAR TYPE, UNSPECIFIED WHETHER OLD OR CURRENT TEAR, SUBSEQUENT ENCOUNTER: Primary | ICD-10-CM

## 2023-01-30 NOTE — PROGRESS NOTES
Daily Note    Today's date: 23  Patient name: Clayton Phillips  : 1965  MRN: [id number]  Referring provider: HERNANDEZ Hyman*  Dx:   Encounter Diagnosis     ICD-10-CM    1  Tear of lateral meniscus of right knee, unspecified tear type, unspecified whether old or current tear, subsequent encounter  S83 281D           Start Time: 930  Stop Time: 1015  Total time in clinic (min): 45 minutes      Subjective: Emmanuelle Alegre reports that his knee is doing much better, and he has not had pain for a few days  Objective: See treatment diary below  Assessment: Alan tolerated treatment well with consistent cuing throughout  TE's were performed with increased resistance and increased reps  New TE's were demonstrated with proper technique, and tolerated well  Following treatment, the patient demonstrated fatigue post treatment, exhibited good technique with therapeutic exercises and would benefit from continued PT  Plan: Continue per plan of care  Progress treatment as tolerated  Precautions: N/A    Access Code: [email or web address]  URL: [email or web address]  Date: 2023  Prepared by: Jaylen Howard    Exercises  • Small Range Straight Leg Raise - 1 x daily - 7 x weekly - 3 sets - 10 reps  • Clamshell with Resistance - 1 x daily - 7 x weekly - 3 sets - 10 reps  • Supine Bridge with Resistance Band - 1 x daily - 7 x weekly - 3 sets - 10 reps  • Supine Ankle Inversion Eversion AROM - 1 x daily - 7 x weekly - 3 sets - 10 reps  • Standing Heel Raise with Support - 1 x daily - 7 x weekly - 3 sets - 10 reps  • Standing Toe Raises at Chair - 1 x daily - 7 x weekly - 3 sets - 10 reps  • Ice - 1 x daily - 7 x weekly - 3 sets - 10 reps    Patient Education  • Meniscus Tear    Manuals       Knee PROM             Tibiofemoral distr   MWM                                       Neuro Re-Ed             Rockerboard       2'/2'      SLS             SL clock       6x      StS walk on foam TB             Bosu Balance                          Ther Ex             Nustep  10 min L5 LE only  10' L6 LE only 10' L6 LE only 10' L7 no UE 10' L7 no UE 10' L7      BW walking TM       3'      Standing calf stretch    30"x3          Monster walk             Lateral walk             Standing hip 3 way             Bridge w/ alt   DF PF 3x6 2x 10 3" hold   2x10 3" hold BTB 2x10 3" hold BTB 3x10  BTB 3x10       Clamshell 3x10 GTB 5" hold 3x 10 in hooklying  GTB 5" 3x10   BTB 3x10 BTB 3x10       S/L clamshells   NV  BTB 5" 3x10 ea         SLR 3 way Flex 3x8 2x 10 Flexion  Held 2x10 flexion  3x10 flexion 3x10       HL ankle inv/ev 3x20 HEP only            Heel raise HEP HEP only            Toe raise HEP HEP only            Matrix HS  NV 3x15 35# 35# 1x10 DL  2x10 2 up 1 down 35# 3x10 2 up 1 down 55# 3x10 2 up 1 down 55# 3x10 2 up 1 down      Matrix Quad  NV 3x10 15# 15# 1x10 DL  2x10 2 up 1 down 25# 3x8 2 up 1 down 25# 3x8 2 up 1 down 35# 3x8 2 up 1 down      TKE   2x20 supine 10"x10 BTB  [place] 16# 2x20 Mariusz 16# 2x20 [place] 24# 2x20      Ther Activity             TG SL  L22 B/L  2x 10  L22 3x10 L22 2x15  L22 3x12 L22 3x12 L22 3x12      FSU  6" 2x 10   6" 2x10 6" 2x10        FSD             LSU             LSD    4" 2x10         Resisted BW Walking     11# 10x BW 13# 10x 4 ways 17# 10x 4 ways      Gait Training                                       Modalities                                            Yocasta Ratliff, PT  1/30/2023,1:40 PM

## 2023-02-02 ENCOUNTER — EVALUATION (OUTPATIENT)
Dept: PHYSICAL THERAPY | Facility: CLINIC | Age: 58
End: 2023-02-02

## 2023-02-02 DIAGNOSIS — S83.281D TEAR OF LATERAL MENISCUS OF RIGHT KNEE, UNSPECIFIED TEAR TYPE, UNSPECIFIED WHETHER OLD OR CURRENT TEAR, SUBSEQUENT ENCOUNTER: Primary | ICD-10-CM

## 2023-02-02 NOTE — PROGRESS NOTES
PT Evaluation     Today's date: 2023  Patient name: Clayton Phillips  : 1965  MRN: [id number]  Referring provider: HERNANDEZ Hyman*  Dx:   Encounter Diagnosis     ICD-10-CM    1  Tear of lateral meniscus of right knee, unspecified tear type, unspecified whether old or current tear, subsequent encounter  S83 281D           Start Time: 930  Stop Time: 1015  Total time in clinic (min): 45 minutes    Assessment  Assessment details: Clayton Phillips is a a pleasant 62 y o  male who presents today for a re-evaluation of his R knee  Alan complains of aching and dull pain in the right knee ranging from 1/10 to 3/10  Pain is exacerbated by activity or standing and made better by ice or rest     The patient demonstrates minimally decreased strength during resisted muscle testing, which has improved from initial evaluation  Pt ROM is minimally decreased with pain at end ranges  The patient has difficulty with ambulation  Patient will benefit from continued skilled physical therapy, including therapeutic exercise, stretching, balance training, manual therapy and modalities prn to improve their level of function, to increase overall quality of life, and to address his impairments  Emmanuelle Alegre has met some of his goals at this time  Pt was instructed on his updated plan of care and wishes to continue therapy  Impairments: abnormal coordination, abnormal gait, abnormal muscle firing, abnormal or restricted ROM, abnormal movement, activity intolerance, impaired balance, impaired physical strength, lacks appropriate home exercise program, pain with function, safety issue, weight-bearing intolerance, poor posture  and poor body mechanics  Understanding of Dx/Px/POC: excellent  Goals  ST  Independent with HEP in 2 weeks  - GOAL MET  2  Pt will have verbal report of improvement in symptoms by >/=25% in 2 weeks  - GOAL MET  3  Decrease pain to 6/10 at it's worst  - GOAL MET  4   Increase strength by 1/2 grade in all deficient planes  - GOAL MET    To be achieved by D/C   LT  Pt will improve FOTO score by >/= goal points in 6 weeks  - GOAL MET  2  Pt will improve FOTO score to >/= goal score by visit # 12  - GOAL MET  3  Pt will be able to perform his usual work activities with little to no difficulty  - GOAL NOT MET  4  Pt will be able to walk a mile with little to no difficulty  - GOAL MET  5  Pt will be able to stand for an hour with little to no difficulty  - GOAL NOT MET    Plan  Patient would benefit from: skilled PT  Planned modality interventions: cryotherapy, hydrotherapy, TENS and unattended electrical stimulation  Planned therapy interventions: activity modification, ADL retraining, balance, balance/weight bearing training, behavior modification, body mechanics training, functional ROM exercises, gait training, home exercise program, IADL retraining, joint mobilization, manual therapy, massage, neuromuscular re-education, patient education, strengthening, stretching, therapeutic activities, therapeutic exercise and transfer training  Frequency: 2x week  Duration in visits: 6  Duration in weeks: 12  Plan of Care beginning date: 2023  Plan of Care expiration date: 2023  Treatment plan discussed with: patient        Subjective Evaluation    History of Present Illness  Mechanism of injury: Maggie Garcia reports adherence to HEP  He says that overall, he is feeling much better since starting therapy  His pain is no longer sharp and debilitating  He is able to perform some functional activities including stairs and walking for a mile  He tried going for more than a mile, but it took him quite some time and was very sore afterwards  Maggie Garcia reports no pain in the R foot from his plantar fasciitis  He was sore yesterday and today, did not perform anything new  Maggie Garcia reports that therapy is helping and would like to continue working towards more functional goals and activities    Pain  Current pain ratin  At best pain ratin  At worst pain rating: 3  Quality: dull ache and throbbing  Relieving factors: ice and rest  Aggravating factors: standing, walking and stair climbing  Progression: improved      Diagnostic Tests  X-ray: normal  Treatments  No previous or current treatments  Patient Goals  Patient goals for therapy: decreased edema, decreased pain, increased motion, improved balance, return to sport/leisure activities, independence with ADLs/IADLs, increased strength and return to work          Objective     Passive Range of Motion   Left Knee   Flexion: 130 degrees   Extension: 6 degrees     Right Knee   Flexion: 128 degrees with pain  Extension: 4 degrees with pain    Strength/Myotome Testing     Left Hip   Planes of Motion   Flexion: 4+  Abduction: 4+  Adduction: 4+    Right Hip   Planes of Motion   Flexion: 4  Abduction: 4  Adduction: 4+    Left Knee   Flexion: 4+  Extension: 5    Right Knee   Flexion: 4  Extension: 4+              Precautions: N/A    Access Code: [email or web address]  URL: [email or web address]  Date: 2023  Prepared by: Donny Srinivasan    Exercises  • Small Range Straight Leg Raise - 1 x daily - 7 x weekly - 3 sets - 10 reps  • Clamshell with Resistance - 1 x daily - 7 x weekly - 3 sets - 10 reps  • Supine Bridge with Resistance Band - 1 x daily - 7 x weekly - 3 sets - 10 reps  • Supine Ankle Inversion Eversion AROM - 1 x daily - 7 x weekly - 3 sets - 10 reps  • Standing Heel Raise with Support - 1 x daily - 7 x weekly - 3 sets - 10 reps  • Standing Toe Raises at Chair - 1 x daily - 7 x weekly - 3 sets - 10 reps  • Ice - 1 x daily - 7 x weekly - 3 sets - 10 reps    Patient Education  • Meniscus Tear    Manuals 2     Knee PROM             Tibiofemoral distr   MWM                          Re-eval        TS     Neuro Re-Ed             Rockerboard       2'/2'      SLS             SL clock       6x      StS walk on foam TB             Bosu Balance Ther Ex             Nustep  10 min L5 LE only  10' L6 LE only 10' L6 LE only 10' L7 no UE 10' L7 no UE 10' L7 10' upright bike     BW walking TM       3'      Standing calf stretch    30"x3          Monster walk        NV     Lateral walk        NV     Standing hip 3 way             Bridge w/ alt  DF PF 3x6 2x 10 3" hold   2x10 3" hold BTB 2x10 3" hold BTB 3x10  BTB 3x10       Clamshell 3x10 GTB 5" hold 3x 10 in hooklying  GTB 5" 3x10   BTB 3x10 BTB 3x10       S/L clamshells   NV  BTB 5" 3x10 ea         SLR 3 way Flex 3x8 2x 10 Flexion  Held 2x10 flexion  3x10 flexion 3x10       HL ankle inv/ev 3x20 HEP only            Heel raise HEP HEP only            Toe raise HEP HEP only            Matrix HS  NV 3x15 35# 35# 1x10 DL  2x10 2 up 1 down 35# 3x10 2 up 1 down 55# 3x10 2 up 1 down 55# 3x10 2 up 1 down 55# 3x10 2 down 1 up     Matrix Quad  NV 3x10 15# 15# 1x10 DL  2x10 2 up 1 down 25# 3x8 2 up 1 down 25# 3x8 2 up 1 down 35# 3x8 2 up 1 down 35# 3x10 2 up 1 down     TKE   2x20 supine 10"x10 BTB  [place] 16# 2x20 [place] 16# 2x20 [place] 24# 2x20 NV     Ther Activity             TG SL  L22 B/L  2x 10  L22 3x10 L22 2x15  L22 3x12 L22 3x12 L22 3x12      FSU  6" 2x 10   6" 2x10 6" 2x10        FSD             LSU             LSD    4" 2x10         Resisted BW Walking     11# 10x BW 13# 10x 4 ways 17# 10x 4 ways      Side lunge on slider        NV     Lunge w/ lat/med res          NV Mariusz     Gait Training                                       Modalities

## 2023-02-06 ENCOUNTER — OFFICE VISIT (OUTPATIENT)
Dept: PHYSICAL THERAPY | Facility: CLINIC | Age: 58
End: 2023-02-06

## 2023-02-06 DIAGNOSIS — S83.281D TEAR OF LATERAL MENISCUS OF RIGHT KNEE, UNSPECIFIED TEAR TYPE, UNSPECIFIED WHETHER OLD OR CURRENT TEAR, SUBSEQUENT ENCOUNTER: Primary | ICD-10-CM

## 2023-02-06 NOTE — PROGRESS NOTES
Daily Note     Today's date: 2023  Patient name: To Boothe  : 1965  MRN: [id number]  Referring provider: HERNANDEZ Pollack*  Dx:   Encounter Diagnosis     ICD-10-CM    1  Tear of lateral meniscus of right knee, unspecified tear type, unspecified whether old or current tear, subsequent encounter  [id number]                      Subjective: Pt noted that over the weekend he did have some pain in his R knee  Pt noted that the p! Had since subsided and had 0/10 p!         Objective: See treatment diary below      Assessment: Continued with treatment session, Tolerated treatment fairly well with progressions added this visit  Pt was able to complete proper form and technique with minimal verbal cues  Patient exhibited good technique with therapeutic exercises and would benefit from continued PT  S/P treatment session, Noted no significant changes  Education:   - Secondary to progressions advised patient that he may have some increase in DOMS - muscle soreness for 24 to 48 hours s/p PT    - Hep compliant on an every other day basis  Plan: Continue per plan of care  Precautions: N/A    Access Code: [email or web address]  URL: [email or web address]  Date: 2023  Prepared by: Eulice Cools    Exercises  • Small Range Straight Leg Raise - 1 x daily - 7 x weekly - 3 sets - 10 reps  • Clamshell with Resistance - 1 x daily - 7 x weekly - 3 sets - 10 reps  • Supine Bridge with Resistance Band - 1 x daily - 7 x weekly - 3 sets - 10 reps  • Supine Ankle Inversion Eversion AROM - 1 x daily - 7 x weekly - 3 sets - 10 reps  • Standing Heel Raise with Support - 1 x daily - 7 x weekly - 3 sets - 10 reps  • Standing Toe Raises at Chair - 1 x daily - 7 x weekly - 3 sets - 10 reps  • Ice - 1 x daily - 7 x weekly - 3 sets - 10 reps    Patient Education  • Meniscus Tear    Manuals  2/ 2/6     Knee PROM             Tibiofemoral distr   MWM                          Re-eval TS                                                         Neuro Re-Ed             Rockerboard       2'/2'      SLS             SL clock       6x      StS walk on foam TB             Bosu Balance                          Ther Ex             Nustep  10 min L5 LE only  10' L6 LE only 10' L6 LE only 10' L7 no UE 10' L7 no UE 10' L7 10' upright bike 10 upright bike L 1     BW walking TM       3'      Standing calf stretch    30"x3          Monster walk        NV GTB around ankles - 3 laps     Lateral walk        NV GTB around ankles - 3 laps     Standing hip 3 way             Bridge w/ alt  DF PF 3x6 2x 10 3" hold   2x10 3" hold BTB 2x10 3" hold BTB 3x10  BTB 3x10       Clamshell 3x10 GTB 5" hold 3x 10 in hooklying  GTB 5" 3x10   BTB 3x10 BTB 3x10       S/L clamshells   NV  BTB 5" 3x10 ea         SLR 3 way Flex 3x8 2x 10 Flexion  Held 2x10 flexion  3x10 flexion 3x10       HL ankle inv/ev 3x20 HEP only            Heel raise HEP HEP only            Toe raise HEP HEP only            Matrix HS  NV 3x15 35# 35# 1x10 DL  2x10 2 up 1 down 35# 3x10 2 up 1 down 55# 3x10 2 up 1 down 55# 3x10 2 up 1 down 55# 3x10 2 down 1 up 55# 3x10 2 down 1 up    Matrix Quad  NV 3x10 15# 15# 1x10 DL  2x10 2 up 1 down 25# 3x8 2 up 1 down 25# 3x8 2 up 1 down 35# 3x8 2 up 1 down 35# 3x10 2 up 1 down 35# 3x10 2 up 1 down    TKE   2x20 supine 10"x10 BTB  [place] 16# 2x20 Mariusz 16# 2x20 Mariusz 24# 2x20 NV     Ther Activity             TG SL  L22 B/L  2x 10  L22 3x10 L22 2x15  L22 3x12 L22 3x12 L22 3x12      FSU  6" 2x 10   6" 2x10 6" 2x10        FSD             LSU             LSD    4" 2x10         Resisted BW Walking     11# 10x BW 13# 10x 4 ways 17# 10x 4 ways      Side lunge on slider        NV 2x 10  Side and bwd     Lunge w/ lat/med res          NV [place]     Gait Training                                       Modalities

## 2023-02-07 ENCOUNTER — OFFICE VISIT (OUTPATIENT)
Dept: OBGYN CLINIC | Facility: CLINIC | Age: 58
End: 2023-02-07

## 2023-02-07 VITALS
DIASTOLIC BLOOD PRESSURE: 107 MMHG | SYSTOLIC BLOOD PRESSURE: 162 MMHG | BODY MASS INDEX: 38.77 KG/M2 | WEIGHT: 255.8 LBS | HEIGHT: 68 IN | HEART RATE: 82 BPM

## 2023-02-07 DIAGNOSIS — M23.91 INTERNAL DERANGEMENT OF RIGHT KNEE: Primary | ICD-10-CM

## 2023-02-07 RX ORDER — MELOXICAM 15 MG/1
15 TABLET ORAL DAILY PRN
Qty: 30 TABLET | Refills: 1 | Status: SHIPPED | OUTPATIENT
Start: 2023-02-07

## 2023-02-07 NOTE — LETTER
February 7, 2023     Patient: Marylou Jhaveri  YOB: 1965  Date of Visit: 2/7/2023      To Whom it May Concern:    Herrerabarry Dee is under my professional care  Roman Michel was seen in my office on 2/7/2023  Roman Michel not cleared for return to work on 2/14/23  MRI of the right knee ordered for further evaluation  Return to work is pending results of MRI  If you have any questions or concerns, please don't hesitate to call           Sincerely,          Wallace March MD

## 2023-02-07 NOTE — PROGRESS NOTES
Orthopaedics Office Visit - follow up Patient Visit    ASSESSMENT/PLAN:    Assessment:   Right knee patellofemoral OA   suspected meniscus tear s/p acute injury - pain has now become localized to posteromedial joint line despite 6 wks of PT and corticosteroid injection  Internal derangement right knee    Plan:   · Continue physical therapy and physician directed home exercises   · MRI of the right knee ordered for further evaluation of a suspected mensicus tear  · I offered to order visco supplementation injections to provide the patient with symptoms relief  Patient declined at this time  · Mobic prescribed   · Work note provided  Patient is not cleared to return to work pending MRI results  · Follow up after MRI     To Do Next Visit:  Review MRI results & discuss next steps regarding treatment     _____________________________________________________  CHIEF COMPLAINT:  Chief Complaint   Patient presents with   • Right Knee - Follow-up         SUBJECTIVE:  Kam Ospina is a 62 y o  male who presents for follow up evaluation of his right knee  He completed PT and notes significant improvement in his symptoms  However, he does complain of intermittent soreness in the medial aspect of his knee  Patient also expressed pain with prolonged activity such as walking  CSI on 12/29/22 provided the patient with no relief in his symptoms  He also is compliant with wearing a hinged knee brace to help support the right knee      PAST MEDICAL HISTORY:  Past Medical History:   Diagnosis Date   • Dyspnea on exertion     last assessed: 3/11/2016   • Hypertension    • Major depressive disorder, single episode, moderate ([facility] [address] ) 12/19/2016       PAST SURGICAL HISTORY:  Past Surgical History:   Procedure Laterality Date   • FIBULA FRACTURE SURGERY     • HERNIA REPAIR     • TONSILLECTOMY         FAMILY HISTORY:  Family History   Problem Relation Age of Onset   • Leukemia Mother         acute myeloid   • Hypertension Mother    • Multiple myeloma Mother    • Diabetes Father         mellitus   • Hypertension Father    • Pneumonia Father         NSIP (nonspecific interstital pneumonia)       SOCIAL HISTORY:  Social History     Tobacco Use   • Smoking status: Never   • Smokeless tobacco: Never   Vaping Use   • Vaping Use: Never used   Substance Use Topics   • Alcohol use: Not Currently     Comment: no alchol use (as per allscripts)   • Drug use: Yes     Types: Marijuana       MEDICATIONS:    Current Outpatient Medications:   •  albuterol (PROVENTIL HFA,VENTOLIN HFA) 90 mcg/act inhaler, Inhale 2 puffs every 6 (six) hours as needed for wheezing, Disp: 6 7 g, Rfl: 3  •  amLODIPine (NORVASC) 10 mg tablet, TAKE 1 TABLET BY MOUTH EVERY DAY, Disp: 30 tablet, Rfl: 5  •  ergocalciferol (VITAMIN D2) 50,000 units, TAKE 1 CAPSULE BY MOUTH ONE TIME PER WEEK, Disp: 4 capsule, Rfl: 1  •  losartan (COZAAR) 100 MG tablet, TAKE 1 TABLET BY MOUTH EVERY DAY, Disp: 30 tablet, Rfl: 5  •  meloxicam (Mobic) 15 mg tablet, Take 1 tablet (15 mg total) by mouth daily as needed (knee pain), Disp: 30 tablet, Rfl: 1  •  methylPREDNISolone 4 MG tablet therapy pack, Use as directed on package (Patient taking differently: if needed Use as directed on package), Disp: 21 tablet, Rfl: 0  •  tadalafil (CIALIS) 10 MG tablet, Take 1 tablet (10 mg total) by mouth daily as needed for erectile dysfunction, Disp: 10 tablet, Rfl: 2  •  allopurinol (ZYLOPRIM) 100 mg tablet, Take 2 tablets (200 mg total) by mouth daily (Patient not taking: Reported on 12/12/2022), Disp: 60 tablet, Rfl: 5    ALLERGIES:  No Known Allergies    REVIEW OF SYSTEMS:  MSK: see HPI  Neuro: WNL  Pertinent items are otherwise noted in HPI  A comprehensive review of systems was otherwise negative      LABS:  HgA1c:   Lab Results   Component Value Date    HGBA1C 6 1 (H) 09/26/2022     BMP:   Lab Results   Component Value Date    CALCIUM 9 7 09/26/2022     12/29/2017    K 4 5 09/26/2022    CO2 31 09/26/2022    CL 104 09/26/2022    BUN 25 09/26/2022    CREATININE 1 39 (H) 09/26/2022     CBC: No components found for: CBC    _____________________________________________________  PHYSICAL EXAMINATION:  Vital signs: BP (!) 162/107   Pulse 82   Ht 5' 8" (1 727 m)   Wt 116 kg (255 lb 12 8 oz)   BMI 38 89 kg/m²   General: No acute distress, awake and alert  Psychiatric: Mood and affect appear appropriate  HEENT: Trachea Midline, No torticollis, no apparent facial trauma  Cardiovascular: No audible murmurs; Extremities appear perfused  Pulmonary: No audible wheezing or stridor  Skin: No open lesions; see further details (if any) below    MUSCULOSKELETAL EXAMINATION:  Extremities:  Right knee   - Patient sitting comfortably in the office in no acute distress   -No acute visible abnormalities present in the right knee   Extremity appears well-perfused overall   -Tenderness to palpation over the medial joint line   No other bony or soft tissue tenderness to palpation noted at this time   -Full active range of motion and strength of the right knee without pain   - Special tests:       + McMurrays        Ligaments stable on exam   - NV intact distally   _____________________________________________________  STUDIES REVIEWED:  I personally reviewed the images and interpretation is as follows: No new images to review at today's appointment       PROCEDURES PERFORMED:  Procedures    None       Scribe Attestation    I,:  Maite Gonzales am acting as a scribe while in the presence of the attending physician :       I,:  Noe Garnett MD personally performed the services described in this documentation    as scribed in my presence :

## 2023-02-09 ENCOUNTER — APPOINTMENT (OUTPATIENT)
Dept: PHYSICAL THERAPY | Facility: CLINIC | Age: 58
End: 2023-02-09

## 2023-02-13 ENCOUNTER — APPOINTMENT (OUTPATIENT)
Dept: PHYSICAL THERAPY | Facility: CLINIC | Age: 58
End: 2023-02-13

## 2023-02-16 ENCOUNTER — OFFICE VISIT (OUTPATIENT)
Dept: PHYSICAL THERAPY | Facility: CLINIC | Age: 58
End: 2023-02-16

## 2023-02-16 DIAGNOSIS — S83.281D TEAR OF LATERAL MENISCUS OF RIGHT KNEE, UNSPECIFIED TEAR TYPE, UNSPECIFIED WHETHER OLD OR CURRENT TEAR, SUBSEQUENT ENCOUNTER: Primary | ICD-10-CM

## 2023-02-16 NOTE — PROGRESS NOTES
Daily Note    Today's date: 23  Patient name: Yoan Conti  : 1965  MRN: [id number]  Referring provider: HERNANDEZ Gannon*  Dx:   Encounter Diagnosis     ICD-10-CM    1  Tear of lateral meniscus of right knee, unspecified tear type, unspecified whether old or current tear, subsequent encounter  S83 281D           Start Time: 1000  Stop Time: 1045  Total time in clinic (min): 45 minutes      Subjective: Felicia Arambula reports little pain at this time  He is still wearing his brace for comfort and pain relief  He notes adherence to HEP  Objective: See treatment diary below  Assessment: Alan tolerated treatment well with consistent cuing throughout  TE's were performed with increased resistance and increased reps  New TE's were demonstrated with proper technique, and tolerated well  Following treatment, the patient demonstrated fatigue post treatment, exhibited good technique with therapeutic exercises and would benefit from continued PT  Plan: Continue per plan of care  Progress treatment as tolerated  Precautions: N/A    Access Code: [email or web address]  URL: [email or web address]  Date: 2023  Prepared by: Ariane Conception    Exercises  • Small Range Straight Leg Raise - 1 x daily - 7 x weekly - 3 sets - 10 reps  • Clamshell with Resistance - 1 x daily - 7 x weekly - 3 sets - 10 reps  • Supine Bridge with Resistance Band - 1 x daily - 7 x weekly - 3 sets - 10 reps  • Supine Ankle Inversion Eversion AROM - 1 x daily - 7 x weekly - 3 sets - 10 reps  • Standing Heel Raise with Support - 1 x daily - 7 x weekly - 3 sets - 10 reps  • Standing Toe Raises at Chair - 1 x daily - 7 x weekly - 3 sets - 10 reps  • Ice - 1 x daily - 7 x weekly - 3 sets - 10 reps    Patient Education  • Meniscus Tear    Manuals    Knee PROM             Tibiofemoral distr   MWM                          Re-eval        TS Neuro Re-Ed             Rockerboard       2'/2'   2'/2'   SLS             SL clock       6x      StS walk on foam TB             Bosu Squat hold          2x8 5" hold   Bosu SL balance          10x10"   Ther Ex             Nustep  10 min L5 LE only  10' L6 LE only 10' L6 LE only 10' L7 no UE 10' L7 no UE 10' L7 10' upright bike 10 upright bike L 1  10' upright bike   BW walking TM       3'      Standing calf stretch    30"x3          Monster walk        NV GTB around ankles - 3 laps     Lateral walk        NV GTB around ankles - 3 laps     Standing hip 3 way             Bridge w/ alt  DF PF 3x6 2x 10 3" hold   2x10 3" hold BTB 2x10 3" hold BTB 3x10  BTB 3x10       Clamshell 3x10 GTB 5" hold 3x 10 in hooklying  GTB 5" 3x10   BTB 3x10 BTB 3x10       S/L clamshells   NV  BTB 5" 3x10 ea         SLR 3 way Flex 3x8 2x 10 Flexion  Held 2x10 flexion  3x10 flexion 3x10       HL ankle inv/ev 3x20 HEP only            Heel raise HEP HEP only            Toe raise HEP HEP only            Matrix HS  NV 3x15 35# 35# 1x10 DL  2x10 2 up 1 down 35# 3x10 2 up 1 down 55# 3x10 2 up 1 down 55# 3x10 2 up 1 down 55# 3x10 2 down 1 up 55# 3x10 2 down 1 up 55# 3X10 2d1up   Matrix Quad  NV 3x10 15# 15# 1x10 DL  2x10 2 up 1 down 25# 3x8 2 up 1 down 25# 3x8 2 up 1 down 35# 3x8 2 up 1 down 35# 3x10 2 up 1 down 35# 3x10 2 up 1 down 35# 3x10   TKE   2x20 supine 10"x10 BTB  Mariusz 16# 2x20 [place] 16# 2x20 Mariusz 24# 2x20 NV     Ther Activity             TG SL  L22 B/L  2x 10  L22 3x10 L22 2x15  L22 3x12 L22 3x12 L22 3x12      FSU  6" 2x 10   6" 2x10 6" 2x10        FSD             LSU             LSD    4" 2x10         Resisted BW Walking     11# 10x BW 13# 10x 4 ways 17# 10x 4 ways   15# 4 ways 10x   Side lunge on slider        NV 2x 10  Side and bwd     Lunge w/ lat/med res          NV [employer]     Gait Training                                       Modalities                                            J  C  Freda, PT  2/16/2023,10:52 AM

## 2023-02-20 ENCOUNTER — APPOINTMENT (OUTPATIENT)
Dept: PHYSICAL THERAPY | Facility: CLINIC | Age: 58
End: 2023-02-20

## 2023-02-21 ENCOUNTER — APPOINTMENT (OUTPATIENT)
Dept: PHYSICAL THERAPY | Facility: CLINIC | Age: 58
End: 2023-02-21

## 2023-02-23 ENCOUNTER — APPOINTMENT (OUTPATIENT)
Dept: PHYSICAL THERAPY | Facility: CLINIC | Age: 58
End: 2023-02-23

## 2023-02-23 ENCOUNTER — HOSPITAL ENCOUNTER (OUTPATIENT)
Dept: MRI IMAGING | Facility: CLINIC | Age: 58
Discharge: HOME | End: 2023-02-23

## 2023-02-23 DIAGNOSIS — M23.91 INTERNAL DERANGEMENT OF RIGHT KNEE: ICD-10-CM

## 2023-02-27 ENCOUNTER — APPOINTMENT (OUTPATIENT)
Dept: PHYSICAL THERAPY | Facility: CLINIC | Age: 58
End: 2023-02-27

## 2023-03-02 ENCOUNTER — APPOINTMENT (OUTPATIENT)
Dept: PHYSICAL THERAPY | Facility: CLINIC | Age: 58
End: 2023-03-02

## 2023-03-02 ENCOUNTER — OFFICE VISIT (OUTPATIENT)
Dept: OBGYN CLINIC | Facility: CLINIC | Age: 58
End: 2023-03-02

## 2023-03-02 VITALS
SYSTOLIC BLOOD PRESSURE: 131 MMHG | HEIGHT: 68 IN | BODY MASS INDEX: 38.65 KG/M2 | TEMPERATURE: 98.6 F | HEART RATE: 85 BPM | WEIGHT: 255 LBS | DIASTOLIC BLOOD PRESSURE: 78 MMHG

## 2023-03-02 DIAGNOSIS — S83.241D OTHER TEAR OF MEDIAL MENISCUS OF RIGHT KNEE AS CURRENT INJURY, SUBSEQUENT ENCOUNTER: ICD-10-CM

## 2023-03-02 DIAGNOSIS — S83.281A TEAR OF LATERAL MENISCUS OF RIGHT KNEE, UNSPECIFIED TEAR TYPE, UNSPECIFIED WHETHER OLD OR CURRENT TEAR, INITIAL ENCOUNTER: Primary | ICD-10-CM

## 2023-03-02 NOTE — PATIENT INSTRUCTIONS
- Discussed conservative treatment and surgical intervention with patient at length  - Weight bearing as tolerated right lower extremity   - Referral placed to Dr Naveed Moseley for discussion of potential surgical intervention   - Continue with physical therapy / home exercise program as directed   - Over the counter analgesics as needed / directed   - Ice / heat as directed   - Follow up PRN

## 2023-03-02 NOTE — LETTER
March 2, 2023     Patient: Yoan Conti  YOB: 1965  Date of Visit: 3/2/2023      To Whom it May Concern:    Mindy De La O is under my professional care  Gelene Staple was seen in my office on 3/2/2023  Gelene Staple may not return to work until otherwise stated  Patient has been actively participating in conservative treatment (physical therapy and medication)  Patient has appointment with additional orthopedic surgeon for discussion of surgical intervention in future due to orthopedic condition  If you have any questions or concerns, please don't hesitate to call           Sincerely,          Nora Fontenot MD

## 2023-03-02 NOTE — PROGRESS NOTES
Orthopaedics Office Visit - Follow up Patient Visit    ASSESSMENT/PLAN:    Assessment:   MRI reviewed today:  Right medial meniscus posterior root tear, mild OA   Improved with cortisone injection and physical therapy but still with persistent medial and posteromedial knee pain      Plan:   - Discussed conservative treatment and surgical intervention with patient at length  - Weight bearing as tolerated right lower extremity   - Referral placed to Dr Betina Romero for discussion of potential surgical intervention   - Continue with physical therapy / home exercise program as directed   - Over the counter analgesics as needed / directed   - Ice / heat as directed   - Follow up PRN       To Do Next Visit:  PRN   _____________________________________________________  CHIEF COMPLAINT:  Chief Complaint   Patient presents with   • Right Knee - Follow-up         SUBJECTIVE:  Gordo Herndon is a 62 y o  male who presents to the office for a follow-up evaluation of his right knee  Patient recently had a MRI performed of the right knee  Patient is approximately 2-month status post cortisone injection to the right knee  Patient states that his right knee is doing well overall  Patient states that his pain continues to be on the posterior medial aspect of the right knee that becomes worse with prolonged activity  Patient states that he has been participating with physical therapy with good results  Patient does take mobic as needed for pain with good results  Patient offers no other complaints at this time         PAST MEDICAL HISTORY:  Past Medical History:   Diagnosis Date   • Dyspnea on exertion     last assessed: 3/11/2016   • Hypertension    • Major depressive disorder, single episode, moderate ([facility] [address] ) 12/19/2016       PAST SURGICAL HISTORY:  Past Surgical History:   Procedure Laterality Date   • FIBULA FRACTURE SURGERY     • HERNIA REPAIR     • TONSILLECTOMY         FAMILY HISTORY:  Family History   Problem Relation Age of Onset   • Leukemia Mother         acute myeloid   • Hypertension Mother    • Multiple myeloma Mother    • Diabetes Father         mellitus   • Hypertension Father    • Pneumonia Father         NSIP (nonspecific interstital pneumonia)       SOCIAL HISTORY:  Social History     Tobacco Use   • Smoking status: Never   • Smokeless tobacco: Never   Vaping Use   • Vaping Use: Never used   Substance Use Topics   • Alcohol use: Not Currently     Comment: no alchol use (as per allscripts)   • Drug use: Yes     Types: Marijuana       MEDICATIONS:    Current Outpatient Medications:   •  albuterol (PROVENTIL HFA,VENTOLIN HFA) 90 mcg/act inhaler, Inhale 2 puffs every 6 (six) hours as needed for wheezing, Disp: 6 7 g, Rfl: 3  •  amLODIPine (NORVASC) 10 mg tablet, TAKE 1 TABLET BY MOUTH EVERY DAY, Disp: 30 tablet, Rfl: 5  •  ergocalciferol (VITAMIN D2) 50,000 units, TAKE 1 CAPSULE BY MOUTH ONE TIME PER WEEK, Disp: 4 capsule, Rfl: 1  •  losartan (COZAAR) 100 MG tablet, TAKE 1 TABLET BY MOUTH EVERY DAY, Disp: 30 tablet, Rfl: 5  •  meloxicam (Mobic) 15 mg tablet, Take 1 tablet (15 mg total) by mouth daily as needed (knee pain), Disp: 30 tablet, Rfl: 1  •  methylPREDNISolone 4 MG tablet therapy pack, Use as directed on package (Patient taking differently: if needed Use as directed on package), Disp: 21 tablet, Rfl: 0  •  tadalafil (CIALIS) 10 MG tablet, Take 1 tablet (10 mg total) by mouth daily as needed for erectile dysfunction, Disp: 10 tablet, Rfl: 2  •  allopurinol (ZYLOPRIM) 100 mg tablet, Take 2 tablets (200 mg total) by mouth daily (Patient not taking: Reported on 12/12/2022), Disp: 60 tablet, Rfl: 5    ALLERGIES:  No Known Allergies    REVIEW OF SYSTEMS:  MSK: right knee pain   Neuro: WNL   Pertinent items are otherwise noted in HPI  A comprehensive review of systems was otherwise negative      LABS:  HgA1c:   Lab Results   Component Value Date    HGBA1C 6 1 (H) 09/26/2022     BMP:   Lab Results   Component Value Date CALCIUM 9 7 09/26/2022     12/29/2017    K 4 5 09/26/2022    CO2 31 09/26/2022     09/26/2022    BUN 25 09/26/2022    CREATININE 1 39 (H) 09/26/2022     CBC: No components found for: CBC    _____________________________________________________  PHYSICAL EXAMINATION:  Vital signs: /78   Pulse 85   Temp 98 6 °F (37 °C)   Ht 5' 8" (1 727 m)   Wt 116 kg (255 lb)   BMI 38 77 kg/m²   General: No acute distress, awake and alert  Psychiatric: Mood and affect appear appropriate  HEENT: Trachea Midline, No torticollis, no apparent facial trauma  Cardiovascular: No audible murmurs; Extremities appear perfused  Pulmonary: No audible wheezing or stridor  Skin: No open lesions; see further details (if any) below      MUSCULOSKELETAL EXAMINATION:  Extremities:  Right knee   - Patient sitting comfortably in the office in no acute distress   -No acute visible abnormalities present in the right knee   Extremity appears well-perfused overall   -Tenderness to palpation over the medial joint line   No other bony or soft tissue tenderness to palpation noted at this time   -Full active range of motion and strength of the right knee without pain   - Special tests:       + McMurrays        Ligaments stable on exam   - NV intact distally     _____________________________________________________  STUDIES REVIEWED:  I personally reviewed the images and interpretation is as follows:  MRI knee right  wo contrast  IMPRESSION:  Medial meniscus posterior root tear with associated extrusion  Medial femoral condyle subchondral insufficiency fracture  Mild osteoarthritis in the patellofemoral and medial tibiofemoral compartments  PROCEDURES PERFORMED:  No procedures were performed at this time  Mc Foster PA-C - assisting  Adrian Villasenor                    Portions of the record may have been created with voice recognition software    Occasional wrong word or "sound a like" substitutions may have occurred due to the inherent limitations of voice recognition software  Read the chart carefully and recognize, using context, where substitutions have occurred

## 2023-03-06 ENCOUNTER — APPOINTMENT (OUTPATIENT)
Dept: PHYSICAL THERAPY | Facility: CLINIC | Age: 58
End: 2023-03-06

## 2023-03-08 ENCOUNTER — TELEPHONE (OUTPATIENT)
Dept: OBGYN CLINIC | Facility: HOSPITAL | Age: 58
End: 2023-03-08

## 2023-03-08 NOTE — TELEPHONE ENCOUNTER
Caller: Patient     Doctor: [facility]     Reason for call: Patient will be stopping by to drop off his paper work for short term disability       Call back#: N/A

## 2023-03-09 ENCOUNTER — EVALUATION (OUTPATIENT)
Dept: PHYSICAL THERAPY | Facility: CLINIC | Age: 58
End: 2023-03-09

## 2023-03-09 DIAGNOSIS — S83.281D TEAR OF LATERAL MENISCUS OF RIGHT KNEE, UNSPECIFIED TEAR TYPE, UNSPECIFIED WHETHER OLD OR CURRENT TEAR, SUBSEQUENT ENCOUNTER: Primary | ICD-10-CM

## 2023-03-09 NOTE — PROGRESS NOTES
PT Evaluation     Today's date: 3/9/2023  Patient name: Janki Sales  : 1965  MRN: [id number]  Referring provider: HERNANDEZ Mccauley*  Dx:   Encounter Diagnosis     ICD-10-CM    1  Tear of lateral meniscus of right knee, unspecified tear type, unspecified whether old or current tear, subsequent encounter  S83 281D           Start Time: 1015  Stop Time: 1054  Total time in clinic (min): 39 minutes    Assessment  Assessment details: Janki Sales is a a pleasant 62 y o  male who presents today for re-evaluation for his R knee  he complains of no pain at this time  The patient demonstrates within functional limits strength during resisted muscle testing, which has improved from previous evaluation  Pt ROM is within functional limits with no pain, which has improved from previous evaluation  The patient has improved function since starting therapy and has no difficulty with movement  Cristianeberlin Thompson has met all of his goals at this time  Plan of care will be discontinued, and patient discharged to an independent HEP  Impairments: abnormal coordination, abnormal gait, abnormal muscle firing, abnormal or restricted ROM, abnormal movement, activity intolerance, impaired balance, impaired physical strength, lacks appropriate home exercise program, pain with function, safety issue, weight-bearing intolerance, poor posture  and poor body mechanics  Understanding of Dx/Px/POC: excellent  Goals  ST  Independent with HEP in 2 weeks  - GOAL MET  2  Pt will have verbal report of improvement in symptoms by >/=25% in 2 weeks  - GOAL MET  3  Decrease pain to 6/10 at it's worst  - GOAL MET  4  Increase strength by 1/2 grade in all deficient planes  - GOAL MET    To be achieved by D/C   LT  Pt will improve FOTO score by >/= goal points in 6 weeks  - GOAL MET  2  Pt will improve FOTO score to >/= goal score by visit # 12  - GOAL MET  3   Pt will be able to perform his usual work activities with little to no difficulty  - GOAL MET  4  Pt will be able to walk a mile with little to no difficulty  - GOAL MET  5  Pt will be able to stand for an hour with little to no difficulty  - GOAL MET    Plan  Patient would benefit from: skilled PT  Planned modality interventions: cryotherapy, hydrotherapy, TENS and unattended electrical stimulation  Plan of Care beginning date: 3/9/2023  Plan of Care expiration date: 3/9/2023  Treatment plan discussed with: patient        Subjective Evaluation    History of Present Illness  Mechanism of injury: Darby Painting reports that his knee is doing a lot better overall  He notes occasional pain in the knee  Notes adherence to HEP and has not had any issues at home  He notes that he is comfortable being discharged today to an HEP today  Pain  Current pain ratin  At best pain ratin  At worst pain ratin  Progression: improved      Diagnostic Tests  X-ray: normal  Patient Goals  Patient goals for therapy: decreased edema, decreased pain, increased motion, improved balance, return to sport/leisure activities, independence with ADLs/IADLs, increased strength and return to work          Objective     Passive Range of Motion   Left Knee   Flexion: 130 degrees   Extension: 0 degrees     Right Knee   Flexion: 130 degrees   Extension: 0 degrees     Strength/Myotome Testing     Left Hip   Planes of Motion   Flexion: 4+  Abduction: 4+  Adduction: 4+    Right Hip   Planes of Motion   Flexion: 4  Abduction: 4  Adduction: 4+    Left Knee   Flexion: 5  Extension: 5    Right Knee   Flexion: 5  Extension: 5            Precautions: N/A    Access Code: [email or web address]  URL: [email or web address]  Date: 2023  Prepared by: Madhu Paris    Exercises  • Small Range Straight Leg Raise - 1 x daily - 7 x weekly - 3 sets - 10 reps  • Clamshell with Resistance - 1 x daily - 7 x weekly - 3 sets - 10 reps  • Supine Bridge with Resistance Band - 1 x daily - 7 x weekly - 3 sets - 10 reps  • Supine Ankle Inversion Eversion AROM - 1 x daily - 7 x weekly - 3 sets - 10 reps  • Standing Heel Raise with Support - 1 x daily - 7 x weekly - 3 sets - 10 reps  • Standing Toe Raises at Chair - 1 x daily - 7 x weekly - 3 sets - 10 reps  • Ice - 1 x daily - 7 x weekly - 3 sets - 10 reps    Patient Education  • Meniscus Tear    Manuals 3/9 1/9 1/12 1/16 1/19 1/23 1/30 2/2 2/6  2/16   Knee PROM             Tibiofemoral distr  MWM                          Re-eval TS       TS                                                         Neuro Re-Ed             Rockerboard       2'/2'   2'/2'   SLS             SL clock       6x      StS walk on foam TB             Bosu Squat hold          2x8 5" hold   Bosu SL balance          10x10"   Ther Ex             Nustep 10' Upright bike 10 min L5 LE only  10' L6 LE only 10' L6 LE only 10' L7 no UE 10' L7 no UE 10' L7 10' upright bike 10 upright bike L 1  10' upright bike   BW walking TM       3'      Standing calf stretch    30"x3          Monster walk 10x10' GTB       NV GTB around ankles - 3 laps     Lateral walk 10x10' GTB       NV GTB around ankles - 3 laps     Standing hip 3 way             Bridge w/ alt   DF PF  2x 10 3" hold   2x10 3" hold BTB 2x10 3" hold BTB 3x10  BTB 3x10       Clamshell  GTB 5" hold 3x 10 in hooklying  GTB 5" 3x10   BTB 3x10 BTB 3x10       S/L clamshells   NV  BTB 5" 3x10 ea         SLR 3 way  2x 10 Flexion  Held 2x10 flexion  3x10 flexion 3x10       HL ankle inv/ev  HEP only            Heel raise  HEP only            Toe raise  HEP only            Matrix HS  NV 3x15 35# 35# 1x10 DL  2x10 2 up 1 down 35# 3x10 2 up 1 down 55# 3x10 2 up 1 down 55# 3x10 2 up 1 down 55# 3x10 2 down 1 up 55# 3x10 2 down 1 up 55# 3X10 2d1up   Matrix Quad  NV 3x10 15# 15# 1x10 DL  2x10 2 up 1 down 25# 3x8 2 up 1 down 25# 3x8 2 up 1 down 35# 3x8 2 up 1 down 35# 3x10 2 up 1 down 35# 3x10 2 up 1 down 35# 3x10   TKE   2x20 supine 10"x10 BTB  [place] 16# 2x20 [place] 16# 2x20 [place] 24# 2x20 NV Ther Activity             TG SL  L22 B/L  2x 10  L22 3x10 L22 2x15  L22 3x12 L22 3x12 L22 3x12      FSU  6" 2x 10   6" 2x10 6" 2x10        FSD             LSU             LSD    4" 2x10         Resisted BW Walking     11# 10x BW 13# 10x 4 ways 17# 10x 4 ways   15# 4 ways 10x   Side lunge on slider        NV 2x 10  Side and bwd     Lunge w/ lat/med res          NV [employer]     Gait Training                                       Modalities

## 2023-03-16 ENCOUNTER — APPOINTMENT (OUTPATIENT)
Dept: RADIOLOGY | Facility: CLINIC | Age: 58
End: 2023-03-16

## 2023-03-16 ENCOUNTER — OFFICE VISIT (OUTPATIENT)
Dept: OBGYN CLINIC | Facility: CLINIC | Age: 58
End: 2023-03-16

## 2023-03-16 VITALS
HEIGHT: 68 IN | SYSTOLIC BLOOD PRESSURE: 204 MMHG | BODY MASS INDEX: 40.01 KG/M2 | DIASTOLIC BLOOD PRESSURE: 160 MMHG | HEART RATE: 101 BPM | WEIGHT: 264 LBS

## 2023-03-16 DIAGNOSIS — M25.561 ACUTE PAIN OF RIGHT KNEE: ICD-10-CM

## 2023-03-16 DIAGNOSIS — S83.241D OTHER TEAR OF MEDIAL MENISCUS OF RIGHT KNEE AS CURRENT INJURY, SUBSEQUENT ENCOUNTER: Primary | ICD-10-CM

## 2023-03-16 DIAGNOSIS — S83.281A TEAR OF LATERAL MENISCUS OF RIGHT KNEE, UNSPECIFIED TEAR TYPE, UNSPECIFIED WHETHER OLD OR CURRENT TEAR, INITIAL ENCOUNTER: ICD-10-CM

## 2023-03-16 NOTE — PROGRESS NOTES
Patient Name:  Jason Cao  MRN:  [id number]    [address] [place], [place] [address]     1  Other tear of medial meniscus of right knee as current injury, subsequent encounter  -     Ambulatory Referral to Orthopedic Surgery    2  Acute pain of right knee  -     XR knee 1 or 2 vw right; Future; Expected date: 03/16/2023  -     XR knee 1 or 2 vw left; Future; Expected date: 03/16/2023        62 y o  male with Right knee medial meniscus tear and subchondral insufficiency fracture  X-rays and MRI reviewed in office today with patient  We discussed in detail the anatomy and function of the native meniscus and the disruption of mechanical function after meniscal root tear  Discussed subchondral insufficiency fractures, as well  Nonoperative treatment was discussed in the form of activity modification, oral analgesics, injection therapy, formal physical therapy, home exercise program, and bracing  Surgical management would include knee arthroscopy with medial meniscus root repair vs partial medial menisectomy  Briefly discussed intraoperative findings including meniscus tear, location of tear and evaluating the tissue/tear if it could be amendable to fixation, post operative immobilization pending procedure, outpatient PT, weightbearing status, return to work/full function  Advised patient the indication for this surgery would be to decrease pain and decrease rapid progression of osteoarthritis  At this time, patient reports his pain has improved within the past few months and recommended nonoperative treatment  Risks of continued non operative management include persistent pain, decreased function, and rapid progression of osteoarthritis  Advised patient we can consider repeat injection or visco injections in the future pending symptoms   Advised patient to monitor his symptoms of pain, exacerbation with certain activities, swelling, and locking and call the office if pain worsens and will discuss continued nonoperative vs surgical intervention at that time  Patient agreeable and will follow up in office in 8-10 weeks for reevaluation  Chief Complaint     Right knee pain    History of the Present Illness     Chicho Ramirez is a 62 y o  male with Right knee pain ongoing since beginning of December without known injury  He started to have difficulty with weightbearing and range of motion with associated swelling  Patient did seek out medical care with Dr Amaris Tidwell whom provided corticosteroid injection on 12/29/2022 which provided some pain relief  Patient has also been attending outpatient PT which has provided moderate pain relief  Today, patient states he does not have daily knee pain  When he performs increased function, he notes medial knee pain  He does admit to knee swelling, but denies locking  He is currently working at [employer], but will be starting a new job in April where he will be working from home with less standing, lifting, and labor intensive work  He would also like to see his new benefits before moving forward with surgery if indicated  Review of Systems     Review of Systems   Constitutional: Negative for chills and fever  HENT: Negative for ear pain and sore throat  Eyes: Negative for pain and visual disturbance  Respiratory: Negative for cough and shortness of breath  Cardiovascular: Negative for chest pain and palpitations  Gastrointestinal: Negative for abdominal pain and vomiting  Genitourinary: Negative for dysuria and hematuria  Musculoskeletal: Negative for arthralgias and back pain  Skin: Negative for color change and rash  Neurological: Negative for seizures and syncope  All other systems reviewed and are negative  Physical Exam     BP (!) 204/160 Comment: pt did not take meds this am  Pulse 101   Ht 5' 8" (1 727 m)   Wt 120 kg (264 lb)   BMI 40 14 kg/m²     Right Knee  Range of motion from 0 to 130 degrees and pain with terminal extension      There is no crepitus with range of motion  There is trace effusion  There is tenderness over the medial and posteromedial joint line   There is 5/5 quadriceps strength and preserved tone  The patient is able to perform a straight leg raise  negative patellar grind test   Anterior drawer tests is negative  negative Lachman Test    Posterior drawer test is   negative   Varus stress testing reveals no pain or laxity at 0 and 30 degrees   Valgus stress testing reveals no pain or laxity at 0 and 30 degrees  Dana's testing is negative   The patient is neurovascular intact distally  Eyes:  Anicteric sclerae  Neck:  Supple  Lungs:  Normal respiratory effort  Cardiovascular:  Capillary refill is less than 2 seconds  Skin:  Intact without erythema  Neurologic:  Sensation grossly intact to light touch  Psychiatric:  Mood and affect are appropriate  Data Review     I have personally reviewed pertinent films in PACS, and my interpretation follows:    X-rays taken 12/06/2022, 03/16/2023 of Right knee independently reviewed and demonstrate mild medial joint space narrowing without evidence of fracture, dislocation  MRI performed 02/23/2023 of Right knee demonstrates radial posterior medial meniscus tear, possible posterior root tear with associated subchondral insufficiency fracture and bony edema       Past Medical History:   Diagnosis Date   • Dyspnea on exertion     last assessed: 3/11/2016   • Hypertension    • Major depressive disorder, single episode, moderate ([facility] [address] ) 12/19/2016       Past Surgical History:   Procedure Laterality Date   • FIBULA FRACTURE SURGERY     • HERNIA REPAIR     • TONSILLECTOMY         No Known Allergies    Current Outpatient Medications on File Prior to Visit   Medication Sig Dispense Refill   • albuterol (PROVENTIL HFA,VENTOLIN HFA) 90 mcg/act inhaler Inhale 2 puffs every 6 (six) hours as needed for wheezing 6 7 g 3   • amLODIPine (NORVASC) 10 mg tablet TAKE 1 TABLET BY MOUTH EVERY DAY 30 tablet 5   • ergocalciferol (VITAMIN D2) 50,000 units TAKE 1 CAPSULE BY MOUTH ONE TIME PER WEEK 4 capsule 1   • losartan (COZAAR) 100 MG tablet TAKE 1 TABLET BY MOUTH EVERY DAY 30 tablet 5   • meloxicam (Mobic) 15 mg tablet Take 1 tablet (15 mg total) by mouth daily as needed (knee pain) 30 tablet 1   • tadalafil (CIALIS) 10 MG tablet Take 1 tablet (10 mg total) by mouth daily as needed for erectile dysfunction 10 tablet 2   • allopurinol (ZYLOPRIM) 100 mg tablet Take 2 tablets (200 mg total) by mouth daily (Patient not taking: Reported on 12/12/2022) 60 tablet 5   • methylPREDNISolone 4 MG tablet therapy pack Use as directed on package (Patient not taking: Reported on 3/16/2023) 21 tablet 0     No current facility-administered medications on file prior to visit         Social History     Tobacco Use   • Smoking status: Never   • Smokeless tobacco: Never   Vaping Use   • Vaping Use: Never used   Substance Use Topics   • Alcohol use: Not Currently     Comment: no alchol use (as per allscripts)   • Drug use: Yes     Types: Marijuana       Family History   Problem Relation Age of Onset   • Leukemia Mother         acute myeloid   • Hypertension Mother    • Multiple myeloma Mother    • Diabetes Father         mellitus   • Hypertension Father    • Pneumonia Father         NSIP (nonspecific interstital pneumonia)             Procedures Performed     Procedures  Yusra Singh DO

## 2023-03-21 ENCOUNTER — TELEPHONE (OUTPATIENT)
Dept: OBGYN CLINIC | Facility: HOSPITAL | Age: 58
End: 2023-03-21

## 2023-03-21 NOTE — TELEPHONE ENCOUNTER
Caller: Thanh @ [employer] [facility] HR    Doctor: Carloz Arechiga    Reason for call: Needs more clinical information regarding patient  Will be faxing over form      Call back#: [phone] ext [phone]

## 2023-03-22 ENCOUNTER — TELEPHONE (OUTPATIENT)
Dept: OBGYN CLINIC | Facility: MEDICAL CENTER | Age: 58
End: 2023-03-22

## 2023-03-22 NOTE — TELEPHONE ENCOUNTER
Caller: Jaquelin Dhaliwal from clinical dept at [facility]    Doctor: Suzanne Damian / Natalia Presley    Reason for call:     Informed Jaquelin Dhaliwal the form was received  Jaquelin Dhaliwal is asking to please complete the form and fax back to # [phone]      Call back#: n/a

## 2023-03-23 NOTE — TELEPHONE ENCOUNTER
Isela He forms received via fax 03/21 addressed to Dr Abe Verdugo  This was given to Dr Abe Verdugo for review

## 2023-03-31 DIAGNOSIS — I10 BENIGN HYPERTENSION: ICD-10-CM

## 2023-03-31 DIAGNOSIS — I10 ESSENTIAL HYPERTENSION: ICD-10-CM

## 2023-03-31 RX ORDER — AMLODIPINE BESYLATE 10 MG/1
10 TABLET ORAL DAILY
Qty: 30 TABLET | Refills: 0 | Status: SHIPPED | OUTPATIENT
Start: 2023-03-31

## 2023-04-24 DIAGNOSIS — I10 BENIGN HYPERTENSION: ICD-10-CM

## 2023-04-24 DIAGNOSIS — I10 ESSENTIAL HYPERTENSION: ICD-10-CM

## 2023-04-24 RX ORDER — AMLODIPINE BESYLATE 10 MG/1
TABLET ORAL
Qty: 90 TABLET | Refills: 1 | Status: SHIPPED | OUTPATIENT
Start: 2023-04-24

## 2023-04-27 DIAGNOSIS — I10 ESSENTIAL HYPERTENSION: ICD-10-CM

## 2023-04-27 DIAGNOSIS — I10 BENIGN HYPERTENSION: ICD-10-CM

## 2023-04-27 RX ORDER — LOSARTAN POTASSIUM 100 MG/1
100 TABLET ORAL DAILY
Qty: 90 TABLET | Refills: 1 | Status: SHIPPED | OUTPATIENT
Start: 2023-04-27

## 2023-05-25 ENCOUNTER — OFFICE VISIT (OUTPATIENT)
Dept: OBGYN CLINIC | Facility: CLINIC | Age: 58
End: 2023-05-25

## 2023-05-25 VITALS
BODY MASS INDEX: 38.55 KG/M2 | SYSTOLIC BLOOD PRESSURE: 156 MMHG | DIASTOLIC BLOOD PRESSURE: 101 MMHG | HEART RATE: 81 BPM | HEIGHT: 68 IN | WEIGHT: 254.4 LBS

## 2023-05-25 DIAGNOSIS — S83.241D OTHER TEAR OF MEDIAL MENISCUS OF RIGHT KNEE AS CURRENT INJURY, SUBSEQUENT ENCOUNTER: Primary | ICD-10-CM

## 2023-05-25 NOTE — PROGRESS NOTES
"Patient Name:  Yanira Bashir  MRN:  [id number]    [address] [place], [place] [address]     1  Other tear of medial meniscus of right knee as current injury, subsequent encounter        Right knee medial meniscus tear and subchondral insufficiency fracture  · Discussed treatment options moving forward including right knee arthroscopy  Also discussed meniscal root tear can put him at risk for more rapidly progressing arthritis arthritis  · The patient is understanding of the treatment plan moving forward  · Discussed a referral to physical therapy for low back, core and hip strengthening and stretching  The patient declined at this time  · Follow-up as needed     History of the Present Illness   Yanira Bashir is a 62 y o  male with Right knee medial meniscus tear and subchondral insufficiency fracture  Pain is rated a 5/10 however he notes he is doing rather well  He started a new job and is only required to work 2 days per week  He does a lot of walking for his new job  He notes when he doesn't wear his brace he has pain however when he wears his brace he has no pain  He denies any locking, catching or instability  He also notes pain in the lower back  He denies any numbness or tingling into his lower extremities  Review of Systems     Review of Systems   Constitutional: Negative for appetite change and unexpected weight change  HENT: Negative for congestion and trouble swallowing  Eyes: Negative for visual disturbance  Respiratory: Negative for cough and shortness of breath  Cardiovascular: Negative for chest pain and palpitations  Gastrointestinal: Negative for nausea and vomiting  Endocrine: Negative for cold intolerance and heat intolerance  Musculoskeletal: Positive for arthralgias  Negative for joint swelling and myalgias  Skin: Negative for rash  Neurological: Negative for numbness         Physical Exam     BP (!) 156/101   Pulse 81   Ht 5' 8\" (1 727 m)   Wt 115 kg (254 lb 6 4 oz)   BMI " 38 68 kg/m²     Right Knee  Range of motion from 0 to 120  There is no effusion  There is no tenderness  The patient is able to perform a straight leg raise with 5/5 quad strength  Varus stress testing reveals no instability at 0 and 30 degrees   Valgus stress testing reveals no instability at 0 and 30 degrees  Dana's testing is negative  The patient is neurovascular intact distally  Data Review     I have personally reviewed pertinent films in PACS, and my interpretation follows  No new images at today's appointment  X-rays taken 12/06/2022, 03/16/2023 of Right knee independently reviewed and demonstrate mild medial joint space narrowing without evidence of fracture, dislocation      MRI performed 02/23/2023 of Right knee demonstrates radial posterior medial meniscus tear, possible posterior root tear with associated subchondral insufficiency fracture and bony edema  Social History     Tobacco Use   • Smoking status: Never   • Smokeless tobacco: Never   Vaping Use   • Vaping Use: Never used   Substance Use Topics   • Alcohol use: Not Currently     Comment: no alchol use (as per allscripts)   • Drug use: Yes     Types: Marijuana           Procedures  None performed       Akil Counter   Scribe Attestation    I,:  Akil Counter am acting as a scribe while in the presence of the attending physician :       I,:  Yesenia Vega, DO personally performed the services described in this documentation    as scribed in my presence :

## 2023-05-27 ENCOUNTER — OFFICE VISIT (OUTPATIENT)
Dept: URGENT CARE | Facility: MEDICAL CENTER | Age: 58
End: 2023-05-27

## 2023-05-27 VITALS
OXYGEN SATURATION: 97 % | DIASTOLIC BLOOD PRESSURE: 94 MMHG | BODY MASS INDEX: 39.4 KG/M2 | SYSTOLIC BLOOD PRESSURE: 148 MMHG | WEIGHT: 260 LBS | TEMPERATURE: 98.3 F | RESPIRATION RATE: 18 BRPM | HEIGHT: 68 IN | HEART RATE: 70 BPM

## 2023-05-27 DIAGNOSIS — M54.32 SCIATICA OF LEFT SIDE: Primary | ICD-10-CM

## 2023-05-27 RX ORDER — PREDNISONE 20 MG/1
20 TABLET ORAL 2 TIMES DAILY WITH MEALS
Qty: 10 TABLET | Refills: 0 | Status: SHIPPED | OUTPATIENT
Start: 2023-05-27 | End: 2023-06-01

## 2023-05-27 RX ORDER — CYCLOBENZAPRINE HCL 10 MG
10 TABLET ORAL 3 TIMES DAILY
Qty: 15 TABLET | Refills: 0 | Status: SHIPPED | OUTPATIENT
Start: 2023-05-27 | End: 2023-06-01

## 2023-05-27 NOTE — PROGRESS NOTES
[facility]        NAME: Sia Multani is a 62 y o  male  : 1965    MRN: [id number]  DATE: May 27, 2023  TIME: 9:57 AM    Assessment and Plan   Sciatica of left side [M54 32]  1  Sciatica of left side  predniSONE 20 mg tablet    cyclobenzaprine (FLEXERIL) 10 mg tablet            Patient Instructions     1  Take Prednisone 20mg  Twice daily x 5 days  2  Take Flexeril 10mg  Up to 3x daily  3  Tylenol as needed for pain  4  Recommend consult with Orthopedics if symptoms persist          Chief Complaint     Chief Complaint   Patient presents with   • Back Pain     Left back pain down left leg started wed; denies urinary sxs         History of Present Illness       [facility] is a 66-year-old male who presents with a 3-day history of acute onset left-sided back pain  Patient reports his symptoms started with mild discomfort that gradually increased over the past 3 days  He does report radiation of the pain into his left posterior thigh and down the back of his leg  He denies any numbness, tingling or weakness  Patient has no prior history of back injury, he reports no changes to his bowel or bladder functions since the onset of his back pain  Review of Systems   Review of Systems   Constitutional: Negative  HENT: Negative  Musculoskeletal: Positive for back pain  Negative for gait problem  Neurological: Negative for weakness and numbness           Current Medications       Current Outpatient Medications:   •  albuterol (PROVENTIL HFA,VENTOLIN HFA) 90 mcg/act inhaler, Inhale 2 puffs every 6 (six) hours as needed for wheezing, Disp: 6 7 g, Rfl: 3  •  amLODIPine (NORVASC) 10 mg tablet, TAKE 1 TABLET BY MOUTH EVERY DAY, Disp: 90 tablet, Rfl: 1  •  cyclobenzaprine (FLEXERIL) 10 mg tablet, Take 1 tablet (10 mg total) by mouth 3 (three) times a day for 5 days, Disp: 15 tablet, Rfl: 0  •  ergocalciferol (VITAMIN D2) 50,000 units, Take 1 capsule (50,000 Units total) by mouth once a week, Disp: 4 "capsule, Rfl: 3  •  losartan (COZAAR) 100 MG tablet, Take 1 tablet (100 mg total) by mouth daily, Disp: 90 tablet, Rfl: 1  •  meloxicam (Mobic) 15 mg tablet, Take 1 tablet (15 mg total) by mouth daily as needed (knee pain), Disp: 30 tablet, Rfl: 1  •  predniSONE 20 mg tablet, Take 1 tablet (20 mg total) by mouth 2 (two) times a day with meals for 5 days, Disp: 10 tablet, Rfl: 0  •  methylPREDNISolone 4 MG tablet therapy pack, Use as directed on package (Patient not taking: Reported on 3/16/2023), Disp: 21 tablet, Rfl: 0  •  tadalafil (CIALIS) 10 MG tablet, Take 1 tablet (10 mg total) by mouth daily as needed for erectile dysfunction, Disp: 10 tablet, Rfl: 2    Current Allergies     Allergies as of 05/27/2023   • (No Known Allergies)            The following portions of the patient's history were reviewed and updated as appropriate: allergies, current medications, past family history, past medical history, past social history, past surgical history and problem list      Past Medical History:   Diagnosis Date   • Dyspnea on exertion     last assessed: 3/11/2016   • Hypertension    • Major depressive disorder, single episode, moderate ([facility] [address] ) 12/19/2016       Past Surgical History:   Procedure Laterality Date   • FIBULA FRACTURE SURGERY     • HERNIA REPAIR     • TONSILLECTOMY         Family History   Problem Relation Age of Onset   • Leukemia Mother         acute myeloid   • Hypertension Mother    • Multiple myeloma Mother    • Diabetes Father         mellitus   • Hypertension Father    • Pneumonia Father         NSIP (nonspecific interstital pneumonia)         Medications have been verified  Objective   /94   Pulse 70   Temp 98 3 °F (36 8 °C) (Temporal)   Resp 18   Ht 5' 8\" (1 727 m)   Wt 118 kg (260 lb)   SpO2 97%   BMI 39 53 kg/m²   No LMP for male patient  Physical Exam     Physical Exam  Constitutional:       General: He is not in acute distress  Appearance: Normal appearance   He is not " ill-appearing  Cardiovascular:      Rate and Rhythm: Normal rate and regular rhythm  Heart sounds: Normal heart sounds  No murmur heard  Pulmonary:      Effort: Pulmonary effort is normal       Breath sounds: Normal breath sounds  Musculoskeletal:      Lumbar back: Spasms and tenderness present  Decreased range of motion  Positive left straight leg raise test    Neurological:      Mental Status: He is alert  Sensory: Sensation is intact  Motor: Motor function is intact  Deep Tendon Reflexes:      Reflex Scores:       Patellar reflexes are 2+ on the right side and 2+ on the left side  Achilles reflexes are 2+ on the right side and 2+ on the left side

## 2023-05-27 NOTE — PATIENT INSTRUCTIONS
1  Take Prednisone 20mg  Twice daily x 5 days  2  Take Flexeril 10mg  Up to 3x daily  3  Tylenol as needed for pain  4   Recommend consult with Orthopedics if symptoms persist

## 2023-06-05 DIAGNOSIS — E55.9 VITAMIN D DEFICIENCY: ICD-10-CM

## 2023-06-05 RX ORDER — ERGOCALCIFEROL 1.25 MG/1
CAPSULE ORAL
Qty: 12 CAPSULE | Refills: 2 | Status: SHIPPED | OUTPATIENT
Start: 2023-06-05

## 2023-06-09 ENCOUNTER — APPOINTMENT (OUTPATIENT)
Dept: LAB | Facility: CLINIC | Age: 58
End: 2023-06-09
Payer: COMMERCIAL

## 2023-06-09 DIAGNOSIS — R73.03 PREDIABETES: ICD-10-CM

## 2023-06-09 DIAGNOSIS — I10 BENIGN HYPERTENSION: ICD-10-CM

## 2023-06-09 DIAGNOSIS — E78.2 MIXED HYPERLIPIDEMIA: ICD-10-CM

## 2023-06-09 DIAGNOSIS — M10.9 GOUTY ARTHRITIS OF RIGHT GREAT TOE: ICD-10-CM

## 2023-06-09 LAB
ALBUMIN SERPL BCP-MCNC: 3.3 G/DL (ref 3.5–5)
ALP SERPL-CCNC: 83 U/L (ref 46–116)
ALT SERPL W P-5'-P-CCNC: 32 U/L (ref 12–78)
ANION GAP SERPL CALCULATED.3IONS-SCNC: 4 MMOL/L (ref 4–13)
AST SERPL W P-5'-P-CCNC: 17 U/L (ref 5–45)
BASOPHILS # BLD AUTO: 0.04 THOUSANDS/ÂΜL (ref 0–0.1)
BASOPHILS NFR BLD AUTO: 1 % (ref 0–1)
BILIRUB SERPL-MCNC: 0.53 MG/DL (ref 0.2–1)
BUN SERPL-MCNC: 15 MG/DL (ref 5–25)
CALCIUM ALBUM COR SERPL-MCNC: 9.8 MG/DL (ref 8.3–10.1)
CALCIUM SERPL-MCNC: 9.2 MG/DL (ref 8.3–10.1)
CHLORIDE SERPL-SCNC: 104 MMOL/L (ref 96–108)
CHOLEST SERPL-MCNC: 226 MG/DL
CO2 SERPL-SCNC: 29 MMOL/L (ref 21–32)
CREAT SERPL-MCNC: 1.19 MG/DL (ref 0.6–1.3)
EOSINOPHIL # BLD AUTO: 0.38 THOUSAND/ÂΜL (ref 0–0.61)
EOSINOPHIL NFR BLD AUTO: 7 % (ref 0–6)
ERYTHROCYTE [DISTWIDTH] IN BLOOD BY AUTOMATED COUNT: 12.8 % (ref 11.6–15.1)
EST. AVERAGE GLUCOSE BLD GHB EST-MCNC: 134 MG/DL
GFR SERPL CREATININE-BSD FRML MDRD: 66 ML/MIN/1.73SQ M
GLUCOSE P FAST SERPL-MCNC: 138 MG/DL (ref 65–99)
HBA1C MFR BLD: 6.3 %
HCT VFR BLD AUTO: 44.9 % (ref 36.5–49.3)
HDLC SERPL-MCNC: 57 MG/DL
HGB BLD-MCNC: 14.5 G/DL (ref 12–17)
IMM GRANULOCYTES # BLD AUTO: 0.06 THOUSAND/UL (ref 0–0.2)
IMM GRANULOCYTES NFR BLD AUTO: 1 % (ref 0–2)
LDLC SERPL CALC-MCNC: 145 MG/DL (ref 0–100)
LYMPHOCYTES # BLD AUTO: 1.08 THOUSANDS/ÂΜL (ref 0.6–4.47)
LYMPHOCYTES NFR BLD AUTO: 20 % (ref 14–44)
MCH RBC QN AUTO: 30.2 PG (ref 26.8–34.3)
MCHC RBC AUTO-ENTMCNC: 32.3 G/DL (ref 31.4–37.4)
MCV RBC AUTO: 94 FL (ref 82–98)
MONOCYTES # BLD AUTO: 0.52 THOUSAND/ÂΜL (ref 0.17–1.22)
MONOCYTES NFR BLD AUTO: 10 % (ref 4–12)
NEUTROPHILS # BLD AUTO: 3.33 THOUSANDS/ÂΜL (ref 1.85–7.62)
NEUTS SEG NFR BLD AUTO: 61 % (ref 43–75)
NONHDLC SERPL-MCNC: 169 MG/DL
NRBC BLD AUTO-RTO: 0 /100 WBCS
PLATELET # BLD AUTO: 340 THOUSANDS/UL (ref 149–390)
PMV BLD AUTO: 10.1 FL (ref 8.9–12.7)
POTASSIUM SERPL-SCNC: 4.1 MMOL/L (ref 3.5–5.3)
PROT SERPL-MCNC: 7.3 G/DL (ref 6.4–8.4)
RBC # BLD AUTO: 4.8 MILLION/UL (ref 3.88–5.62)
SODIUM SERPL-SCNC: 137 MMOL/L (ref 135–147)
TRIGL SERPL-MCNC: 120 MG/DL
URATE SERPL-MCNC: 7.3 MG/DL (ref 3.5–8.5)
WBC # BLD AUTO: 5.41 THOUSAND/UL (ref 4.31–10.16)

## 2023-06-09 PROCEDURE — 85025 COMPLETE CBC W/AUTO DIFF WBC: CPT

## 2023-06-09 PROCEDURE — 36415 COLL VENOUS BLD VENIPUNCTURE: CPT

## 2023-06-09 PROCEDURE — 80061 LIPID PANEL: CPT

## 2023-06-09 PROCEDURE — 84550 ASSAY OF BLOOD/URIC ACID: CPT

## 2023-06-09 PROCEDURE — 80053 COMPREHEN METABOLIC PANEL: CPT

## 2023-06-09 PROCEDURE — 83036 HEMOGLOBIN GLYCOSYLATED A1C: CPT

## 2023-06-13 ENCOUNTER — OFFICE VISIT (OUTPATIENT)
Dept: URGENT CARE | Facility: CLINIC | Age: 58
End: 2023-06-13
Payer: COMMERCIAL

## 2023-06-13 VITALS
SYSTOLIC BLOOD PRESSURE: 170 MMHG | HEART RATE: 88 BPM | RESPIRATION RATE: 20 BRPM | BODY MASS INDEX: 39.59 KG/M2 | DIASTOLIC BLOOD PRESSURE: 110 MMHG | WEIGHT: 260.38 LBS | TEMPERATURE: 97.7 F | OXYGEN SATURATION: 95 %

## 2023-06-13 DIAGNOSIS — M54.16 LUMBAR RADICULOPATHY: Primary | ICD-10-CM

## 2023-06-13 PROBLEM — M94.0 COSTOCHONDRITIS: Status: ACTIVE | Noted: 2017-04-27

## 2023-06-13 PROCEDURE — 99213 OFFICE O/P EST LOW 20 MIN: CPT

## 2023-06-13 RX ORDER — CYCLOBENZAPRINE HCL 10 MG
10 TABLET ORAL 3 TIMES DAILY PRN
Qty: 30 TABLET | Refills: 0 | Status: SHIPPED | OUTPATIENT
Start: 2023-06-13 | End: 2023-06-17

## 2023-06-13 NOTE — PROGRESS NOTES
[facility]    NAME: Annalise Helms is a 62 y o  male  : 1965    MRN: [id number]  DATE: 2023  TIME: 2:19 PM    Assessment and Plan   Lumbar radiculopathy [M54 16]  1  Lumbar radiculopathy  cyclobenzaprine (FLEXERIL) 10 mg tablet    Ambulatory Referral to Physical Therapy    Ambulatory Referral to Orthopedic Surgery        Given returning back start back on flexeril for symptoms  Less than 1 month since previous steroids so will hold on this at this time  Start physical therapy for symptoms  Follow up with orthopedics for symptoms  Go to ER if symptoms get worse  Patient Instructions     Start on flexeril   Referral to physical therapy for symptoms  Referral to orthopedics   Follow up with PCP in 3-5 days  Proceed to ER if symptoms worsen  Chief Complaint     Chief Complaint   Patient presents with   • Back Pain     Pt states that he was seen by provider end of may and treated for back pain  Pain was alleviated but has gotten worse today  More painful when laying down  History of Present Illness       Presents with back for over 1 year that has been coming/going  He was seen [facility] for symptoms and given steroids and flexeril which helped symptoms but did not totally relieve  He went back to work (he was out on disability for plantar fascitis and then right knee meniscal tear)  Then, from walking today pain started again to the left side of the back  The pain is extending down the left leg  Back Pain  This is a recurrent problem  The current episode started more than 1 year ago  The problem occurs constantly  The problem has been waxing and waning since onset  The pain is present in the gluteal, lumbar spine and sacro-iliac  The quality of the pain is described as shooting  The pain radiates to the left thigh  The pain is at a severity of 8/10  The pain is worse during the night  The symptoms are aggravated by lying down   Stiffness is present in the morning and at night  Associated symptoms include leg pain and weakness  Pertinent negatives include no abdominal pain, bladder incontinence, bowel incontinence, chest pain, dysuria, fever, headaches, numbness, paresis, paresthesias, pelvic pain, perianal numbness, tingling or weight loss  Risk factors include lack of exercise, obesity and recent trauma  Review of Systems   Review of Systems   Constitutional: Negative for fever and weight loss  Respiratory: Negative for shortness of breath  Cardiovascular: Negative for chest pain  Gastrointestinal: Negative for abdominal pain and bowel incontinence  Genitourinary: Negative for bladder incontinence, dysuria and pelvic pain  Musculoskeletal: Positive for back pain  Skin: Negative for wound  Neurological: Positive for weakness  Negative for tingling, numbness, headaches and paresthesias           Current Medications       Current Outpatient Medications:   •  albuterol (PROVENTIL HFA,VENTOLIN HFA) 90 mcg/act inhaler, Inhale 2 puffs every 6 (six) hours as needed for wheezing, Disp: 6 7 g, Rfl: 3  •  amLODIPine (NORVASC) 10 mg tablet, TAKE 1 TABLET BY MOUTH EVERY DAY, Disp: 90 tablet, Rfl: 1  •  cyclobenzaprine (FLEXERIL) 10 mg tablet, Take 1 tablet (10 mg total) by mouth 3 (three) times a day as needed for muscle spasms, Disp: 30 tablet, Rfl: 0  •  ergocalciferol (VITAMIN D2) 50,000 units, TAKE 1 CAPSULE BY MOUTH ONE TIME PER WEEK, Disp: 12 capsule, Rfl: 2  •  losartan (COZAAR) 100 MG tablet, Take 1 tablet (100 mg total) by mouth daily, Disp: 90 tablet, Rfl: 1  •  tadalafil (CIALIS) 10 MG tablet, Take 1 tablet (10 mg total) by mouth daily as needed for erectile dysfunction, Disp: 10 tablet, Rfl: 2  •  cyclobenzaprine (FLEXERIL) 10 mg tablet, Take 1 tablet (10 mg total) by mouth 3 (three) times a day for 5 days, Disp: 15 tablet, Rfl: 0  •  meloxicam (Mobic) 15 mg tablet, Take 1 tablet (15 mg total) by mouth daily as needed (knee pain) (Patient not taking: Reported on 6/13/2023), Disp: 30 tablet, Rfl: 1  •  methylPREDNISolone 4 MG tablet therapy pack, Use as directed on package (Patient not taking: Reported on 3/16/2023), Disp: 21 tablet, Rfl: 0    Current Allergies     Allergies as of 06/13/2023   • (No Known Allergies)            The following portions of the patient's history were reviewed and updated as appropriate: allergies, current medications, past family history, past medical history, past social history, past surgical history and problem list      Past Medical History:   Diagnosis Date   • Dyspnea on exertion     last assessed: 3/11/2016   • Hypertension    • Major depressive disorder, single episode, moderate ([facility] [address] ) 12/19/2016       Past Surgical History:   Procedure Laterality Date   • FIBULA FRACTURE SURGERY     • HERNIA REPAIR     • TONSILLECTOMY         Family History   Problem Relation Age of Onset   • Leukemia Mother         acute myeloid   • Hypertension Mother    • Multiple myeloma Mother    • Diabetes Father         mellitus   • Hypertension Father    • Pneumonia Father         NSIP (nonspecific interstital pneumonia)         Medications have been verified  Objective   BP (!) 170/110   Pulse 88   Temp 97 7 °F (36 5 °C)   Resp 20   Wt 118 kg (260 lb 6 oz)   SpO2 95%   BMI 39 59 kg/m²        Physical Exam     Physical Exam  Vitals reviewed  Constitutional:       Appearance: Normal appearance  Cardiovascular:      Rate and Rhythm: Normal rate and regular rhythm  Pulses: Normal pulses  Heart sounds: Normal heart sounds  No murmur heard  Pulmonary:      Effort: Pulmonary effort is normal  No respiratory distress  Breath sounds: Normal breath sounds  Musculoskeletal:      Thoracic back: Normal       Lumbar back: Tenderness present  No bony tenderness  Decreased range of motion  Positive left straight leg raise test  Negative right straight leg raise test    Skin:     General: Skin is warm and dry        Capillary Refill: Capillary refill takes less than 2 seconds  Neurological:      General: No focal deficit present  Mental Status: He is alert and oriented to person, place, and time     Psychiatric:         Mood and Affect: Mood normal          Behavior: Behavior normal

## 2023-06-13 NOTE — LETTER
June 13, 2023     Patient: Annalise Helms   YOB: 1965   Date of Visit: 6/13/2023       To Whom it May Concern:    Katy Daly was seen in my clinic on 6/13/2023  He should be excused 6/13 and 6/14/23  If you have any questions or concerns, please don't hesitate to call  Sincerely,          JOHNNIE Blunt        CC: Carlota Ga

## 2023-06-17 ENCOUNTER — APPOINTMENT (OUTPATIENT)
Dept: RADIOLOGY | Facility: CLINIC | Age: 58
End: 2023-06-17
Payer: COMMERCIAL

## 2023-06-17 ENCOUNTER — OFFICE VISIT (OUTPATIENT)
Dept: OBGYN CLINIC | Facility: CLINIC | Age: 58
End: 2023-06-17
Payer: COMMERCIAL

## 2023-06-17 VITALS
HEART RATE: 87 BPM | BODY MASS INDEX: 39.25 KG/M2 | WEIGHT: 259 LBS | SYSTOLIC BLOOD PRESSURE: 165 MMHG | OXYGEN SATURATION: 98 % | DIASTOLIC BLOOD PRESSURE: 90 MMHG | HEIGHT: 68 IN

## 2023-06-17 DIAGNOSIS — M54.50 ACUTE LOW BACK PAIN, UNSPECIFIED BACK PAIN LATERALITY, UNSPECIFIED WHETHER SCIATICA PRESENT: ICD-10-CM

## 2023-06-17 DIAGNOSIS — M51.36 DEGENERATIVE DISC DISEASE, LUMBAR: Primary | ICD-10-CM

## 2023-06-17 DIAGNOSIS — M53.3 SACROILIAC JOINT DYSFUNCTION OF LEFT SIDE: ICD-10-CM

## 2023-06-17 PROCEDURE — 99214 OFFICE O/P EST MOD 30 MIN: CPT | Performed by: FAMILY MEDICINE

## 2023-06-17 PROCEDURE — 72110 X-RAY EXAM L-2 SPINE 4/>VWS: CPT

## 2023-06-17 RX ORDER — NAPROXEN 500 MG/1
500 TABLET ORAL 2 TIMES DAILY WITH MEALS
Qty: 60 TABLET | Refills: 0 | Status: SHIPPED | OUTPATIENT
Start: 2023-06-17

## 2023-06-17 RX ORDER — CYCLOBENZAPRINE HCL 5 MG
5 TABLET ORAL 3 TIMES DAILY PRN
Qty: 90 TABLET | Refills: 0 | Status: SHIPPED | OUTPATIENT
Start: 2023-06-17

## 2023-06-17 NOTE — PROGRESS NOTES
Assessment/Plan:    No problem-specific Assessment & Plan notes found for this encounter  Diagnoses and all orders for this visit:    Degenerative disc disease, lumbar  -     XR spine lumbar minimum 4 views non injury; Future  -     Ambulatory Referral to Physical Therapy; Future  -     cyclobenzaprine (FLEXERIL) 5 mg tablet; Take 1 tablet (5 mg total) by mouth 3 (three) times a day as needed for muscle spasms  -     naproxen (Naprosyn) 500 mg tablet; Take 1 tablet (500 mg total) by mouth 2 (two) times a day with meals    Sacroiliac joint dysfunction of left side  -     Ambulatory Referral to Physical Therapy; Future  -     cyclobenzaprine (FLEXERIL) 5 mg tablet; Take 1 tablet (5 mg total) by mouth 3 (three) times a day as needed for muscle spasms  -     naproxen (Naprosyn) 500 mg tablet; Take 1 tablet (500 mg total) by mouth 2 (two) times a day with meals     Clinical impression is that patient's lower back pain is multifactorial nature arising from underlying degenerative disc disease of the lumbar spine most notable on the x-ray at the L5-S1 level along with underlying SI joint dysfunction  There were no acute fractures or dislocations noted on the x-ray  Follow-up with visual radiology report  He does not have any red flag symptoms or abnormal findings on exam   Advised that he continue with oral anti-inflammatory medication and muscle relaxant for the next 3 weeks  I will additionally recommend physical therapy that I would like him to complete for about 5 to 6 weeks  Plan is to follow-up again in office at that time for reevaluation  He will return if symptoms worsen and return precautions were provided  There is consideration for possible radiculopathy however his symptoms do not reflect radicular etiology and exam does not elicit these symptoms    If no improvement on follow-up MRI of the lumbar spine will be ordered    Subjective:      Patient ID: Sydney Jenkins is a 62 y o  male is presenting "today for initial evaluation of lower back pain  He states that the pain began along the week of Memorial Day without any inciting injury  He states that the pain began on the left lower lumbar region and did radiate into the gluteal and posterior thigh  There is no associated numbness or tingling that he reported and the pain sensation was described as a sharp pain  He had proceeded to the urgent care where he was prescribed prednisone and a muscle relaxer which states had alleviated pain symptoms  Last week his symptoms had recurred and he proceeded once again to the urgent care where he was given a refill on these medications  He states today that his pain symptoms are minimal however he is worried that the symptoms may flare once again  He denies any bowel or bladder incontinence no weight loss and no fevers or chills  No nighttime awakenings from back pain  His medical history is significant for recent plantar fasciitis and right knee meniscus tear which she states did alter his gait mechanics  HPI    The following portions of the patient's history were reviewed and updated as appropriate: allergies, current medications, past family history, past medical history, past social history, past surgical history and problem list     Review of Systems      Objective:      /90   Pulse 87   Ht 5' 8\" (1 727 m)   Wt 117 kg (259 lb)   SpO2 98%   BMI 39 38 kg/m²          Physical Exam      Patient is in no acute distress  Calm and cooperative  He exhibits full range of motion with flexion extension right and left rotation of the lumbar spine  The pain is exacerbated with lumbar extension and sidebending to the left    There is tenderness to palpation in the PSIS SI joint of the left side  He does not exhibit any motor weakness on exam and lower extremity and no sensory deficits to light touch  He is able to perform a heel and toe walk  No antalgic gait is noted  Negative slump test  There is no " tenderness to palpation over the lumbar spinous processes or paraspinal muscle

## 2023-06-19 ENCOUNTER — OFFICE VISIT (OUTPATIENT)
Dept: INTERNAL MEDICINE CLINIC | Facility: CLINIC | Age: 58
End: 2023-06-19
Payer: COMMERCIAL

## 2023-06-19 VITALS
DIASTOLIC BLOOD PRESSURE: 88 MMHG | BODY MASS INDEX: 39.56 KG/M2 | HEIGHT: 68 IN | HEART RATE: 94 BPM | SYSTOLIC BLOOD PRESSURE: 130 MMHG | OXYGEN SATURATION: 97 % | WEIGHT: 261 LBS | RESPIRATION RATE: 14 BRPM

## 2023-06-19 DIAGNOSIS — Z12.5 SCREENING FOR PROSTATE CANCER: ICD-10-CM

## 2023-06-19 DIAGNOSIS — M10.9 GOUT, UNSPECIFIED CAUSE, UNSPECIFIED CHRONICITY, UNSPECIFIED SITE: ICD-10-CM

## 2023-06-19 DIAGNOSIS — I10 BENIGN HYPERTENSION: ICD-10-CM

## 2023-06-19 DIAGNOSIS — M25.50 MULTIPLE JOINT PAIN: ICD-10-CM

## 2023-06-19 DIAGNOSIS — R76.8 ELEVATED RHEUMATOID FACTOR: ICD-10-CM

## 2023-06-19 DIAGNOSIS — Z00.00 ANNUAL PHYSICAL EXAM: Primary | ICD-10-CM

## 2023-06-19 DIAGNOSIS — R73.03 PREDIABETES: ICD-10-CM

## 2023-06-19 DIAGNOSIS — E78.2 MIXED HYPERLIPIDEMIA: ICD-10-CM

## 2023-06-19 PROCEDURE — 99396 PREV VISIT EST AGE 40-64: CPT | Performed by: PHYSICIAN ASSISTANT

## 2023-06-19 RX ORDER — METFORMIN HYDROCHLORIDE 500 MG/1
500 TABLET, EXTENDED RELEASE ORAL
Qty: 30 TABLET | Refills: 5 | Status: SHIPPED | OUTPATIENT
Start: 2023-06-19

## 2023-06-19 RX ORDER — ROSUVASTATIN CALCIUM 5 MG/1
5 TABLET, COATED ORAL DAILY
Qty: 30 TABLET | Refills: 0 | Status: SHIPPED | OUTPATIENT
Start: 2023-06-19

## 2023-06-19 NOTE — PATIENT INSTRUCTIONS
General medical exam is acceptable  Will need to start cholesterol medication to reduce cholesterol, therefore start rosuvastatin 5 mg daily at bedtime  We will start metformin 500 mg extended release at dinnertime  No other medication changes  We will schedule follow-up with repeat blood work in 4 months, sooner as needed  Wellness Visit for Adults   AMBULATORY CARE:   A wellness visit  is when you see your healthcare provider to get screened for health problems  Your healthcare provider will also give you advice on how to stay healthy  Write down your questions so you remember to ask them  Ask your healthcare provider how often you should have a wellness visit  What happens at a wellness visit:  Your healthcare provider will ask about your health, and your family history of health problems  This includes high blood pressure, heart disease, and cancer  He or she will ask if you have symptoms that concern you, if you smoke, and about your mood  You may also be asked about your intake of medicines, supplements, food, and alcohol  Any of the following may be done: Your weight  will be checked  Your height may also be checked so your body mass index (BMI) can be calculated  Your BMI shows if you are at a healthy weight  Your blood pressure  and heart rate will be checked  Your temperature may also be checked  Blood and urine tests  may be done  Blood tests may be done to check your cholesterol levels  Abnormal cholesterol levels increase your risk for heart disease and stroke  You may also need a blood or urine test to check for diabetes if you are at increased risk  Urine tests may be done to look for signs of an infection or kidney disease  A physical exam  includes checking your heartbeat and lungs with a stethoscope  Your healthcare provider may also check your skin to look for sun damage  Screening tests  may be recommended   A screening test is done to check for diseases that may not cause symptoms  The screening tests you may need depend on your age, gender, family history, and lifestyle habits  For example, colorectal screening may be recommended if you are 48years old or older  Screening tests you need if you are a woman:   A Pap smear  is used to screen for cervical cancer  Pap smears are usually done every 3 to 5 years depending on your age  You may need them more often if you have had abnormal Pap smear test results in the past  Ask your healthcare provider how often you should have a Pap smear  A mammogram  is an x-ray of your breasts to screen for breast cancer  Experts recommend mammograms every 2 years starting at age 48 years  You may need a mammogram at age 52 years or younger if you have an increased risk for breast cancer  Talk to your healthcare provider about when you should start having mammograms and how often you need them  Vaccines you may need:   Get an influenza vaccine  every year  The influenza vaccine protects you from the flu  Several types of viruses cause the flu  The viruses change over time, so new vaccines are made each year  Get a tetanus-diphtheria (Td) booster vaccine  every 10 years  This vaccine protects you against tetanus and diphtheria  Tetanus is a severe infection that may cause painful muscle spasms and lockjaw  Diphtheria is a severe bacterial infection that causes a thick covering in the back of your mouth and throat  Get a human papillomavirus (HPV) vaccine  if you are female and aged 23 to 32 or male 23 to 24 and never received it  This vaccine protects you from HPV infection  HPV is the most common infection spread by sexual contact  HPV may also cause vaginal, penile, and anal cancers  Get a pneumococcal vaccine  if you are aged 72 years or older  The pneumococcal vaccine is an injection given to protect you from pneumococcal disease  Pneumococcal disease is an infection caused by pneumococcal bacteria   The infection may cause pneumonia, meningitis, or an ear infection  Get a shingles vaccine  if you are 60 or older, even if you have had shingles before  The shingles vaccine is an injection to protect you from the varicella-zoster virus  This is the same virus that causes chickenpox  Shingles is a painful rash that develops in people who had chickenpox or have been exposed to the virus  How to eat healthy:  My Plate is a model for planning healthy meals  It shows the types and amounts of foods that should go on your plate  Fruits and vegetables make up about half of your plate, and grains and protein make up the other half  A serving of dairy is included on the side of your plate  The amount of calories and serving sizes you need depends on your age, gender, weight, and height  Examples of healthy foods are listed below:  Eat a variety of vegetables  such as dark green, red, and orange vegetables  You can also include canned vegetables low in sodium (salt) and frozen vegetables without added butter or sauces  Eat a variety of fresh fruits , canned fruit in 100% juice, frozen fruit, and dried fruit  Include whole grains  At least half of the grains you eat should be whole grains  Examples include whole-wheat bread, wheat pasta, brown rice, and whole-grain cereals such as oatmeal     Eat a variety of protein foods such as seafood (fish and shellfish), lean meat, and poultry without skin (turkey and chicken)  Examples of lean meats include pork leg, shoulder, or tenderloin, and beef round, sirloin, tenderloin, and extra lean ground beef  Other protein foods include eggs and egg substitutes, beans, peas, soy products, nuts, and seeds  Choose low-fat dairy products such as skim or 1% milk or low-fat yogurt, cheese, and cottage cheese  Limit unhealthy fats  such as butter, hard margarine, and shortening  Exercise:  Exercise at least 30 minutes per day on most days of the week   Some examples of exercise include walking, biking, dancing, and swimming  You can also fit in more physical activity by taking the stairs instead of the elevator or parking farther away from stores  Include muscle strengthening activities 2 days each week  Regular exercise provides many health benefits  It helps you manage your weight, and decreases your risk for type 2 diabetes, heart disease, stroke, and high blood pressure  Exercise can also help improve your mood  Ask your healthcare provider about the best exercise plan for you  General health and safety guidelines:   Do not smoke  Nicotine and other chemicals in cigarettes and cigars can cause lung damage  Ask your healthcare provider for information if you currently smoke and need help to quit  E-cigarettes or smokeless tobacco still contain nicotine  Talk to your healthcare provider before you use these products  Limit alcohol  A drink of alcohol is 12 ounces of beer, 5 ounces of wine, or 1½ ounces of liquor  Lose weight, if needed  Being overweight increases your risk of certain health conditions  These include heart disease, high blood pressure, type 2 diabetes, and certain types of cancer  Protect your skin  Do not sunbathe or use tanning beds  Use sunscreen with a SPF 15 or higher  Apply sunscreen at least 15 minutes before you go outside  Reapply sunscreen every 2 hours  Wear protective clothing, hats, and sunglasses when you are outside  Drive safely  Always wear your seatbelt  Make sure everyone in your car wears a seatbelt  A seatbelt can save your life if you are in an accident  Do not use your cell phone when you are driving  This could distract you and cause an accident  Pull over if you need to make a call or send a text message  Practice safe sex  Use latex condoms if are sexually active and have more than one partner  Your healthcare provider may recommend screening tests for sexually transmitted infections (STIs)      Wear helmets, lifejackets, and protective gear   Always wear a helmet when you ride a bike or motorcycle, go skiing, or play sports that could cause a head injury  Wear protective equipment when you play sports  Wear a lifejacket when you are on a boat or doing water sports  © Copyright [facility] 2022 Information is for End User's use only and may not be sold, redistributed or otherwise used for commercial purposes  The above information is an  only  It is not intended as medical advice for individual conditions or treatments  Talk to your doctor, nurse or pharmacist before following any medical regimen to see if it is safe and effective for you

## 2023-06-19 NOTE — PROGRESS NOTES
Assessment/Plan:   Patient Instructions   General medical exam is acceptable  Will need to start cholesterol medication to reduce cholesterol, therefore start rosuvastatin 5 mg daily at bedtime  We will start metformin 500 mg extended release at dinnertime  No other medication changes  We will schedule follow-up with repeat blood work in 4 months, sooner as needed  Wellness Visit for Adults   AMBULATORY CARE:   A wellness visit  is when you see your healthcare provider to get screened for health problems  Your healthcare provider will also give you advice on how to stay healthy  Write down your questions so you remember to ask them  Ask your healthcare provider how often you should have a wellness visit  What happens at a wellness visit:  Your healthcare provider will ask about your health, and your family history of health problems  This includes high blood pressure, heart disease, and cancer  He or she will ask if you have symptoms that concern you, if you smoke, and about your mood  You may also be asked about your intake of medicines, supplements, food, and alcohol  Any of the following may be done: Your weight  will be checked  Your height may also be checked so your body mass index (BMI) can be calculated  Your BMI shows if you are at a healthy weight  Your blood pressure  and heart rate will be checked  Your temperature may also be checked  Blood and urine tests  may be done  Blood tests may be done to check your cholesterol levels  Abnormal cholesterol levels increase your risk for heart disease and stroke  You may also need a blood or urine test to check for diabetes if you are at increased risk  Urine tests may be done to look for signs of an infection or kidney disease  A physical exam  includes checking your heartbeat and lungs with a stethoscope  Your healthcare provider may also check your skin to look for sun damage  Screening tests  may be recommended   A screening test is done to check for diseases that may not cause symptoms  The screening tests you may need depend on your age, gender, family history, and lifestyle habits  For example, colorectal screening may be recommended if you are 48years old or older  Screening tests you need if you are a woman:   A Pap smear  is used to screen for cervical cancer  Pap smears are usually done every 3 to 5 years depending on your age  You may need them more often if you have had abnormal Pap smear test results in the past  Ask your healthcare provider how often you should have a Pap smear  A mammogram  is an x-ray of your breasts to screen for breast cancer  Experts recommend mammograms every 2 years starting at age 48 years  You may need a mammogram at age 52 years or younger if you have an increased risk for breast cancer  Talk to your healthcare provider about when you should start having mammograms and how often you need them  Vaccines you may need:   Get an influenza vaccine  every year  The influenza vaccine protects you from the flu  Several types of viruses cause the flu  The viruses change over time, so new vaccines are made each year  Get a tetanus-diphtheria (Td) booster vaccine  every 10 years  This vaccine protects you against tetanus and diphtheria  Tetanus is a severe infection that may cause painful muscle spasms and lockjaw  Diphtheria is a severe bacterial infection that causes a thick covering in the back of your mouth and throat  Get a human papillomavirus (HPV) vaccine  if you are female and aged 23 to 32 or male 23 to 24 and never received it  This vaccine protects you from HPV infection  HPV is the most common infection spread by sexual contact  HPV may also cause vaginal, penile, and anal cancers  Get a pneumococcal vaccine  if you are aged 72 years or older  The pneumococcal vaccine is an injection given to protect you from pneumococcal disease   Pneumococcal disease is an infection caused by pneumococcal bacteria  The infection may cause pneumonia, meningitis, or an ear infection  Get a shingles vaccine  if you are 60 or older, even if you have had shingles before  The shingles vaccine is an injection to protect you from the varicella-zoster virus  This is the same virus that causes chickenpox  Shingles is a painful rash that develops in people who had chickenpox or have been exposed to the virus  How to eat healthy:  My Plate is a model for planning healthy meals  It shows the types and amounts of foods that should go on your plate  Fruits and vegetables make up about half of your plate, and grains and protein make up the other half  A serving of dairy is included on the side of your plate  The amount of calories and serving sizes you need depends on your age, gender, weight, and height  Examples of healthy foods are listed below:  Eat a variety of vegetables  such as dark green, red, and orange vegetables  You can also include canned vegetables low in sodium (salt) and frozen vegetables without added butter or sauces  Eat a variety of fresh fruits , canned fruit in 100% juice, frozen fruit, and dried fruit  Include whole grains  At least half of the grains you eat should be whole grains  Examples include whole-wheat bread, wheat pasta, brown rice, and whole-grain cereals such as oatmeal     Eat a variety of protein foods such as seafood (fish and shellfish), lean meat, and poultry without skin (turkey and chicken)  Examples of lean meats include pork leg, shoulder, or tenderloin, and beef round, sirloin, tenderloin, and extra lean ground beef  Other protein foods include eggs and egg substitutes, beans, peas, soy products, nuts, and seeds  Choose low-fat dairy products such as skim or 1% milk or low-fat yogurt, cheese, and cottage cheese  Limit unhealthy fats  such as butter, hard margarine, and shortening  Exercise:  Exercise at least 30 minutes per day on most days of the week   Some examples of exercise include walking, biking, dancing, and swimming  You can also fit in more physical activity by taking the stairs instead of the elevator or parking farther away from stores  Include muscle strengthening activities 2 days each week  Regular exercise provides many health benefits  It helps you manage your weight, and decreases your risk for type 2 diabetes, heart disease, stroke, and high blood pressure  Exercise can also help improve your mood  Ask your healthcare provider about the best exercise plan for you  General health and safety guidelines:   Do not smoke  Nicotine and other chemicals in cigarettes and cigars can cause lung damage  Ask your healthcare provider for information if you currently smoke and need help to quit  E-cigarettes or smokeless tobacco still contain nicotine  Talk to your healthcare provider before you use these products  Limit alcohol  A drink of alcohol is 12 ounces of beer, 5 ounces of wine, or 1½ ounces of liquor  Lose weight, if needed  Being overweight increases your risk of certain health conditions  These include heart disease, high blood pressure, type 2 diabetes, and certain types of cancer  Protect your skin  Do not sunbathe or use tanning beds  Use sunscreen with a SPF 15 or higher  Apply sunscreen at least 15 minutes before you go outside  Reapply sunscreen every 2 hours  Wear protective clothing, hats, and sunglasses when you are outside  Drive safely  Always wear your seatbelt  Make sure everyone in your car wears a seatbelt  A seatbelt can save your life if you are in an accident  Do not use your cell phone when you are driving  This could distract you and cause an accident  Pull over if you need to make a call or send a text message  Practice safe sex  Use latex condoms if are sexually active and have more than one partner  Your healthcare provider may recommend screening tests for sexually transmitted infections (STIs)      Wear helmets, lifejackets, and protective gear  Always wear a helmet when you ride a bike or motorcycle, go skiing, or play sports that could cause a head injury  Wear protective equipment when you play sports  Wear a lifejacket when you are on a boat or doing water sports  © Copyright Linda Agarwal 2022 Information is for End User's use only and may not be sold, redistributed or otherwise used for commercial purposes  The above information is an  only  It is not intended as medical advice for individual conditions or treatments  Talk to your doctor, nurse or pharmacist before following any medical regimen to see if it is safe and effective for you  Quality Measures: BMI Counseling: Body mass index is 39 68 kg/m²  The BMI is above normal  Nutrition recommendations include decreasing portion sizes, encouraging healthy choices of fruits and vegetables, consuming healthier snacks, moderation in carbohydrate intake and reducing intake of cholesterol  Exercise recommendations include exercising 3-5 times per week  Rationale for BMI follow-up plan is due to patient being overweight or obese  Return in about 4 months (around 10/19/2023) for Next scheduled follow up  Diagnoses and all orders for this visit:    Annual physical exam    Benign hypertension  -     CBC and differential; Future  -     Comprehensive metabolic panel; Future    Mixed hyperlipidemia  -     Lipid panel; Future  -     rosuvastatin (CRESTOR) 5 mg tablet; Take 1 tablet (5 mg total) by mouth daily    Prediabetes  -     Hemoglobin A1C; Future  -     metFORMIN (GLUCOPHAGE-XR) 500 mg 24 hr tablet; Take 1 tablet (500 mg total) by mouth daily with dinner    Gout, unspecified cause, unspecified chronicity, unspecified site    Elevated rheumatoid factor    Screening for prostate cancer  -     PSA, Total Screen; Future    Multiple joint pain  -     GISELLE Screen w/ Reflex to Titer/Pattern; Future  -     C-reactive protein;  Future  - Cyclic citrul peptide antibody, IgG; Future  -     Lyme Total Antibody Profile with reflex to WB; Future  -     RF Screen w/ Reflex to Titer; Future  -     Sedimentation rate, automated; Future          Subjective:      Patient ID: Aidee Wilson is a 62 y o  male  51-year-old male presents for his annual physical   He states he has had a bad years secondary to many joint issues including a tear of his lateral meniscus of his right knee, gouty arthritis, low back pain  Patient does have a history of an elevated rheumatoid factor, but no rheumatologic tests noted in this EMR  Prediabetes: A1c 6 3 with fasting sugar 138  Denies polyuria, polyphagia, polydipsia  Renal function stable with normal GFR  Hyperlipidemia: Elevated total cholesterol and LDL  Decent HDL cholesterol  Hypertension: Acceptable control on his current medications  [place] cp, palp, sob, edema, HA, dizziness, diaphoresis, syncope, visual disturbance  Gout: Has recently been quiescent  Had previous problems over the year with gout  Obesity: Patient states he is having trouble controlling his weight  He believes his diet is pretty good however is not able to exercise due to his multiple joint complaints        ALLERGIES:  No Known Allergies    CURRENT MEDICATIONS:    Current Outpatient Medications:   •  albuterol (PROVENTIL HFA,VENTOLIN HFA) 90 mcg/act inhaler, Inhale 2 puffs every 6 (six) hours as needed for wheezing, Disp: 6 7 g, Rfl: 3  •  amLODIPine (NORVASC) 10 mg tablet, TAKE 1 TABLET BY MOUTH EVERY DAY, Disp: 90 tablet, Rfl: 1  •  cyclobenzaprine (FLEXERIL) 5 mg tablet, Take 1 tablet (5 mg total) by mouth 3 (three) times a day as needed for muscle spasms, Disp: 90 tablet, Rfl: 0  •  ergocalciferol (VITAMIN D2) 50,000 units, TAKE 1 CAPSULE BY MOUTH ONE TIME PER WEEK, Disp: 12 capsule, Rfl: 2  •  losartan (COZAAR) 100 MG tablet, Take 1 tablet (100 mg total) by mouth daily, Disp: 90 tablet, Rfl: 1  •  metFORMIN (GLUCOPHAGE-XR) 500 mg 24 hr tablet, Take 1 tablet (500 mg total) by mouth daily with dinner, Disp: 30 tablet, Rfl: 5  •  naproxen (Naprosyn) 500 mg tablet, Take 1 tablet (500 mg total) by mouth 2 (two) times a day with meals, Disp: 60 tablet, Rfl: 0  •  rosuvastatin (CRESTOR) 5 mg tablet, Take 1 tablet (5 mg total) by mouth daily, Disp: 30 tablet, Rfl: 0  •  tadalafil (CIALIS) 10 MG tablet, Take 1 tablet (10 mg total) by mouth daily as needed for erectile dysfunction, Disp: 10 tablet, Rfl: 2    ACTIVE PROBLEM LIST:  Patient Active Problem List   Diagnosis   • Benign hypertension   • LVH (left ventricular hypertrophy)   • Major depressive disorder, single episode, moderate ([facility])   • Obesity   • Vitamin D deficiency   • Acute pain of right knee   • Sleeping difficulty   • Gouty arthritis of right great toe   • Left foot pain   • Gout   • Prediabetes   • Mixed hyperlipidemia   • Depression, recurrent ([facility])   • Tear of lateral meniscus of right knee   • Internal derangement of right knee   • Costochondritis   • Elevated rheumatoid factor   • Erectile dysfunction       PAST MEDICAL HISTORY:  Past Medical History:   Diagnosis Date   • Dyspnea on exertion     last assessed: 3/11/2016   • Hypertension    • [facility] 12/19/2016       PAST SURGICAL HISTORY:  Past Surgical History:   Procedure Laterality Date   • FIBULA FRACTURE SURGERY     • HERNIA REPAIR     • TONSILLECTOMY         FAMILY HISTORY:  Family History   Problem Relation Age of Onset   • Leukemia Mother         acute myeloid   • Hypertension Mother    • Multiple myeloma Mother    • Diabetes Father         mellitus   • Hypertension Father    • Pneumonia Father         NSIP (nonspecific interstital pneumonia)       SOCIAL HISTORY:  Social History     Socioeconomic History   • Marital status:      Spouse name: Not on file   • Number of children: 0   • Years of education: Not on file   • Highest education level: Not on file   Occupational History   • Occupation:  for [address] [place] [address] Avenue: full-time employment   Tobacco Use   • Smoking status: Never   • Smokeless tobacco: Never   Vaping Use   • Vaping Use: Never used   Substance and Sexual Activity   • Alcohol use: Not Currently     Comment: no alchol use (as per allscripts)   • Drug use: Not Currently     Types: Marijuana   • Sexual activity: Not Currently   Other Topics Concern   • Not on file   Social History Narrative    Brushes teeth twice a day    Dental care, regularly    Exercise: walking         Social Determinants of Health     Financial Resource Strain: Not on file   Food Insecurity: Not on file   Transportation Needs: Not on file   Physical Activity: Not on file   Stress: Not on file   Social Connections: Not on file   Intimate Partner Violence: Not on file   Housing Stability: Not on file       Review of Systems   Constitutional: Negative for activity change, chills, fatigue and fever  HENT: Negative for congestion  Eyes: Negative for discharge  Respiratory: Negative for cough, chest tightness and shortness of breath  Cardiovascular: Negative for chest pain, palpitations and leg swelling  Gastrointestinal: Negative for abdominal pain, blood in stool, constipation, diarrhea, nausea and vomiting  Endocrine: Negative for polydipsia, polyphagia and polyuria  Genitourinary: Negative for difficulty urinating  Musculoskeletal: Positive for arthralgias and back pain  Negative for myalgias  Skin: Negative for rash  Allergic/Immunologic: Negative for immunocompromised state  Neurological: Negative for dizziness, syncope, weakness, light-headedness and headaches  Hematological: Negative for adenopathy  Does not bruise/bleed easily  Psychiatric/Behavioral: Negative for dysphoric mood, sleep disturbance and suicidal ideas  The patient is not nervous/anxious            Objective:  Vitals:    06/19/23 1415 06/19/23 1450   BP: 126/90 130/88 "  BP Location: Left arm Left arm   Patient Position: Sitting Sitting   Pulse: 94    Resp: 14    SpO2: 97%    Weight: 118 kg (261 lb)    Height: 5' 8\" (1 727 m)      Body mass index is 39 68 kg/m²  Physical Exam  Vitals and nursing note reviewed  Constitutional:       General: He is not in acute distress  Appearance: Normal appearance  He is well-developed  He is obese  He is not ill-appearing  Comments: Well-developed obese 55-year-old male in no acute distress  HENT:      Head: Normocephalic  Right Ear: Tympanic membrane, ear canal and external ear normal       Left Ear: Tympanic membrane, ear canal and external ear normal       Nose: Nose normal       Mouth/Throat:      Mouth: Mucous membranes are moist       Pharynx: Oropharynx is clear  No oropharyngeal exudate  Comments: Thickened posterior pharynx  Eyes:      General: No scleral icterus  Right eye: No discharge  Left eye: No discharge  Extraocular Movements: Extraocular movements intact  Conjunctiva/sclera: Conjunctivae normal       Pupils: Pupils are equal, round, and reactive to light  Comments: Nystagmus on lateral gaze   Neck:      Thyroid: No thyromegaly  Vascular: No carotid bruit or JVD  Trachea: No tracheal deviation  Cardiovascular:      Rate and Rhythm: Normal rate and regular rhythm  Pulses: Normal pulses  Heart sounds: Normal heart sounds  No murmur heard  No friction rub  No gallop  Pulmonary:      Effort: Pulmonary effort is normal  No respiratory distress  Breath sounds: Normal breath sounds  No wheezing or rales  Chest:      Chest wall: No tenderness  Abdominal:      General: Bowel sounds are normal  There is no distension  Palpations: Abdomen is soft  There is no hepatomegaly, splenomegaly or mass  Tenderness: There is no abdominal tenderness  There is no right CVA tenderness, left CVA tenderness, guarding or rebound        Comments: Obese " Genitourinary:     Comments: Circumcised adult male  No lesions of the phallus, scrotum, or testes  No inguinal hernias  Musculoskeletal:         General: No tenderness or deformity  Normal range of motion  Cervical back: Normal range of motion and neck supple  Right lower leg: Edema (Trace edema) present  Left lower leg: Edema (Trace edema) present  Lymphadenopathy:      Cervical: No cervical adenopathy  Skin:     General: Skin is warm and dry  Findings: No rash  Neurological:      General: No focal deficit present  Mental Status: He is alert and oriented to person, place, and time  Cranial Nerves: No cranial nerve deficit  Sensory: No sensory deficit  Motor: No weakness or abnormal muscle tone  Coordination: Coordination normal       Gait: Gait normal       Deep Tendon Reflexes: Reflexes are normal and symmetric  Reflexes normal    Psychiatric:         Mood and Affect: Mood normal          Behavior: Behavior normal          Thought Content: Thought content normal          Judgment: Judgment normal            RESULTS:    In chart    This note was created with voice recognition software  Phonic, grammatical and spelling errors may be present within the note as a result

## 2023-06-30 ENCOUNTER — OFFICE VISIT (OUTPATIENT)
Dept: INTERNAL MEDICINE CLINIC | Facility: CLINIC | Age: 58
End: 2023-06-30
Payer: COMMERCIAL

## 2023-06-30 VITALS
RESPIRATION RATE: 16 BRPM | HEART RATE: 91 BPM | SYSTOLIC BLOOD PRESSURE: 140 MMHG | HEIGHT: 68 IN | OXYGEN SATURATION: 95 % | WEIGHT: 259 LBS | BODY MASS INDEX: 39.25 KG/M2 | DIASTOLIC BLOOD PRESSURE: 96 MMHG

## 2023-06-30 DIAGNOSIS — H61.23 BILATERAL HEARING LOSS DUE TO CERUMEN IMPACTION: Primary | ICD-10-CM

## 2023-06-30 NOTE — PROGRESS NOTES
Ear cerumen removal    Date/Time: 6/30/2023 3:00 PM    Performed by: Sade Larson PA-C  Authorized by: Sade Larson PA-C  Universal Protocol:  Consent: Verbal consent obtained  Risks and benefits: risks, benefits and alternatives were discussed  Consent given by: patient  Patient understanding: patient states understanding of the procedure being performed  Patient identity confirmed: verbally with patient      Patient location:  Clinic  Procedure details:     Local anesthetic:  None    Location:  L ear and R ear    Procedure type: irrigation only      Approach:  External and natural orifice    Visualization (free text):  Otoscope  Post-procedure details:     Complication:  None    Hearing quality:  Improved    Patient tolerance of procedure: Tolerated well, no immediate complications  Comments:      Postprocedure tympanic membranes well visualized, normal light reflexes and landmarks

## 2023-07-03 ENCOUNTER — EVALUATION (OUTPATIENT)
Dept: PHYSICAL THERAPY | Facility: CLINIC | Age: 58
End: 2023-07-03
Payer: COMMERCIAL

## 2023-07-03 DIAGNOSIS — M53.3 SACROILIAC JOINT DYSFUNCTION OF LEFT SIDE: ICD-10-CM

## 2023-07-03 DIAGNOSIS — M51.36 DEGENERATIVE DISC DISEASE, LUMBAR: Primary | ICD-10-CM

## 2023-07-03 PROCEDURE — 97110 THERAPEUTIC EXERCISES: CPT

## 2023-07-03 PROCEDURE — 97161 PT EVAL LOW COMPLEX 20 MIN: CPT

## 2023-07-03 NOTE — PROGRESS NOTES
PT Evaluation     Today's date: 7/3/2023  Patient name: Jason Jose  : 1965  MRN: [id number]  Referring provider: Moriah Veloz DO  Dx:   Encounter Diagnosis     ICD-10-CM    1. Degenerative disc disease, lumbar  M51.36 Ambulatory Referral to Physical Therapy      2. Sacroiliac joint dysfunction of left side  M53.3 Ambulatory Referral to Physical Therapy          Start Time:   Stop Time: 930  Total time in clinic (min): 38 minutes    Assessment  Assessment details: Jason Jose is a a pleasant 62 y.o. male who presents today with pain, decreased strength, decreased ROM, ambulatory dysfunction and postural dysfunction. The patient's clinical presentation is consistent with his diagnosis of LBP. Alan complains of sharp radiating pain in the back ranging from 1/10 to 10/10. Pain is exacerbated by activity and made better by medication or rest.    The patient demonstrates minimally decreased strength during resisted muscle testing. Pt ROM is minimally decreased with pain at end ranges. The patient has difficulty with leisure, sleeping, athletics and work. Patient will benefit from skilled physical therapy, including therapeutic exercise, stretching, manual therapy and modalities prn to improve their level of function, to increase overall quality of life, and to address his impairments. Dispensed home exercise program and educated patient on proper technique, frequency, and the possibility of DOMS for the next 24 to 48 hours. Patient demonstrated understanding and was advised to call us if he has any further questions.   Impairments: abnormal coordination, abnormal gait, abnormal muscle firing, abnormal or restricted ROM, abnormal movement, activity intolerance, impaired balance, impaired physical strength, lacks appropriate home exercise program, pain with function, safety issue, weight-bearing intolerance, poor posture  and poor body mechanics  Understanding of Dx/Px/POC: excellent  Goals  ST. Independent with HEP in 2 weeks. 2. Pt will have verbal report of improvement in symptoms by >/=25% in 2 weeks. 3. Decrease pain to 6/10 at it's worst.  4. Increase strength by 1/2 grade in all deficient planes. To be achieved by D/C   LT. Pt will improve FOTO score by >/= goal points in 6 weeks. 2. Pt will improve FOTO score to >/= goal score by visit # 12.  3. Pt will be able to stand for an hour with little to no difficulty. 4. Pt will be able to perform his usual activities including walking and exercise with little to no difficulty. 5. Pt will be able to sit at desk for a full shift with proper posture with little to no difficulty. Plan  Patient would benefit from: skilled PT  Planned modality interventions: cryotherapy, hydrotherapy, TENS and unattended electrical stimulation  Planned therapy interventions: activity modification, ADL retraining, balance, balance/weight bearing training, behavior modification, body mechanics training, functional ROM exercises, gait training, home exercise program, IADL retraining, joint mobilization, manual therapy, massage, neuromuscular re-education, patient education, strengthening, stretching, therapeutic activities, therapeutic exercise and transfer training  Frequency: 2x week  Duration in weeks: 8  Plan of Care beginning date: 7/3/2023  Plan of Care expiration date: 9/3/2023  Treatment plan discussed with: patient        Subjective Evaluation    History of Present Illness  Mechanism of injury: Shira Darby presents today with complaints of LBP that began over a month ago. Mechanism of injury was atraumatic, he notes his pain worsened pain in the L low back that radiated down the left leg to the leg below the butt cheek. He notes it is worse in the morning. Pt had X-ray performed on  that shows the following: No acute osseous abnormalities.      The patient also reports decreased endurance and difficulty with function. Relevant PMH includes plantar fasciitis in R foot, knee pain, HTN. Pain  Current pain ratin  At best pain ratin  At worst pain rating: 10  Quality: sharp and radiating  Aggravating factors: lifting and walking      Diagnostic Tests  X-ray: normal  Treatments  Previous treatment: chiropractic  Patient Goals  Patient goals for therapy: decreased pain, increased motion, return to sport/leisure activities, independence with ADLs/IADLs and increased strength          Objective     Neurological Testing     Sensation     Lumbar   Left   Intact: light touch    Right   Intact: light touch    Active Range of Motion     Lumbar   Normal active range of motion  Mechanical Assessment    Cervical      Thoracic      Lumbar    Sitting flexion: repeated movements  Pain location: no change  Lying extension: sustained positions  Pain location: no change    Strength/Myotome Testing     Left Hip   Planes of Motion   Flexion: 4  Abduction: 4  Adduction: 4    Right Hip   Planes of Motion   Flexion: 4+  Abduction: 4  Adduction: 4    Left Knee   Flexion: 4+  Extension: 4+    Right Knee   Flexion: 4+  Extension: 4+    Left Ankle/Foot   Dorsiflexion: 4+    Right Ankle/Foot   Dorsiflexion: 4+    Tests     Lumbar     Left   Negative passive SLR. Right   Negative passive SLR. Left Pelvic Girdle/Sacrum   Positive: active SLR test (lowering pain in back). Right Pelvic Girdle/Sacrum   Negative: active SLR test.     Left Hip   Negative HUDSON and FADIR. Right Hip   Negative HUDSON and FADIR. Additional Tests Details  SIJ Testing negative - Leg Length testing 90.5cm L - 91.5cm R    General Comments:    Lower quarter screen   Hips: unremarkable  Foot/ankle: unremarkable    Knee Comments  Hx of R knee pain             Precautions: HTN    Access Code: [email or web address]  URL: [email or web address]  Date: 2023  Prepared by: Libby Rater    Exercises  - Curl Up with Arms Crossed  - 1 x daily - 7 x weekly - 2 sets - 15 reps - 2 hold  - Standing Side Plank on Wall  - 1 x daily - 7 x weekly - 3 sets - 10 reps  - Bird Dog on Counter  - 1 x daily - 7 x weekly - 3 sets - 10 reps  - Hip Flexor Stretch on Step  - 1 x daily - 7 x weekly - 3 sets - 10 reps  - Seated Hamstring Stretch  - 1 x daily - 7 x weekly - 3 sets - 10 reps    Manuals 7/3            IASTM Lumbar             PA mobs                                       Neuro Re-Ed             PPT              PPT Bridge             Abdominal Series             TA PB Press                          TA march             TA BKFO             TA deadbug             TA 1/4 crunch 20x5"            Webslide M, L             Ther Ex             Nustep                          Bird dog 5x10"            Side plank on wall 5x10"                         TB seated multifidus             TB chop/lift             Anti-rotation TB             Piriformis str             HS str             LTR             Ther Activity             TG             FSU             Suitcase carry             [place] carry             Gait Training                                       Modalities             IP             TENS

## 2023-07-11 DIAGNOSIS — R73.03 PREDIABETES: ICD-10-CM

## 2023-07-11 DIAGNOSIS — E78.2 MIXED HYPERLIPIDEMIA: ICD-10-CM

## 2023-07-11 RX ORDER — ROSUVASTATIN CALCIUM 5 MG/1
5 TABLET, COATED ORAL DAILY
Qty: 90 TABLET | Refills: 1 | Status: SHIPPED | OUTPATIENT
Start: 2023-07-11

## 2023-07-11 RX ORDER — METFORMIN HYDROCHLORIDE 500 MG/1
TABLET, EXTENDED RELEASE ORAL
Qty: 90 TABLET | Refills: 2 | Status: SHIPPED | OUTPATIENT
Start: 2023-07-11

## 2023-07-13 ENCOUNTER — OFFICE VISIT (OUTPATIENT)
Dept: PHYSICAL THERAPY | Facility: CLINIC | Age: 58
End: 2023-07-13
Payer: COMMERCIAL

## 2023-07-13 DIAGNOSIS — M53.3 SACROILIAC JOINT DYSFUNCTION OF LEFT SIDE: ICD-10-CM

## 2023-07-13 DIAGNOSIS — M51.36 DEGENERATIVE DISC DISEASE, LUMBAR: Primary | ICD-10-CM

## 2023-07-13 PROCEDURE — 97112 NEUROMUSCULAR REEDUCATION: CPT

## 2023-07-13 PROCEDURE — 97110 THERAPEUTIC EXERCISES: CPT

## 2023-07-13 NOTE — PROGRESS NOTES
Daily Note    Today's date: 23  Patient name: Socorro Ulloa  : 1965  MRN: [id number]  Referring provider: Chris Heredia DO  Dx:   Encounter Diagnosis     ICD-10-CM    1. Degenerative disc disease, lumbar  M51.36       2. Sacroiliac joint dysfunction of left side  M53.3           Start Time: 8552  Stop Time: 1835  Total time in clinic (min): 41 minutes      Subjective: Carmelita Arias reports no pain at this time. He notes adherence to HEP and feels great today. Objective: See treatment diary below. Assessment: Alan tolerated treatment well with consistent cuing throughout. TE's were performed with increased resistance and increased reps. New TE's were demonstrated with proper technique, and tolerated well. Following treatment, the patient demonstrated fatigue post treatment, exhibited good technique with therapeutic exercises and would benefit from continued PT. Plan: Continue per plan of care. Progress treatment as tolerated. Precautions: HTN    POC expires Auth Status Unit limit Start date  Expiration date PT/OT + Visit Limit? [address] [place] [address]   9/3/23 Approved [facility] 7/3/23 12/31/23 12               Visit/Unit Tracking  AUTH Status:  Date 7/3 7/13             Approved Used 1 2              Remaining  11 10               Access Code: [email or web address]  URL: [email or web address]  Date: 2023  Prepared by: Raphael Backbone    Exercises  - Curl Up with Arms Crossed  - 1 x daily - 7 x weekly - 2 sets - 15 reps - 2 hold  - Standing Side Plank on Wall  - 1 x daily - 7 x weekly - 3 sets - 10 reps  - Bird Dog on Counter  - 1 x daily - 7 x weekly - 3 sets - 10 reps  - Hip Flexor Stretch on Step  - 1 x daily - 7 x weekly - 3 sets - 10 reps  - Seated Hamstring Stretch  - 1 x daily - 7 x weekly - 3 sets - 10 reps    Manuals 7/3 7/13           IASTM Lumbar             PA mobs                                       Neuro Re-Ed             PPT             PPT Bridge  2x10           Abdominal Series TA PB Press  20x5"                        TA march             TA BKFO             TA deadbug  2x10 3"           TA 1/4 crunch 20x5"            Webslide M, L  Black 3x10           Ther Ex             Nustep for conditioning and ROM  10' L5                        Bird dog 5x10"            Side plank on wall 5x10"            Hip adduction  20x5"           Clamshell  2x10 GTB            TB seated multifidus             TB chop/lift             Anti-rotation TB  10x10"           Piriformis str             HS str             LTR             Ther Activity             TG             FSU             Suitcase carry             [place] carry             Gait Training                                       Modalities             IP             TENS                  JIMMY Barba, PT  7/13/2023,7:13 PM

## 2023-07-14 DIAGNOSIS — M51.36 DEGENERATIVE DISC DISEASE, LUMBAR: ICD-10-CM

## 2023-07-14 DIAGNOSIS — M53.3 SACROILIAC JOINT DYSFUNCTION OF LEFT SIDE: ICD-10-CM

## 2023-07-14 RX ORDER — NAPROXEN 500 MG/1
TABLET ORAL
Qty: 60 TABLET | Refills: 0 | Status: SHIPPED | OUTPATIENT
Start: 2023-07-14

## 2023-07-19 DIAGNOSIS — M53.3 SACROILIAC JOINT DYSFUNCTION OF LEFT SIDE: ICD-10-CM

## 2023-07-19 DIAGNOSIS — M51.36 DEGENERATIVE DISC DISEASE, LUMBAR: ICD-10-CM

## 2023-07-20 ENCOUNTER — OFFICE VISIT (OUTPATIENT)
Dept: PHYSICAL THERAPY | Facility: CLINIC | Age: 58
End: 2023-07-20
Payer: COMMERCIAL

## 2023-07-20 ENCOUNTER — PATIENT MESSAGE (OUTPATIENT)
Dept: INTERNAL MEDICINE CLINIC | Facility: CLINIC | Age: 58
End: 2023-07-20

## 2023-07-20 DIAGNOSIS — R73.03 PREDIABETES: Primary | ICD-10-CM

## 2023-07-20 DIAGNOSIS — M53.3 SACROILIAC JOINT DYSFUNCTION OF LEFT SIDE: ICD-10-CM

## 2023-07-20 DIAGNOSIS — M51.36 DEGENERATIVE DISC DISEASE, LUMBAR: Primary | ICD-10-CM

## 2023-07-20 PROCEDURE — 97112 NEUROMUSCULAR REEDUCATION: CPT

## 2023-07-20 PROCEDURE — 97530 THERAPEUTIC ACTIVITIES: CPT

## 2023-07-20 PROCEDURE — 97110 THERAPEUTIC EXERCISES: CPT

## 2023-07-20 NOTE — PROGRESS NOTES
Daily Note    Today's date: 23  Patient name: Gloria Carmen  : 1965  MRN: [id number]  Referring provider: Azra Ramos DO  Dx:   Encounter Diagnosis     ICD-10-CM    1. Degenerative disc disease, lumbar  M51.36       2. Sacroiliac joint dysfunction of left side  M53.3           Start Time:   Stop Time:   Total time in clinic (min): 38 minutes      Subjective: Vince Smiley reports having had no pain this week. Notes adherence to HEP. Objective: See treatment diary below. Assessment: Alan tolerated treatment well with consistent cuing throughout. TE's were performed with increased resistance and increased reps. No new TE's were performed today. Following treatment, the patient demonstrated fatigue post treatment, exhibited good technique with therapeutic exercises and would benefit from continued PT. Plan: Continue per plan of care. Progress treatment as tolerated. Precautions: HTN    POC expires Auth Status Unit limit Start date  Expiration date PT/OT + Visit Limit? [address] [place] [address]   9/3/23 Approved [facility] 7/3/23 12/31/23 12               Visit/Unit Tracking  AUTH Status:  Date 7/3 7/13 7/20            Approved Used 1 2 3             Remaining  11 10 9              Access Code: [email or web address]  URL: [email or web address]  Date: 2023  Prepared by: Juan Mcguire    Exercises  - Curl Up with Arms Crossed  - 1 x daily - 7 x weekly - 2 sets - 15 reps - 2 hold  - Standing Side Plank on Wall  - 1 x daily - 7 x weekly - 3 sets - 10 reps  - Bird Dog on Counter  - 1 x daily - 7 x weekly - 3 sets - 10 reps  - Hip Flexor Stretch on Step  - 1 x daily - 7 x weekly - 3 sets - 10 reps  - Seated Hamstring Stretch  - 1 x daily - 7 x weekly - 3 sets - 10 reps    Manuals 7/3 7/13 7/20          IASTM Lumbar             PA mobs                                       Neuro Re-Ed             PPT             PPT Bridge  2x10           Abdominal Series             TA PB Press  20x5" TA march             TA BKFO             TA deadbug  2x10 3"           TA 1/4 crunch 20x5"            Lambert Michel  Black 3x10 Mariusz [id number] 23#          Ther Ex             Nustep for conditioning and ROM  10' L5 10' L7                       Bird dog 5x10"            Side plank on wall 5x10"            Hip adduction  20x5"           Clamshell  2x10 GTB            TB seated multifidus             TB chop/lift   Mariusz 7.5# 20x ea          Anti-rotation TB  10x10" [place] 8# 10x10"          Step str   90"          HS str   90"          LTR             Ther Activity             TG             FSU             Suitcase carry   15# 3 laps          [place] carry   15# 3 laps          Gait Training                                       Modalities             IP             TENS                  JIMMY Barba, PT  7/20/2023,6:06 PM

## 2023-07-21 RX ORDER — CYCLOBENZAPRINE HCL 5 MG
TABLET ORAL
Qty: 90 TABLET | Refills: 0 | Status: SHIPPED | OUTPATIENT
Start: 2023-07-21

## 2023-07-21 RX ORDER — BLOOD-GLUCOSE METER
EACH MISCELLANEOUS DAILY
Qty: 1 KIT | Refills: 1 | Status: SHIPPED | OUTPATIENT
Start: 2023-07-21

## 2023-07-21 RX ORDER — BLOOD SUGAR DIAGNOSTIC
STRIP MISCELLANEOUS
Qty: 100 EACH | Refills: 3 | Status: SHIPPED | OUTPATIENT
Start: 2023-07-21

## 2023-07-26 DIAGNOSIS — I10 BENIGN HYPERTENSION: ICD-10-CM

## 2023-07-26 DIAGNOSIS — I10 ESSENTIAL HYPERTENSION: ICD-10-CM

## 2023-07-26 RX ORDER — LOSARTAN POTASSIUM 100 MG/1
100 TABLET ORAL DAILY
Qty: 90 TABLET | Refills: 0 | Status: SHIPPED | OUTPATIENT
Start: 2023-07-26

## 2023-07-26 RX ORDER — AMLODIPINE BESYLATE 10 MG/1
10 TABLET ORAL DAILY
Qty: 90 TABLET | Refills: 0 | Status: SHIPPED | OUTPATIENT
Start: 2023-07-26

## 2023-08-01 ENCOUNTER — OFFICE VISIT (OUTPATIENT)
Dept: OBGYN CLINIC | Facility: CLINIC | Age: 58
End: 2023-08-01
Payer: COMMERCIAL

## 2023-08-01 VITALS
BODY MASS INDEX: 39.4 KG/M2 | WEIGHT: 260 LBS | SYSTOLIC BLOOD PRESSURE: 151 MMHG | DIASTOLIC BLOOD PRESSURE: 96 MMHG | HEIGHT: 68 IN | OXYGEN SATURATION: 98 % | HEART RATE: 95 BPM

## 2023-08-01 DIAGNOSIS — M53.3 SACROILIAC JOINT DYSFUNCTION OF LEFT SIDE: ICD-10-CM

## 2023-08-01 DIAGNOSIS — M51.36 DEGENERATIVE DISC DISEASE, LUMBAR: Primary | ICD-10-CM

## 2023-08-01 PROCEDURE — 99213 OFFICE O/P EST LOW 20 MIN: CPT | Performed by: FAMILY MEDICINE

## 2023-08-01 NOTE — PROGRESS NOTES
Assessment/Plan:    No problem-specific Assessment & Plan notes found for this encounter. Diagnoses and all orders for this visit:    Degenerative disc disease, lumbar    Sacroiliac joint dysfunction of left side      Patient can follow-up with me as needed. He is improved in terms of subjective pain symptoms and has no reproduction of pain on examination in office today. Advised that he can discontinue physical therapy and continue with a home exercise program but I advised him to continue working on core strengthening as the symptoms can recur with a weakened core. .  Return as needed if symptoms worsen or recur    Subjective:      Patient ID: Lamont Esparza is a 62 y.o. male presenting for follow-up visit in regards to lower back pain. Patient was seen in office approximately 6 weeks ago for initial evaluation. Pression at that time was that patient was suffering with symptoms associated with degenerative disc disease of lumbar spine and associated SI joint dysfunction. He feels that his symptoms have completely resolved. He has no limitations in terms of motion or function. He has been walking several miles a day. Has worked with physical therapy for 3 sessions. No numbness or tingling. No bowel or bladder incontinence. His medical history is significant for recent plantar fasciitis and right knee meniscus tear which she states did alter his gait mechanics.     HPI    The following portions of the patient's history were reviewed and updated as appropriate: allergies, current medications, past family history, past medical history, past social history, past surgical history and problem list.    Review of Systems      Objective:      /96   Pulse 95   Ht 5' 8" (1.727 m)   Wt 118 kg (260 lb)   SpO2 98%   BMI 39.53 kg/m²          Physical Exam      Patient is in no acute distress  Calm and cooperative  He exhibits full range of motion with flexion extension right and left rotation of the lumbar spine with no referred pain   No tenderness over the spinous processes or the paraspinal muscles of lumbar spine, no tenderness to palpation over the PSIS bilaterally  He does not exhibit any motor weakness on exam and lower extremity and no sensory deficits to light touch  He is able to perform a heel and toe walk  No antalgic gait is noted  Negative slump test  There is no tenderness to palpation over the lumbar spinous processes or paraspinal muscle.

## 2023-08-15 DIAGNOSIS — M53.3 SACROILIAC JOINT DYSFUNCTION OF LEFT SIDE: ICD-10-CM

## 2023-08-15 DIAGNOSIS — M51.36 DEGENERATIVE DISC DISEASE, LUMBAR: ICD-10-CM

## 2023-08-15 RX ORDER — NAPROXEN 500 MG/1
500 TABLET ORAL 2 TIMES DAILY WITH MEALS
Qty: 60 TABLET | Refills: 0 | Status: SHIPPED | OUTPATIENT
Start: 2023-08-15

## 2023-08-18 ENCOUNTER — OFFICE VISIT (OUTPATIENT)
Dept: INTERNAL MEDICINE CLINIC | Facility: CLINIC | Age: 58
End: 2023-08-18
Payer: COMMERCIAL

## 2023-08-18 VITALS
BODY MASS INDEX: 37.13 KG/M2 | DIASTOLIC BLOOD PRESSURE: 84 MMHG | SYSTOLIC BLOOD PRESSURE: 120 MMHG | HEART RATE: 74 BPM | HEIGHT: 68 IN | OXYGEN SATURATION: 98 % | RESPIRATION RATE: 14 BRPM | WEIGHT: 245 LBS

## 2023-08-18 DIAGNOSIS — R73.03 PREDIABETES: ICD-10-CM

## 2023-08-18 DIAGNOSIS — E78.2 MIXED HYPERLIPIDEMIA: ICD-10-CM

## 2023-08-18 DIAGNOSIS — I10 BENIGN HYPERTENSION: Primary | ICD-10-CM

## 2023-08-18 PROCEDURE — 99213 OFFICE O/P EST LOW 20 MIN: CPT | Performed by: PHYSICIAN ASSISTANT

## 2023-08-18 NOTE — PATIENT INSTRUCTIONS
Continue current medications. Lookup instructions on YouTube for your glucometer, if you have difficulty understanding bring your unit and supplies over to the office and we will help you. Otherwise we will follow-up as planned in October, labs to do with that visit. Follow-up sooner as needed.

## 2023-08-18 NOTE — PROGRESS NOTES
Assessment/Plan:   Patient Instructions   Continue current medications. Lookup instructions on YouTube for your glucometer, if you have difficulty understanding bring your unit and supplies over to the office and we will help you. Otherwise we will follow-up as planned in October, labs to do with that visit. Follow-up sooner as needed. Quality Measures:       Return for Next scheduled follow up. Diagnoses and all orders for this visit:    Benign hypertension    Mixed hyperlipidemia    Prediabetes          Subjective:      Patient ID: Lyubov Galvan is a 62 y.o. male. Follow-up    Hypertension: Much better control now that he is taking his medication regularly. Sidra cp, palp, sob, edema, HA, dizziness, diaphoresis, syncope, visual disturbance. Also notes significant weight loss by design. Currently on amlodipine 10 mg daily and losartan 100 mg daily. He will continue the medication. Hyperlipidemia: Has started rosuvastatin, tolerating without side effects. We will have repeat labs at next visit. Prediabetes: Did obtain his glucometer and supplies however having difficulty using it. He is going to look at some YouTube videos and if he does not understand we will bring his equipment here and we will help him. ALLERGIES:  No Known Allergies    CURRENT MEDICATIONS:    Current Outpatient Medications:   •  albuterol (PROVENTIL HFA,VENTOLIN HFA) 90 mcg/act inhaler, Inhale 2 puffs every 6 (six) hours as needed for wheezing, Disp: 6.7 g, Rfl: 3  •  amLODIPine (NORVASC) 10 mg tablet, Take 1 tablet (10 mg total) by mouth daily, Disp: 90 tablet, Rfl: 0  •  Blood Glucose Monitoring Suppl (OneTouch Verio) w/Device KIT, Use daily DX: R73.03  Test blood sugars once daily. , Disp: 1 kit, Rfl: 1  •  cyclobenzaprine (FLEXERIL) 5 mg tablet, TAKE 1 TABLET BY MOUTH THREE TIMES A DAY AS NEEDED FOR MUSCLE SPASMS, Disp: 90 tablet, Rfl: 0  •  ergocalciferol (VITAMIN D2) 50,000 units, TAKE 1 CAPSULE BY MOUTH ONE TIME PER WEEK, Disp: 12 capsule, Rfl: 2  •  glucose blood (OneTouch Verio) test strip, Use as instructed, Disp: 100 each, Rfl: 3  •  losartan (COZAAR) 100 MG tablet, Take 1 tablet (100 mg total) by mouth daily, Disp: 90 tablet, Rfl: 0  •  metFORMIN (GLUCOPHAGE-XR) 500 mg 24 hr tablet, TAKE 1 TABLET BY MOUTH EVERY DAY WITH DINNER, Disp: 90 tablet, Rfl: 2  •  naproxen (NAPROSYN) 500 mg tablet, TAKE 1 TABLET BY MOUTH TWICE A DAY WITH FOOD (Patient taking differently: Take 500 mg by mouth as needed), Disp: 60 tablet, Rfl: 0  •  rosuvastatin (CRESTOR) 5 mg tablet, TAKE 1 TABLET (5 MG TOTAL) BY MOUTH DAILY. , Disp: 90 tablet, Rfl: 1  •  tadalafil (CIALIS) 10 MG tablet, Take 1 tablet (10 mg total) by mouth daily as needed for erectile dysfunction, Disp: 10 tablet, Rfl: 2    ACTIVE PROBLEM LIST:  Patient Active Problem List   Diagnosis   • Benign hypertension   • LVH (left ventricular hypertrophy)   • Major depressive disorder, single episode, moderate (HCC)   • Obesity   • Vitamin D deficiency   • Acute pain of right knee   • Sleeping difficulty   • Gouty arthritis of right great toe   • Left foot pain   • Gout   • Prediabetes   • Mixed hyperlipidemia   • Depression, recurrent (HCC)   • Tear of lateral meniscus of right knee   • Internal derangement of right knee   • Costochondritis   • Elevated rheumatoid factor   • Erectile dysfunction       PAST MEDICAL HISTORY:  Past Medical History:   Diagnosis Date   • Dyspnea on exertion     last assessed: 3/11/2016   • Hypertension    • Major depressive disorder, single episode, moderate ([address]) 12/19/2016       PAST SURGICAL HISTORY:  Past Surgical History:   Procedure Laterality Date   • FIBULA FRACTURE SURGERY     • FRACTURE SURGERY  3/9/2007   • HERNIA REPAIR     • TONSILLECTOMY         FAMILY HISTORY:  Family History   Problem Relation Age of Onset   • Leukemia Mother         acute myeloid   • Hypertension Mother    • Multiple myeloma Mother    • Diabetes Father mellitus   • Hypertension Father    • Pneumonia Father         NSIP (nonspecific interstital pneumonia)   • Cancer Father        SOCIAL HISTORY:  Social History     Socioeconomic History   • Marital status:      Spouse name: Not on file   • Number of children: 0   • Years of education: Not on file   • Highest education level: Not on file   Occupational History   • Occupation:  for [employer]     Comment: full-time employment   Tobacco Use   • Smoking status: Never   • Smokeless tobacco: Never   Vaping Use   • Vaping Use: Never used   Substance and Sexual Activity   • Alcohol use: Yes     Alcohol/week: 6.0 standard drinks of alcohol     Types: 3 Cans of beer, 3 Shots of liquor per week     Comment: no alchol use (as per allscripts)   • Drug use: Not Currently     Types: Marijuana   • Sexual activity: Yes     Partners: Female   Other Topics Concern   • Not on file   Social History Narrative    Brushes teeth twice a day    Dental care, regularly    Exercise: walking         Social Determinants of Health     Financial Resource Strain: Not on file   Food Insecurity: Not on file   Transportation Needs: Not on file   Physical Activity: Not on file   Stress: Not on file   Social Connections: Not on file   Intimate Partner Violence: Not on file   Housing Stability: Not on file       Review of Systems   Constitutional: Negative for activity change, chills, fatigue and fever. HENT: Negative for congestion. Eyes: Negative for discharge. Respiratory: Negative for cough, chest tightness and shortness of breath. Cardiovascular: Negative for chest pain, palpitations and leg swelling. Gastrointestinal: Negative for abdominal pain. Genitourinary: Negative for difficulty urinating. Musculoskeletal: Negative for arthralgias and myalgias. Skin: Negative for rash. Allergic/Immunologic: Negative for immunocompromised state.    Neurological: Negative for dizziness, syncope, weakness, light-headedness and headaches. Hematological: Negative for adenopathy. Does not bruise/bleed easily. Psychiatric/Behavioral: Negative for dysphoric mood. The patient is not nervous/anxious. Objective:  Vitals:    08/18/23 0655 08/18/23 0714   BP: 122/90 120/84   BP Location: Left arm Left arm   Patient Position: Sitting Sitting   Pulse: 74    Resp: 14    SpO2: 98%    Weight: 111 kg (245 lb)    Height: 5' 8" (1.727 m)      Body mass index is 37.25 kg/m². Physical Exam  Vitals and nursing note reviewed. Constitutional:       General: He is not in acute distress. Appearance: He is well-developed. HENT:      Head: Normocephalic and atraumatic. Eyes:      Extraocular Movements: Extraocular movements intact. Pupils: Pupils are equal, round, and reactive to light. Neck:      Thyroid: No thyromegaly. Vascular: No carotid bruit or JVD. Cardiovascular:      Rate and Rhythm: Normal rate and regular rhythm. Heart sounds: Normal heart sounds. Pulmonary:      Effort: Pulmonary effort is normal. No respiratory distress. Breath sounds: Normal breath sounds. Musculoskeletal:      Cervical back: Neck supple. Right lower leg: No edema. Left lower leg: No edema. Lymphadenopathy:      Cervical: No cervical adenopathy. Skin:     General: Skin is warm and dry. Findings: No rash. Neurological:      General: No focal deficit present. Mental Status: He is alert and oriented to person, place, and time. Mental status is at baseline. Psychiatric:         Mood and Affect: Mood normal.         Behavior: Behavior normal.           RESULTS:  Hemoglobin A1C   Date/Time Value Ref Range Status   06/09/2023 07:24 AM 6.3 (H) Normal 3.8-5.6%; PreDiabetic 5.7-6.4%;  Diabetic >=6.5%; Glycemic control for adults with diabetes <7.0% % Final     Cholesterol   Date/Time Value Ref Range Status   06/09/2023 07:24  (H) See Comment mg/dL Final     Comment:     Cholesterol:         Pediatric <18 Years        Desirable          <170 mg/dL      Borderline High    170-199 mg/dL      High               >=200 mg/dL        Adult >=18 Years            Desirable        <200 mg/dL      Borderline High  200-239 mg/dL      High             >239 mg/dL       Triglycerides   Date/Time Value Ref Range Status   06/09/2023 07:24  See Comment mg/dL Final     Comment:     Triglyceride:     0-9Y            <75mg/dL     10Y-17Y         <90 mg/dL       >=18Y     Normal          <150 mg/dL     Borderline High 150-199 mg/dL     High            200-499 mg/dL        Very High       >499 mg/dL    Specimen collection should occur prior to N-Acetylcysteine or Metamizole administration due to the potential for falsely depressed results. 12/29/2017 09:39  (H) <150 mg/dL Final     HDL   Date/Time Value Ref Range Status   12/29/2017 09:39 AM 72 >40 mg/dL Final     HDL, Direct   Date/Time Value Ref Range Status   06/09/2023 07:24 AM 57 >=40 mg/dL Final     Comment:     Specimen collection should occur prior to Metamizole administration due to the potential for falsley depressed results. LDL Calculated   Date/Time Value Ref Range Status   06/09/2023 07:24  (H) 0 - 100 mg/dL Final     Comment:     LDL Cholesterol:     Optimal           <100 mg/dl     Near Optimal      100-129 mg/dl     Above Optimal       Borderline High 130-159 mg/dl       High            160-189 mg/dl       Very High       >189 mg/dl         This screening LDL is a calculated result. It does not have the accuracy of the Direct Measured LDL in the monitoring of patients with hyperlipidemia and/or statin therapy. Direct Measure LDL ([id number]) must be ordered separately in these patients.      Hemoglobin   Date/Time Value Ref Range Status   06/09/2023 07:24 AM 14.5 12.0 - 17.0 g/dL Final   12/29/2017 09:39 AM 15.0 13.2 - 17.1 g/dL Final     Hematocrit   Date/Time Value Ref Range Status   06/09/2023 07:24 AM 44.9 36.5 - 49.3 % Final   12/29/2017 09:39 AM 43.9 38.5 - 50.0 % Final     Platelets   Date/Time Value Ref Range Status   06/09/2023 07:24  149 - 390 Thousands/uL Final   12/29/2017 09:39  140 - 400 Thousand/uL Final     PSA   Date/Time Value Ref Range Status   09/26/2022 09:03 AM 1.5 0.0 - 4.0 ng/mL Final     Comment:     American Urological Association Guidelines define biochemical recurrence of prostate cancer as a detectable or rising PSA value post-radical prostatectomy that is greater than or equal to 0.2 ng/mL with a second confirmatory level of greater than or equal to 0.2 ng/mL. TSH 3RD GENERATON   Date/Time Value Ref Range Status   04/04/2017 09:52 AM 1.55 0.40 - 4.50 mIU/L Final     Comment:       Performed at: [address], [facility]  [address] [place], [place] [address]       Free T4   Date/Time Value Ref Range Status   04/04/2017 09:52 AM 1.2 0.8 - 1.8 ng/dL Final     Comment:     Performed at: [address]  [address] [place], [place] [address]       Sodium   Date/Time Value Ref Range Status   06/09/2023 07:24  135 - 147 mmol/L Final     BUN   Date/Time Value Ref Range Status   06/09/2023 07:24 AM 15 5 - 25 mg/dL Final   12/29/2017 09:39 AM 10 7 - 25 mg/dL Final     Creatinine   Date/Time Value Ref Range Status   06/09/2023 07:24 AM 1.19 0.60 - 1.30 mg/dL Final     Comment:     Standardized to IDMS reference method   12/29/2017 09:39 AM 1.00 0.70 - 1.33 mg/dL Final     Comment:     Result Comment: For patients >52years of age, the reference limit  for Creatinine is approximately 13% higher for people  identified as -American. In chart    This note was created with voice recognition software. Phonic, grammatical and spelling errors may be present within the note as a result.

## 2023-09-17 DIAGNOSIS — M51.36 DEGENERATIVE DISC DISEASE, LUMBAR: ICD-10-CM

## 2023-09-17 DIAGNOSIS — M53.3 SACROILIAC JOINT DYSFUNCTION OF LEFT SIDE: ICD-10-CM

## 2023-09-18 RX ORDER — NAPROXEN 500 MG/1
500 TABLET ORAL 2 TIMES DAILY WITH MEALS
Qty: 60 TABLET | Refills: 0 | Status: SHIPPED | OUTPATIENT
Start: 2023-09-18

## 2023-10-21 DIAGNOSIS — I10 BENIGN HYPERTENSION: ICD-10-CM

## 2023-10-21 DIAGNOSIS — I10 ESSENTIAL HYPERTENSION: ICD-10-CM

## 2023-10-23 RX ORDER — AMLODIPINE BESYLATE 10 MG/1
10 TABLET ORAL DAILY
Qty: 90 TABLET | Refills: 0 | Status: SHIPPED | OUTPATIENT
Start: 2023-10-23

## 2023-10-23 RX ORDER — LOSARTAN POTASSIUM 100 MG/1
100 TABLET ORAL DAILY
Qty: 90 TABLET | Refills: 0 | Status: SHIPPED | OUTPATIENT
Start: 2023-10-23

## 2023-11-03 ENCOUNTER — APPOINTMENT (OUTPATIENT)
Dept: LAB | Facility: CLINIC | Age: 58
End: 2023-11-03
Payer: COMMERCIAL

## 2023-11-03 DIAGNOSIS — E78.2 MIXED HYPERLIPIDEMIA: ICD-10-CM

## 2023-11-03 DIAGNOSIS — Z12.5 SCREENING FOR PROSTATE CANCER: ICD-10-CM

## 2023-11-03 DIAGNOSIS — M25.50 MULTIPLE JOINT PAIN: ICD-10-CM

## 2023-11-03 DIAGNOSIS — I10 BENIGN HYPERTENSION: ICD-10-CM

## 2023-11-03 DIAGNOSIS — R73.03 PREDIABETES: ICD-10-CM

## 2023-11-03 LAB
ALBUMIN SERPL BCP-MCNC: 4.1 G/DL (ref 3.5–5)
ALP SERPL-CCNC: 83 U/L (ref 34–104)
ALT SERPL W P-5'-P-CCNC: 16 U/L (ref 7–52)
ANA SER QL IA: NEGATIVE
ANION GAP SERPL CALCULATED.3IONS-SCNC: 10 MMOL/L
AST SERPL W P-5'-P-CCNC: 24 U/L (ref 13–39)
B BURGDOR IGG+IGM SER QL IA: NEGATIVE
BASOPHILS # BLD AUTO: 0.04 THOUSANDS/ÂΜL (ref 0–0.1)
BASOPHILS NFR BLD AUTO: 1 % (ref 0–1)
BILIRUB SERPL-MCNC: 0.39 MG/DL (ref 0.2–1)
BUN SERPL-MCNC: 20 MG/DL (ref 5–25)
CALCIUM SERPL-MCNC: 9.3 MG/DL (ref 8.4–10.2)
CHLORIDE SERPL-SCNC: 104 MMOL/L (ref 96–108)
CHOLEST SERPL-MCNC: 222 MG/DL
CO2 SERPL-SCNC: 29 MMOL/L (ref 21–32)
CREAT SERPL-MCNC: 1.03 MG/DL (ref 0.6–1.3)
CRP SERPL QL: 5.5 MG/L
EOSINOPHIL # BLD AUTO: 0.36 THOUSAND/ÂΜL (ref 0–0.61)
EOSINOPHIL NFR BLD AUTO: 7 % (ref 0–6)
ERYTHROCYTE [DISTWIDTH] IN BLOOD BY AUTOMATED COUNT: 14.4 % (ref 11.6–15.1)
ERYTHROCYTE [SEDIMENTATION RATE] IN BLOOD: 26 MM/HOUR (ref 0–19)
EST. AVERAGE GLUCOSE BLD GHB EST-MCNC: 128 MG/DL
GFR SERPL CREATININE-BSD FRML MDRD: 79 ML/MIN/1.73SQ M
GLUCOSE P FAST SERPL-MCNC: 91 MG/DL (ref 65–99)
HBA1C MFR BLD: 6.1 %
HCT VFR BLD AUTO: 42.7 % (ref 36.5–49.3)
HDLC SERPL-MCNC: 76 MG/DL
HGB BLD-MCNC: 14.2 G/DL (ref 12–17)
IMM GRANULOCYTES # BLD AUTO: 0.04 THOUSAND/UL (ref 0–0.2)
IMM GRANULOCYTES NFR BLD AUTO: 1 % (ref 0–2)
LDLC SERPL CALC-MCNC: 117 MG/DL (ref 0–100)
LYMPHOCYTES # BLD AUTO: 0.92 THOUSANDS/ÂΜL (ref 0.6–4.47)
LYMPHOCYTES NFR BLD AUTO: 19 % (ref 14–44)
MCH RBC QN AUTO: 30.7 PG (ref 26.8–34.3)
MCHC RBC AUTO-ENTMCNC: 33.3 G/DL (ref 31.4–37.4)
MCV RBC AUTO: 92 FL (ref 82–98)
MONOCYTES # BLD AUTO: 0.52 THOUSAND/ÂΜL (ref 0.17–1.22)
MONOCYTES NFR BLD AUTO: 11 % (ref 4–12)
NEUTROPHILS # BLD AUTO: 3.01 THOUSANDS/ÂΜL (ref 1.85–7.62)
NEUTS SEG NFR BLD AUTO: 61 % (ref 43–75)
NONHDLC SERPL-MCNC: 146 MG/DL
NRBC BLD AUTO-RTO: 0 /100 WBCS
PLATELET # BLD AUTO: 340 THOUSANDS/UL (ref 149–390)
PMV BLD AUTO: 10.4 FL (ref 8.9–12.7)
POTASSIUM SERPL-SCNC: 3.9 MMOL/L (ref 3.5–5.3)
PROT SERPL-MCNC: 7.3 G/DL (ref 6.4–8.4)
PSA SERPL-MCNC: 2.38 NG/ML (ref 0–4)
RBC # BLD AUTO: 4.63 MILLION/UL (ref 3.88–5.62)
SODIUM SERPL-SCNC: 143 MMOL/L (ref 135–147)
TRIGL SERPL-MCNC: 144 MG/DL
WBC # BLD AUTO: 4.89 THOUSAND/UL (ref 4.31–10.16)

## 2023-11-03 PROCEDURE — 86038 ANTINUCLEAR ANTIBODIES: CPT

## 2023-11-03 PROCEDURE — 86140 C-REACTIVE PROTEIN: CPT

## 2023-11-03 PROCEDURE — 85652 RBC SED RATE AUTOMATED: CPT

## 2023-11-03 PROCEDURE — 86200 CCP ANTIBODY: CPT

## 2023-11-03 PROCEDURE — 80061 LIPID PANEL: CPT

## 2023-11-03 PROCEDURE — 85025 COMPLETE CBC W/AUTO DIFF WBC: CPT

## 2023-11-03 PROCEDURE — G0103 PSA SCREENING: HCPCS

## 2023-11-03 PROCEDURE — 86430 RHEUMATOID FACTOR TEST QUAL: CPT

## 2023-11-03 PROCEDURE — 86618 LYME DISEASE ANTIBODY: CPT

## 2023-11-03 PROCEDURE — 36415 COLL VENOUS BLD VENIPUNCTURE: CPT

## 2023-11-03 PROCEDURE — 80053 COMPREHEN METABOLIC PANEL: CPT

## 2023-11-03 PROCEDURE — 83036 HEMOGLOBIN GLYCOSYLATED A1C: CPT

## 2023-11-04 LAB — RHEUMATOID FACT SER QL LA: NEGATIVE

## 2023-11-08 LAB — CCP AB SER IA-ACNC: 0.9

## 2023-11-15 ENCOUNTER — OFFICE VISIT (OUTPATIENT)
Dept: INTERNAL MEDICINE CLINIC | Facility: CLINIC | Age: 58
End: 2023-11-15
Payer: COMMERCIAL

## 2023-11-15 VITALS
TEMPERATURE: 97.9 F | WEIGHT: 237.2 LBS | RESPIRATION RATE: 16 BRPM | BODY MASS INDEX: 35.95 KG/M2 | DIASTOLIC BLOOD PRESSURE: 88 MMHG | HEART RATE: 75 BPM | OXYGEN SATURATION: 97 % | HEIGHT: 68 IN | SYSTOLIC BLOOD PRESSURE: 130 MMHG

## 2023-11-15 DIAGNOSIS — E55.9 VITAMIN D DEFICIENCY: ICD-10-CM

## 2023-11-15 DIAGNOSIS — I10 BENIGN HYPERTENSION: Primary | ICD-10-CM

## 2023-11-15 DIAGNOSIS — R73.03 PREDIABETES: ICD-10-CM

## 2023-11-15 DIAGNOSIS — E78.2 MIXED HYPERLIPIDEMIA: ICD-10-CM

## 2023-11-15 PROCEDURE — 99214 OFFICE O/P EST MOD 30 MIN: CPT | Performed by: PHYSICIAN ASSISTANT

## 2023-11-15 NOTE — PATIENT INSTRUCTIONS
Continue current medications. Continue what you are doing for your gradual weight reduction as you can see it is helping with your overall health and labs. We will schedule follow-up in 6 months with repeat labs for that visit, follow-up sooner as needed.

## 2023-11-15 NOTE — PROGRESS NOTES
Assessment/Plan:   Patient Instructions   Continue current medications. Continue what you are doing for your gradual weight reduction as you can see it is helping with your overall health and labs. We will schedule follow-up in 6 months with repeat labs for that visit, follow-up sooner as needed. Quality Measures:       No follow-ups on file. Diagnoses and all orders for this visit:    Benign hypertension    Vitamin D deficiency    Prediabetes  -     Hemoglobin A1C; Future  -     Comprehensive metabolic panel; Future    Mixed hyperlipidemia  -     Lipid panel; Future          Subjective:      Patient ID: Alba Higginbotham is a 62 y.o. male. Follow-up, labs reviewed with patient    Patient reporting overall feeling well. He has lost 8 pounds since his last visit. He has done so by portion control, and intermittent fasting. Prediabetes: A1c 6.1 with normal fasting blood sugar. Denies polyuria, polyphagia, polydipsia. Normal renal functions. Hydrates well during the day. Tries to avoid NSAIDs if possible. Currently on metformin  mg 1 daily. He will continue the medication. Hypertension: Acceptable blood pressure. Anticipate further improvement with weight reduction. Currently on amlodipine 10 mg daily, losartan 100 g daily. He will continue these medications. Hyperlipidemia: Currently on rosuvastatin 5 mg daily. Lipids improved he will continue the medication. Also anticipate improvement with weight reduction. Vitamin D deficiency: On supplemental vitamin D. Previous D level is in acceptable range. Asymptomatic.         ALLERGIES:  No Known Allergies    CURRENT MEDICATIONS:    Current Outpatient Medications:   •  albuterol (PROVENTIL HFA,VENTOLIN HFA) 90 mcg/act inhaler, Inhale 2 puffs every 6 (six) hours as needed for wheezing, Disp: 6.7 g, Rfl: 3  •  amLODIPine (NORVASC) 10 mg tablet, TAKE 1 TABLET BY MOUTH EVERY DAY, Disp: 90 tablet, Rfl: 0  •  Blood Glucose Monitoring Suppl (OneTouch Verio) w/Device KIT, Use daily DX: R73.03  Test blood sugars once daily. , Disp: 1 kit, Rfl: 1  •  cyclobenzaprine (FLEXERIL) 5 mg tablet, TAKE 1 TABLET BY MOUTH THREE TIMES A DAY AS NEEDED FOR MUSCLE SPASMS, Disp: 90 tablet, Rfl: 0  •  ergocalciferol (VITAMIN D2) 50,000 units, TAKE 1 CAPSULE BY MOUTH ONE TIME PER WEEK, Disp: 12 capsule, Rfl: 2  •  glucose blood (OneTouch Verio) test strip, Use as instructed, Disp: 100 each, Rfl: 3  •  losartan (COZAAR) 100 MG tablet, TAKE 1 TABLET BY MOUTH EVERY DAY, Disp: 90 tablet, Rfl: 0  •  metFORMIN (GLUCOPHAGE-XR) 500 mg 24 hr tablet, TAKE 1 TABLET BY MOUTH EVERY DAY WITH DINNER, Disp: 90 tablet, Rfl: 2  •  naproxen (NAPROSYN) 500 mg tablet, TAKE 1 TABLET BY MOUTH TWICE A DAY WITH FOOD, Disp: 60 tablet, Rfl: 0  •  rosuvastatin (CRESTOR) 5 mg tablet, TAKE 1 TABLET (5 MG TOTAL) BY MOUTH DAILY. , Disp: 90 tablet, Rfl: 1  •  tadalafil (CIALIS) 10 MG tablet, Take 1 tablet (10 mg total) by mouth daily as needed for erectile dysfunction, Disp: 10 tablet, Rfl: 2    ACTIVE PROBLEM LIST:  Patient Active Problem List   Diagnosis   • Benign hypertension   • LVH (left ventricular hypertrophy)   • Major depressive disorder, single episode, moderate (HCC)   • Obesity   • Vitamin D deficiency   • Acute pain of right knee   • Sleeping difficulty   • Gouty arthritis of right great toe   • Left foot pain   • Gout   • Prediabetes   • Mixed hyperlipidemia   • Depression, recurrent (HCC)   • Tear of lateral meniscus of right knee   • Internal derangement of right knee   • Costochondritis   • Elevated rheumatoid factor   • Erectile dysfunction       PAST MEDICAL HISTORY:  Past Medical History:   Diagnosis Date   • Dyspnea on exertion     last assessed: 3/11/2016   • Hypertension    • Major depressive disorder, single episode, moderate ([address]) 12/19/2016       PAST SURGICAL HISTORY:  Past Surgical History:   Procedure Laterality Date   • FIBULA FRACTURE SURGERY     • FRACTURE SURGERY 3/9/2007   • HERNIA REPAIR     • TONSILLECTOMY         FAMILY HISTORY:  Family History   Problem Relation Age of Onset   • Leukemia Mother         acute myeloid   • Hypertension Mother    • Multiple myeloma Mother    • Diabetes Father         mellitus   • Hypertension Father    • Pneumonia Father         NSIP (nonspecific interstital pneumonia)   • Cancer Father        SOCIAL HISTORY:  Social History     Socioeconomic History   • Marital status:      Spouse name: Not on file   • Number of children: 0   • Years of education: Not on file   • Highest education level: Not on file   Occupational History   • Occupation:  for [employer]     Comment: full-time employment   Tobacco Use   • Smoking status: Never   • Smokeless tobacco: Never   Vaping Use   • Vaping Use: Never used   Substance and Sexual Activity   • Alcohol use: Yes     Alcohol/week: 6.0 standard drinks of alcohol     Types: 3 Cans of beer, 3 Shots of liquor per week     Comment: no alchol use (as per allscripts)   • Drug use: Not Currently     Types: Marijuana   • Sexual activity: Not Currently     Partners: Female   Other Topics Concern   • Not on file   Social History Narrative    Brushes teeth twice a day    Dental care, regularly    Exercise: walking         Social Determinants of Health     Financial Resource Strain: Not on file   Food Insecurity: Not on file   Transportation Needs: Not on file   Physical Activity: Not on file   Stress: Not on file   Social Connections: Not on file   Intimate Partner Violence: Not on file   Housing Stability: Not on file       Review of Systems   Constitutional:  Negative for activity change, chills, fatigue and fever. HENT:  Negative for congestion. Eyes:  Negative for discharge and visual disturbance. Respiratory:  Negative for cough, chest tightness and shortness of breath. Cardiovascular:  Negative for chest pain, palpitations and leg swelling.    Gastrointestinal:  Negative for abdominal pain, blood in stool, constipation, diarrhea, nausea and vomiting. Endocrine: Negative for polydipsia, polyphagia and polyuria. Genitourinary:  Negative for difficulty urinating. Musculoskeletal:  Negative for arthralgias and myalgias. Skin:  Negative for rash. Allergic/Immunologic: Negative for immunocompromised state. Neurological:  Negative for dizziness, syncope, weakness, light-headedness and headaches. Hematological:  Negative for adenopathy. Does not bruise/bleed easily. Psychiatric/Behavioral:  Negative for dysphoric mood, sleep disturbance and suicidal ideas. The patient is not nervous/anxious. Objective:  Vitals:    11/15/23 1427 11/15/23 1456   BP: 150/96 130/88   BP Location: Right arm Left arm   Patient Position: Sitting Sitting   Cuff Size: Large    Pulse: 75    Resp: 16    Temp: 97.9 °F (36.6 °C)    TempSrc: Tympanic    SpO2: 97%    Weight: 108 kg (237 lb 3.2 oz)    Height: 5' 8" (1.727 m)      Body mass index is 36.07 kg/m². Physical Exam  Vitals and nursing note reviewed. Constitutional:       General: He is not in acute distress. Appearance: He is well-developed. HENT:      Head: Normocephalic and atraumatic. Eyes:      Extraocular Movements: Extraocular movements intact. Pupils: Pupils are equal, round, and reactive to light. Neck:      Thyroid: No thyromegaly. Vascular: No carotid bruit or JVD. Cardiovascular:      Rate and Rhythm: Normal rate and regular rhythm. Heart sounds: Normal heart sounds. Pulmonary:      Effort: Pulmonary effort is normal. No respiratory distress. Breath sounds: Normal breath sounds. Musculoskeletal:      Cervical back: Neck supple. Right lower leg: No edema. Left lower leg: No edema. Lymphadenopathy:      Cervical: No cervical adenopathy. Skin:     General: Skin is warm and dry. Findings: No rash. Neurological:      General: No focal deficit present.       Mental Status: He is alert and oriented to person, place, and time. Mental status is at baseline. Psychiatric:         Mood and Affect: Mood normal.         Behavior: Behavior normal.           RESULTS:  Hemoglobin A1C   Date/Time Value Ref Range Status   11/03/2023 08:25 AM 6.1 (H) Normal 4.0-5.6%; PreDiabetic 5.7-6.4%; Diabetic >=6.5%; Glycemic control for adults with diabetes <7.0% % Final     Cholesterol   Date/Time Value Ref Range Status   11/03/2023 08:25  (H) See Comment mg/dL Final     Comment:     Cholesterol:         Pediatric <18 Years        Desirable          <170 mg/dL      Borderline High    170-199 mg/dL      High               >=200 mg/dL        Adult >=18 Years            Desirable        <200 mg/dL      Borderline High  200-239 mg/dL      High             >239 mg/dL       Triglycerides   Date/Time Value Ref Range Status   11/03/2023 08:25  See Comment mg/dL Final     Comment:     Triglyceride:     0-9Y            <75mg/dL     10Y-17Y         <90 mg/dL       >=18Y     Normal          <150 mg/dL     Borderline High 150-199 mg/dL     High            200-499 mg/dL        Very High       >499 mg/dL    Specimen collection should occur prior to Metamizole administration due to the potential for falsely depressed results. 12/29/2017 09:39  (H) <150 mg/dL Final     HDL   Date/Time Value Ref Range Status   12/29/2017 09:39 AM 72 >40 mg/dL Final     HDL, Direct   Date/Time Value Ref Range Status   11/03/2023 08:25 AM 76 >=40 mg/dL Final     LDL Calculated   Date/Time Value Ref Range Status   11/03/2023 08:25  (H) 0 - 100 mg/dL Final     Comment:     LDL Cholesterol:     Optimal           <100 mg/dl     Near Optimal      100-129 mg/dl     Above Optimal       Borderline High 130-159 mg/dl       High            160-189 mg/dl       Very High       >189 mg/dl         This screening LDL is a calculated result.    It does not have the accuracy of the Direct Measured LDL in the monitoring of patients with hyperlipidemia and/or statin therapy. Direct Measure LDL (TUG049) must be ordered separately in these patients. Hemoglobin   Date/Time Value Ref Range Status   11/03/2023 08:25 AM 14.2 12.0 - 17.0 g/dL Final   12/29/2017 09:39 AM 15.0 13.2 - 17.1 g/dL Final     Hematocrit   Date/Time Value Ref Range Status   11/03/2023 08:25 AM 42.7 36.5 - 49.3 % Final   12/29/2017 09:39 AM 43.9 38.5 - 50.0 % Final     Platelets   Date/Time Value Ref Range Status   11/03/2023 08:25  149 - 390 Thousands/uL Final   12/29/2017 09:39  140 - 400 Thousand/uL Final     PSA   Date/Time Value Ref Range Status   11/03/2023 08:25 AM 2.38 0.00 - 4.00 ng/mL Final     Comment:     [employer] Access chemiluminescent immunoassay. Confirm baseline values for patients being serially monitored. 09/26/2022 09:03 AM 1.5 0.0 - 4.0 ng/mL Final     Comment:     American Urological Association Guidelines define biochemical recurrence of prostate cancer as a detectable or rising PSA value post-radical prostatectomy that is greater than or equal to 0.2 ng/mL with a second confirmatory level of greater than or equal to 0.2 ng/mL.      TSH 3RD GENERATON   Date/Time Value Ref Range Status   04/04/2017 09:52 AM 1.55 0.40 - 4.50 mIU/L Final     Comment:       Performed at: [address], [address] [address]       Free T4   Date/Time Value Ref Range Status   04/04/2017 09:52 AM 1.2 0.8 - 1.8 ng/dL Final     Comment:     Performed at: [address] MD, [facility] [address], [place] [address]       Sodium   Date/Time Value Ref Range Status   11/03/2023 08:25  135 - 147 mmol/L Final     BUN   Date/Time Value Ref Range Status   11/03/2023 08:25 AM 20 5 - 25 mg/dL Final   12/29/2017 09:39 AM 10 7 - 25 mg/dL Final     Creatinine   Date/Time Value Ref Range Status   11/03/2023 08:25 AM 1.03 0.60 - 1.30 mg/dL Final     Comment: Standardized to IDMS reference method   12/29/2017 09:39 AM 1.00 0.70 - 1.33 mg/dL Final     Comment:     Result Comment: For patients >52years of age, the reference limit  for Creatinine is approximately 13% higher for people  identified as -American. In chart    This note was created with voice recognition software. Phonic, grammatical and spelling errors may be present within the note as a result.